# Patient Record
Sex: FEMALE | Race: WHITE | NOT HISPANIC OR LATINO | Employment: OTHER | URBAN - METROPOLITAN AREA
[De-identification: names, ages, dates, MRNs, and addresses within clinical notes are randomized per-mention and may not be internally consistent; named-entity substitution may affect disease eponyms.]

---

## 2017-01-17 ENCOUNTER — TELEPHONE (OUTPATIENT)
Dept: PEDIATRICS | Facility: CLINIC | Age: 67
End: 2017-01-17

## 2017-01-17 ENCOUNTER — OFFICE VISIT (OUTPATIENT)
Dept: PEDIATRICS | Facility: CLINIC | Age: 67
End: 2017-01-17
Payer: COMMERCIAL

## 2017-01-17 VITALS
DIASTOLIC BLOOD PRESSURE: 63 MMHG | HEART RATE: 78 BPM | WEIGHT: 119.4 LBS | OXYGEN SATURATION: 100 % | TEMPERATURE: 99.2 F | HEIGHT: 63 IN | BODY MASS INDEX: 21.16 KG/M2 | SYSTOLIC BLOOD PRESSURE: 101 MMHG

## 2017-01-17 DIAGNOSIS — Z23 NEED FOR PROPHYLACTIC VACCINATION AGAINST STREPTOCOCCUS PNEUMONIAE (PNEUMOCOCCUS): ICD-10-CM

## 2017-01-17 DIAGNOSIS — Z12.31 ENCOUNTER FOR SCREENING MAMMOGRAM FOR BREAST CANCER: ICD-10-CM

## 2017-01-17 DIAGNOSIS — F43.21 SITUATIONAL DEPRESSION: ICD-10-CM

## 2017-01-17 DIAGNOSIS — Z13.6 CARDIOVASCULAR SCREENING; LDL GOAL LESS THAN 160: ICD-10-CM

## 2017-01-17 DIAGNOSIS — Z13.1 SCREENING FOR DIABETES MELLITUS: ICD-10-CM

## 2017-01-17 DIAGNOSIS — I83.93 VARICOSE VEINS OF BOTH LOWER EXTREMITIES: ICD-10-CM

## 2017-01-17 DIAGNOSIS — F43.21 SITUATIONAL DEPRESSION: Primary | ICD-10-CM

## 2017-01-17 DIAGNOSIS — Z00.00 ENCOUNTER FOR ROUTINE ADULT HEALTH EXAMINATION WITHOUT ABNORMAL FINDINGS: Primary | ICD-10-CM

## 2017-01-17 PROCEDURE — G0439 PPPS, SUBSEQ VISIT: HCPCS | Performed by: NURSE PRACTITIONER

## 2017-01-17 PROCEDURE — 90670 PCV13 VACCINE IM: CPT | Performed by: NURSE PRACTITIONER

## 2017-01-17 PROCEDURE — G0009 ADMIN PNEUMOCOCCAL VACCINE: HCPCS | Performed by: NURSE PRACTITIONER

## 2017-01-17 RX ORDER — CITALOPRAM HYDROBROMIDE 10 MG/1
10 TABLET ORAL DAILY
Qty: 90 TABLET | Refills: 1 | Status: SHIPPED | OUTPATIENT
Start: 2017-01-17 | End: 2017-08-23

## 2017-01-17 RX ORDER — CITALOPRAM HYDROBROMIDE 10 MG/1
10 TABLET ORAL DAILY
Qty: 90 TABLET | Refills: 1 | Status: SHIPPED | OUTPATIENT
Start: 2017-01-17 | End: 2017-01-17

## 2017-01-17 NOTE — PROGRESS NOTES
SUBJECTIVE:                                                            Aleksandra Michel is a 66 year old female who presents for Preventive Visit    Are you in the first 12 months of your Medicare Part B coverage?  No    Healthy Habits:    Do you get at least three servings of calcium containing foods daily (dairy, green leafy vegetables, etc.)? yes    Amount of exercise or daily activities, outside of work: 3-4 day(s) per week    Problems taking medications regularly No    Medication side effects: No    Have you had an eye exam in the past two years? yes    Do you see a dentist twice per year? yes    Do you have sleep apnea, excessive snoring or daytime drowsiness?no    COGNITIVE SCREEN  1) Repeat 3 items (Banana, Sunrise, Chair)    2) Clock draw: NORMAL  3) 3 item recall: Recalls 3 objects  Results: 3 items recalled: COGNITIVE IMPAIRMENT LESS LIKELY    Mini-CogTM Copyright S Malcolm. Licensed by the author for use in [facility] BO.LT; reprinted with permission (sarahi@[facility]). All rights reserved.          All Histories reviewed and updated in EPIC as appropriate.  Social History   Substance Use Topics     Smoking status: Former Smoker -- 4 years     Types: Cigarettes     Smokeless tobacco: Never Used     Alcohol Use: Yes      Comment: occasional glass of wine     Today's PHQ-2 Score:   PHQ-2 ( 1999 Pf[employer]) 12/30/2016 6/21/2012   Q1: Little interest or pleasure in doing things 1 0   Q2: Feeling down, depressed or hopeless 1 0   PHQ-2 Score 2 0       Do you feel safe in your environment - Yes    Do you have a Health Care Directive?: Yes: Advance Directive has been received and scanned.    Current providers sharing in care for this patient include:   Patient Care Team:  Stephany Hannon APRN CNP as PCP - General (Family Practice)      Hearing impairment: No    Ability to successfully perform activities of daily living: Yes, no assistance needed     Fall risk:       Home safety:  none identified      The  following health maintenance items are reviewed in Epic and correct as of today:  Health Maintenance   Topic Date Due     HEPATITIS C SCREENING  03/28/1966     FALL RISK ASSESSMENT  03/28/2013     PNEUMOCOCCAL (1 of 2 - PCV13) 03/28/2013     MAMMO SCREEN Q2 YR (SYSTEM ASSIGNED)  05/15/2015     COLONOSCOPY Q3 YR INBASKET MESSAGE  07/18/2016     INFLUENZA VACCINE (SYSTEM ASSIGNED)  09/01/2016     LIPID SCREEN Q5 YR FEMALE (SYSTEM ASSIGNED)  03/27/2018     ADVANCE DIRECTIVE PLANNING Q5 YRS (NO INBASKET)  02/18/2019     TETANUS IMMUNIZATION (SYSTEM ASSIGNED)  03/27/2023     DEXA SCAN SCREENING (SYSTEM ASSIGNED)  Completed         Pneumonia Vaccine:Adults age 65+ who have not received previous Pneumovax (PPSV23) or PCV13 as an adult: Should first be given PCV13 AND then should be given PPSV23 6-12 months after PCV13  Mammogram Screening: Patient over age 50, mutual decision to screen reflected in health maintenance.     ROS:  CONSTITUTIONAL:NEGATIVE for fever, chills, change in weight  INTEGUMENTARY/SKIN: NEGATIVE for worrisome rashes, moles or lesions  EYES: NEGATIVE for vision changes or irritation  ENT: NEGATIVE for ear, mouth and throat problems  RESP:NEGATIVE for significant cough or SOB  BREAST: NEGATIVE for masses, tenderness or discharge  CV: NEGATIVE for chest pain, palpitations or peripheral edema  GI: NEGATIVE for nausea, abdominal pain, heartburn, or change in bowel habits   menopausal female: amenorrhea, no unusual urinary symptoms and no unusual vaginal symptoms  Had Roxanne Kae procedure and has no incontinence now   MUSCULOSKELETAL:NEGATIVE for significant arthralgias or myalgia  NEURO: NEGATIVE for weakness, dizziness or paresthesias  ENDOCRINE: NEGATIVE for temperature intolerance, skin/hair changes  HEME/ALLERGY/IMMUNE: NEGATIVE for bleeding problems  PSYCHIATRIC: NEGATIVE for changes in mood or affect and POSITIVE for situational depression        BP Readings from Last 3 Encounters:   01/17/17 101/63    12/30/16 107/73   03/27/13 98/62    Wt Readings from Last 3 Encounters:   01/17/17 119 lb 6.4 oz (54.159 kg)   12/30/16 122 lb 6.4 oz (55.52 kg)   03/27/13 139 lb (63.05 kg)                  Patient Active Problem List   Diagnosis     Colon polyp     Seasonal allergic rhinitis     Osteoporosis     CARDIOVASCULAR SCREENING; LDL GOAL LESS THAN 160     Advanced directives, counseling/discussion     Past Surgical History   Procedure Laterality Date     Hysterectomy, vaginal  1985     age 36, prolonged menses. ovaries remain       Social History   Substance Use Topics     Smoking status: Former Smoker -- 4 years     Types: Cigarettes     Smokeless tobacco: Never Used     Alcohol Use: Yes      Comment: occasional glass of wine     Family History   Problem Relation Age of Onset     Arthritis Brother      Eye Disorder Mother      macular degeneration         Current Outpatient Prescriptions   Medication Sig Dispense Refill     Multiple Vitamins-Minerals (MULTI COMPLETE PO)        UNABLE TO FIND daily MEDICATION NAME: Osteo supplement for bones. Takes liquid once daily       cetirizine (ZYRTEC) 10 MG tablet Take 10 mg by mouth daily as needed for allergies       vitamin B complex with vitamin C (VITAMIN  B COMPLEX) TABS tablet Take 1 tablet by mouth daily       UNKNOWN TO PATIENT Hormone cream from Gyno-uses twice per week       citalopram (CELEXA) 20 MG tablet Take 1/2 tablet (10 mg) for 1-2 weeks, then increase to 1 tablet orally daily 30 tablet 0     Calcium Carbonate-Vitamin D (CALCIUM + D PO) Take 1 tablet by mouth daily. Takes 3 tablets in morning and 2 tablets in evening       Multiple Vitamin (DAILY MULTIVITAMIN PO) Take 1 tablet by mouth daily.       Multiple Vitamins-Minerals (PRESERVISION/LUTEIN PO) Take 1 tablet by mouth daily.       Allergies   Allergen Reactions     Dust Mites      Mold      Seasonal Allergies      OBJECTIVE:                                                            /63 mmHg  Pulse 78  " Temp(Src) 99.2  F (37.3  C) (Temporal)  Ht 5' 3\" (1.6 m)  Wt 119 lb 6.4 oz (54.159 kg)  BMI 21.16 kg/m2  SpO2 100% Estimated body mass index is 21.16 kg/(m^2) as calculated from the following:    Height as of this encounter: 5' 3\" (1.6 m).    Weight as of this encounter: 119 lb 6.4 oz (54.159 kg).  EXAM:   GENERAL APPEARANCE: healthy, alert and no distress  EYES: Eyes grossly normal to inspection, PERRL and conjunctivae and sclerae normal  HENT: ear canals and TM's normal, nose and mouth without ulcers or lesions, oropharynx clear and oral mucous membranes moist  NECK: no adenopathy, no asymmetry, masses, or scars and thyroid normal to palpation  RESP: lungs clear to auscultation - no rales, rhonchi or wheezes  BREAST: normal without masses, tenderness or nipple discharge and no palpable axillary masses or adenopathy  CV: regular rate and rhythm, normal S1 S2, no S3 or S4, no murmur, click or rub, no peripheral edema and peripheral pulses strong  ABDOMEN: soft, nontender, no hepatosplenomegaly, no masses and bowel sounds normal   (female): normal female external genitalia, normal urethral meatus, vaginal mucosal atrophy noted, normal cervix, adnexae, and uterus without masses or abnormal discharge  MS: no musculoskeletal defects are noted and gait is age appropriate without ataxia  EXTREMITIES: Good pulses. Good range of motion.  No edema. Normal reflexes. Diffuse, blanching varicosities bilateraly.  SKIN: no suspicious lesions or rashes  NEURO: Normal strength and tone, sensory exam grossly normal, mentation intact and speech normal  PSYCH: mentation appears normal and affect normal/bright    ASSESSMENT / PLAN:                                                            Aleksandra was seen today for physical.    Diagnoses and all orders for this visit:    Encounter for routine adult health examination without abnormal findings  -     Lipid panel reflex to direct LDL; Future  -     Basic metabolic panel; " "Future    Situational depression  -     Discontinue: citalopram (CELEXA) 10 MG tablet; Take 1 tablet (10 mg) by mouth daily Take 1/2 tablet (10 mg) for 1-2 weeks, then increase to 1 tablet orally daily    Need for prophylactic vaccination against Streptococcus pneumoniae (pneumococcus)  -     PNEUMOCOCCAL CONJ VACCINE 13 VALENT IM (PREVNAR 13)  -     ADMIN 1st VACCINE    Encounter for screening mammogram for breast cancer  -     MA Screening Digital Bilateral; Future    Varicose veins of both lower extremities  -     VASCULAR SURGERY REFERRAL    CARDIOVASCULAR SCREENING; LDL GOAL LESS THAN 160  -     Lipid panel reflex to direct LDL; Future    Screening for diabetes mellitus  -     Basic metabolic panel; Future    PLAN:    Patient needs to follow up in if no improvement,or sooner if worsening of symptoms or other symptoms develop.  CONSULTATION/REFERRAL to Vascular surgeon   FUTURE LABS:       - Schedule a fasting blood draw   Follow up in 6 months.  Follow up office visit in one year for annual health maintenance exam, sooner PRN.      End of Life Planning:  Patient currently has an advanced directive: Yes.  Practitioner is supportive of decision.    COUNSELING:  Reviewed preventive health counseling, as reflected in patient instructions       Regular exercise       Healthy diet/nutrition       Vaccinated for: Pneumococcal       Osteoporosis Prevention/Bone Health       Colon cancer screening       The ASCVD Risk score ([place] HOLLIE Jr., et al., 2013) failed to calculate for the following reasons:    Cannot find a previous HDL lab    Cannot find a previous total cholesterol lab        Estimated body mass index is 21.16 kg/(m^2) as calculated from the following:    Height as of this encounter: 5' 3\" (1.6 m).    Weight as of this encounter: 119 lb 6.4 oz (54.159 kg).     reports that she has quit smoking. Her smoking use included Cigarettes. She quit after 4 years of use. She has never used smokeless " tobacco.      Appropriate preventive services were discussed with this patient, including applicable screening as appropriate for cardiovascular disease, diabetes, osteopenia/osteoporosis, and glaucoma.  As appropriate for age/gender, discussed screening for colorectal cancer, prostate cancer, breast cancer, and cervical cancer. Checklist reviewing preventive services available has been given to the patient.    Reviewed patients plan of care and provided an AVS. The Intermediate Care Plan ( asthma action plan, low back pain action plan, and migraine action plan) for Aleksandra meets the Care Plan requirement. This Care Plan has been established and reviewed with the Patient.    Counseling Resources:  ATP IV Guidelines  Pooled Cohorts Equation Calculator  Breast Cancer Risk Calculator  FRAX Risk Assessment  ICSI Preventive Guidelines  Dietary Guidelines for Americans, 2010  regrob.com's MyPlate  ASA Prophylaxis  Lung CA Screening    VAR[facility]        Screening Questionnaire for Adult Immunization    Are you sick today?   No   Do you have allergies to medications, food, a vaccine component or latex?   No   Have you ever had a serious reaction after receiving a vaccination?   No   Do you have a long-term health problem with heart disease, lung disease, asthma, kidney disease, metabolic disease (e.g. diabetes), anemia, or other blood disorder?   No   Do you have cancer, leukemia, HIV/AIDS, or any other immune system problem?   No   In the past 3 months, have you taken medications that affect  your immune system, such as prednisone, other steroids, or anticancer drugs; drugs for the treatment of rheumatoid arthritis, Crohn s disease, or psoriasis; or have you had radiation treatments?   No   Have you had a seizure, or a brain or other nervous system problem?   No   During the past year, have you received a transfusion of blood or blood     products, or been given immune (gamma)  globulin or antiviral drug?   No   For women: Are you pregnant or is there a chance you could become        pregnant during the next month?   No   Have you received any vaccinations in the past 4 weeks?   No     Immunization questionnaire answers were all negative.      MNVFC doesn't apply on this patient    Per orders of Dr. Stephany Hannon, injection of PCV 13 given by Maritza Tse. Patient instructed to remain in clinic for 20 minutes afterwards, and to report any adverse reaction to me immediately.       Screening performed by Maritza Tse on 1/17/2017 at 2:30 PM.

## 2017-01-17 NOTE — NURSING NOTE
"Chief Complaint   Patient presents with     Physical       Initial /63 mmHg  Pulse 78  Temp(Src) 99.2  F (37.3  C) (Temporal)  Ht 5' 3\" (1.6 m)  Wt 119 lb 6.4 oz (54.159 kg)  BMI 21.16 kg/m2  SpO2 100% Estimated body mass index is 21.16 kg/(m^2) as calculated from the following:    Height as of this encounter: 5' 3\" (1.6 m).    Weight as of this encounter: 119 lb 6.4 oz (54.159 kg).  BP completed using cuff size: osmany Ochoa CMA      "

## 2017-01-17 NOTE — TELEPHONE ENCOUNTER
[facility] pharmacy calling for clarification on Celexa dosing ordered today 1/17/17.    citalopram (CELEXA) 10 MG tablet 90 tablet 1 1/17/2017  --      Sig: Take 1 tablet (10 mg) by mouth daily Take 1/2 tablet (10 mg) for 1-2 weeks, then increase to 1        Will route to provider to clarify dose.  Pharmacy asking if provider wants 20 mg tablets ordered?      Provider, please review and advise.  Thank you!

## 2017-01-17 NOTE — MR AVS SNAPSHOT
After Visit Summary   1/17/2017    Aleksandra Michel    MRN: [id number]           Patient Information     Date Of Birth          1950        Visit Information        Provider Department      1/17/2017 1:40 PM Stephany Hannon APRN CNP [facility]        Today's Diagnoses     Encounter for routine adult health examination without abnormal findings    -  1     Situational depression         Need for prophylactic vaccination against Streptococcus pneumoniae (pneumococcus)         Encounter for screening mammogram for breast cancer         CARDIOVASCULAR SCREENING; LDL GOAL LESS THAN 160         Screening for diabetes mellitus         Varicose veins of both lower extremities           Care Instructions    It was a pleasure seeing you today at the [facility]. Thank you for allowing us to care for you today. We truly hope we provided you with the excellent service you deserve. Please let us know if there is anything else we can do for you so we can be sure you are leaving completley satisfied with your care experience.       General information about your clinic   Clinic Hours Lab Hours (Appointments are required)   Mon-Thurs: 7:30 AM - 7 PM Mon-Thurs: 7:30 AM - 7 PM   Fri: 7:30 AM - 5 PM Fri: 7:30 AM - 5 PM        After Hours Nurse Advise & Appts:  [facility]visors: [phone]  [place] On Call: to make appointments anytime: [phone] On Call Physician: call [phone] and answering service will page the on call physician.        For urgent appointments, please call [phone] and ask for the triage nurse or your care team clinic nurse.  How to contact my care team:  MyChart: [email or web address]   Phone: [phone]   Fax: [phone]       [facility]:   Phone: [phone]  Hours: 8:00 AM - 6:00 PM  Medication Refills:  Call your pharmacy and they will forward the refill to us. Please allow 3 business days for your  refills to be completed.       Normal or non-critical lab and imaging results will be communicated to you by MyChart, letter or phone within 7 days.  If you do not hear from us within 10 days, please call the clinic. If you have a critical or abnormal lab result, we will notify you by phone as soon as possible.       We now have PWIC (Pediatric Walk in Care)  Monday-Friday from 7:30-4. Simply walk in and be seen for your urgent needs like cough, fever, rash, diarrhea or vomiting, pink eye, UTI. No appointments needed. Ask one of the team for more information      -Your Care Team:    Dr. Ron Butterfield - Internal Medicine/Pediatrics   Dr. Casimiro Flood - Family Medicine  Dr. Barbra Pennington - Pediatrics  Stephany Hannon CNP - Family Practice Nurse Practitioner         PLAN:   1.   Symptomatic therapy suggested: Continue current medications.  2.  Orders Placed This Encounter   Medications     Multiple Vitamins-Minerals (MULTI COMPLETE PO)     Sig:      citalopram (CELEXA) 10 MG tablet     Sig: Take 1 tablet (10 mg) by mouth daily Take 1/2 tablet (10 mg) for 1-2 weeks, then increase to 1 tablet orally daily     Dispense:  90 tablet     Refill:  1     Orders Placed This Encounter   Procedures     MA Screening Digital Bilateral     PNEUMOCOCCAL CONJ VACCINE 13 VALENT IM (PREVNAR 13)     Lipid panel reflex to direct LDL     Basic metabolic panel     VASCULAR SURGERY REFERRAL       3. Patient needs to follow up in if no improvement,or sooner if worsening of symptoms or other symptoms develop.  CONSULTATION/REFERRAL to Vascular surgeon   FUTURE LABS:       - Schedule a fasting blood draw   Follow up in 6 months.  Follow up office visit in one year for annual health maintenance exam, sooner PRN.      Preventive Health Recommendations    Female Ages 65 +    Yearly exam:     See your health care provider every year in order to  o Review health changes.   o Discuss preventive care.    o Review your medicines if your doctor has  prescribed any.      You no longer need a yearly Pap test unless you've had an abnormal Pap test in the past 10 years. If you have vaginal symptoms, such as bleeding or discharge, be sure to talk with your provider about a Pap test.      Every 1 to 2 years, have a mammogram.  If you are over 69, talk with your health care provider about whether or not you want to continue having screening mammograms.      Every 10 years, have a colonoscopy. Or, have a yearly FIT test (stool test). These exams will check for colon cancer.       Have a cholesterol test every 5 years, or more often if your doctor advises it.       Have a diabetes test (fasting glucose) every three years. If you are at risk for diabetes, you should have this test more often.       At age 65, have a bone density scan (DEXA) to check for osteoporosis (brittle bone disease).    Shots:    Get a flu shot each year.    Get a tetanus shot every 10 years.    Talk to your doctor about your pneumonia vaccines. There are now two you should receive - Pneumovax (PPSV 23) and Prevnar (PCV 13).    Talk to your doctor about the shingles vaccine.    Talk to your doctor about the hepatitis B vaccine.    Nutrition:     Eat at least 5 servings of fruits and vegetables each day.      Eat whole-grain bread, whole-wheat pasta and brown rice instead of white grains and rice.      Talk to your provider about Calcium and Vitamin D.     Lifestyle    Exercise at least 150 minutes a week (30 minutes a day, 5 days a week). This will help you control your weight and prevent disease.      Limit alcohol to one drink per day.      No smoking.       Wear sunscreen to prevent skin cancer.       See your dentist twice a year for an exam and cleaning.      See your eye doctor every 1 to 2 years to screen for conditions such as glaucoma, macular degeneration and cataracts.        Follow-ups after your visit        Additional Services     VASCULAR SURGERY REFERRAL       Your provider has  referred you to: [facility] [place]: [facility] [place] - [phone] or [facility] Services  - Varicose Veins & None - Please Order Appropriate Testing   [email or web address]    Please be aware that coverage of these services is subject to the terms and limitations of your health insurance plan.  Call member services at your health plan with any benefit or coverage questions.      Please bring the following with you to your appointment:    (1) Any X-Rays, CTs or MRIs which have been performed.  Contact the facility where they were done to arrange for  prior to your scheduled appointment.    (2) List of current medications   (3) This referral request   (4) Any documents/labs given to you for this referral                  Future tests that were ordered for you today     Open Future Orders        Priority Expected Expires Ordered    Lipid panel reflex to direct LDL Routine  12/17/2017 1/17/2017    Basic metabolic panel Routine  12/17/2017 1/17/2017    MA Screening Digital Bilateral Routine  1/17/2018 1/17/2017            Who to contact     If you have questions or need follow up information about today's clinic visit or your schedule please contact [facility] directly at [phone].  Normal or non-critical lab and imaging results will be communicated to you by MyChart, letter or phone within 4 business days after the clinic has received the results. If you do not hear from us within 7 days, please contact the clinic through [employer] or phone. If you have a critical or abnormal lab result, we will notify you by phone as soon as possible.  Submit refill requests through [employer] or call your pharmacy and they will forward the refill request to us. Please allow 3 business days for your refill to be completed.          Additional Information About Your Visit        [employer] Information     [employer] is an electronic gateway that provides easy, online access to your  "medical records. With [employer], you can request a clinic appointment, read your test results, renew a prescription or communicate with your care team.     To sign up for [employer] visit the website at [email or web address]   You will be asked to enter the access code listed below, as well as some personal information. Please follow the directions to create your username and password.     Your access code is: [email or web address]  Expires: 3/30/2017 11:46 AM     Your access code will  in 90 days. If you need help or a new code, please contact your [facility] or call [phone] for assistance.        Care EveryWhere ID     This is your Care EveryWhere ID. This could be used by other organizations to access your [place] medical records  [id number]        Your Vitals Were     Pulse Temperature Height BMI (Body Mass Index) Pulse Oximetry       78 99.2  F (37.3  C) (Temporal) 5' 3\" (1.6 m) 21.16 kg/m2 100%        Blood Pressure from Last 3 Encounters:   17 101/63   16 107/73   13 98/62    Weight from Last 3 Encounters:   17 119 lb 6.4 oz (54.159 kg)   16 122 lb 6.4 oz (55.52 kg)   13 139 lb (63.05 kg)              We Performed the Following     PNEUMOCOCCAL CONJ VACCINE 13 VALENT IM (PREVNAR 13)     VASCULAR SURGERY REFERRAL          Today's Medication Changes          These changes are accurate as of: 17  2:27 PM.  If you have any questions, ask your nurse or doctor.               These medicines have changed or have updated prescriptions.        Dose/Directions    * citalopram 20 MG tablet   Commonly known as:  celeXA   This may have changed:  Another medication with the same name was added. Make sure you understand how and when to take each.   Used for:  Situational depression   Changed by:  Stephany Hannon APRN CNP        Take 1/2 tablet (10 mg) for 1-2 weeks, then increase to 1 tablet orally daily   Quantity:  30 tablet   Refills:  0 "       * citalopram 10 MG tablet   Commonly known as:  celeXA   This may have changed:  You were already taking a medication with the same name, and this prescription was added. Make sure you understand how and when to take each.   Used for:  Situational depression   Changed by:  Stephany Hannon APRN CNP        Dose:  10 mg   Take 1 tablet (10 mg) by mouth daily Take 1/2 tablet (10 mg) for 1-2 weeks, then increase to 1 tablet orally daily   Quantity:  90 tablet   Refills:  1       * Notice:  This list has 2 medication(s) that are the same as other medications prescribed for you. Read the directions carefully, and ask your doctor or other care provider to review them with you.         Where to get your medicines      These medications were sent to [facility]/pharmacy #[id number] - [facility] [address] [place] [address]  [address] [place] [address]     Phone:  [phone]    - citalopram 10 MG tablet             Primary Care Provider Office Phone # Fax #    VARSHA Perkins [phone]       [facility] [address]  [place] [address]        Thank you!     Thank you for choosing [facility]  for your care. Our goal is always to provide you with excellent care. Hearing back from our patients is one way we can continue to improve our services. Please take a few minutes to complete the written survey that you may receive in the mail after your visit with us. Thank you!             Your Updated Medication List - Protect others around you: Learn how to safely use, store and throw away your medicines at www.disposemymeds.org.          This list is accurate as of: 1/17/17  2:27 PM.  Always use your most recent med list.                   Brand Name Dispense Instructions for use    CALCIUM + D PO      Take 1 tablet by mouth daily. Takes 3 tablets in morning and 2 tablets in evening       cetirizine 10 MG tablet    zyrTEC     Take 10 mg by mouth daily  as needed for allergies       * citalopram 20 MG tablet    celeXA    30 tablet    Take 1/2 tablet (10 mg) for 1-2 weeks, then increase to 1 tablet orally daily       * citalopram 10 MG tablet    celeXA    90 tablet    Take 1 tablet (10 mg) by mouth daily Take 1/2 tablet (10 mg) for 1-2 weeks, then increase to 1 tablet orally daily       * MULTI COMPLETE PO          DAILY MULTIVITAMIN PO      Take 1 tablet by mouth daily.       * PRESERVISION/LUTEIN PO      Take 1 tablet by mouth daily.       UNABLE TO FIND      daily MEDICATION NAME: Osteo supplement for bones. Takes liquid once daily       UNKNOWN TO PATIENT      Hormone cream from Gyno-uses twice per week       vitamin B complex with vitamin C Tabs tablet      Take 1 tablet by mouth daily       * Notice:  This list has 4 medication(s) that are the same as other medications prescribed for you. Read the directions carefully, and ask your doctor or other care provider to review them with you.

## 2017-01-17 NOTE — PATIENT INSTRUCTIONS
It was a pleasure seeing you today at the [facility]. Thank you for allowing us to care for you today. We truly hope we provided you with the excellent service you deserve. Please let us know if there is anything else we can do for you so we can be sure you are leaving completley satisfied with your care experience.       General information about your clinic   Clinic Hours Lab Hours (Appointments are required)   Mon-Thurs: 7:30 AM - 7 PM Mon-Thurs: 7:30 AM - 7 PM   Fri: 7:30 AM - 5 PM Fri: 7:30 AM - 5 PM        After Hours Nurse Advise & Appts:  [facility] Advisors: [phone]  [place] On Call: to make appointments anytime: [phone] On Call Physician: call [phone] and answering service will page the on call physician.        For urgent appointments, please call [phone] and ask for the triage nurse or your care team clinic nurse.  How to contact my care team:  [facility]: [email or web address]   Phone: [phone]   Fax: [phone]       [facility]:   Phone: [phone]  Hours: 8:00 AM - 6:00 PM  Medication Refills:  Call your pharmacy and they will forward the refill to us. Please allow 3 business days for your refills to be completed.       Normal or non-critical lab and imaging results will be communicated to you by MyChart, letter or phone within 7 days.  If you do not hear from us within 10 days, please call the clinic. If you have a critical or abnormal lab result, we will notify you by phone as soon as possible.       We now have PWIC (Pediatric Walk in Care)  Monday-Friday from 7:30-4. Simply walk in and be seen for your urgent needs like cough, fever, rash, diarrhea or vomiting, pink eye, UTI. No appointments needed. Ask one of the team for more information      -Your Care Team:    Dr. Ron Butterfield - Internal Medicine/Pediatrics   Dr. Casimiro Flood - Family Medicine  Dr. Barbra Pennington - Pediatrics  Stephany Hannon CNP - Family Practice Nurse  Practitioner         PLAN:   1.   Symptomatic therapy suggested: Continue current medications.  2.  Orders Placed This Encounter   Medications     Multiple Vitamins-Minerals (MULTI COMPLETE PO)     Sig:      citalopram (CELEXA) 10 MG tablet     Sig: Take 1 tablet (10 mg) by mouth daily Take 1/2 tablet (10 mg) for 1-2 weeks, then increase to 1 tablet orally daily     Dispense:  90 tablet     Refill:  1     Orders Placed This Encounter   Procedures     MA Screening Digital Bilateral     PNEUMOCOCCAL CONJ VACCINE 13 VALENT IM (PREVNAR 13)     Lipid panel reflex to direct LDL     Basic metabolic panel     VASCULAR SURGERY REFERRAL       3. Patient needs to follow up in if no improvement,or sooner if worsening of symptoms or other symptoms develop.  CONSULTATION/REFERRAL to Vascular surgeon   FUTURE LABS:       - Schedule a fasting blood draw   Follow up in 6 months.  Follow up office visit in one year for annual health maintenance exam, sooner PRN.      Preventive Health Recommendations    Female Ages 65 +    Yearly exam:     See your health care provider every year in order to  o Review health changes.   o Discuss preventive care.    o Review your medicines if your doctor has prescribed any.      You no longer need a yearly Pap test unless you've had an abnormal Pap test in the past 10 years. If you have vaginal symptoms, such as bleeding or discharge, be sure to talk with your provider about a Pap test.      Every 1 to 2 years, have a mammogram.  If you are over 69, talk with your health care provider about whether or not you want to continue having screening mammograms.      Every 10 years, have a colonoscopy. Or, have a yearly FIT test (stool test). These exams will check for colon cancer.       Have a cholesterol test every 5 years, or more often if your doctor advises it.       Have a diabetes test (fasting glucose) every three years. If you are at risk for diabetes, you should have this test more often.       At  age 65, have a bone density scan (DEXA) to check for osteoporosis (brittle bone disease).    Shots:    Get a flu shot each year.    Get a tetanus shot every 10 years.    Talk to your doctor about your pneumonia vaccines. There are now two you should receive - Pneumovax (PPSV 23) and Prevnar (PCV 13).    Talk to your doctor about the shingles vaccine.    Talk to your doctor about the hepatitis B vaccine.    Nutrition:     Eat at least 5 servings of fruits and vegetables each day.      Eat whole-grain bread, whole-wheat pasta and brown rice instead of white grains and rice.      Talk to your provider about Calcium and Vitamin D.     Lifestyle    Exercise at least 150 minutes a week (30 minutes a day, 5 days a week). This will help you control your weight and prevent disease.      Limit alcohol to one drink per day.      No smoking.       Wear sunscreen to prevent skin cancer.       See your dentist twice a year for an exam and cleaning.      See your eye doctor every 1 to 2 years to screen for conditions such as glaucoma, macular degeneration and cataracts.

## 2017-02-17 ENCOUNTER — RADIANT APPOINTMENT (OUTPATIENT)
Dept: MAMMOGRAPHY | Facility: CLINIC | Age: 67
End: 2017-02-17
Attending: NURSE PRACTITIONER
Payer: COMMERCIAL

## 2017-02-17 DIAGNOSIS — Z12.31 ENCOUNTER FOR SCREENING MAMMOGRAM FOR BREAST CANCER: ICD-10-CM

## 2017-02-17 PROCEDURE — G0202 SCR MAMMO BI INCL CAD: HCPCS | Performed by: RADIOLOGY

## 2017-05-02 ENCOUNTER — OFFICE VISIT (OUTPATIENT)
Dept: OPHTHALMOLOGY | Facility: CLINIC | Age: 67
End: 2017-05-02
Payer: COMMERCIAL

## 2017-05-02 DIAGNOSIS — H25.13 SENILE NUCLEAR SCLEROSIS, BILATERAL: Primary | ICD-10-CM

## 2017-05-02 PROCEDURE — 92004 COMPRE OPH EXAM NEW PT 1/>: CPT | Performed by: OPHTHALMOLOGY

## 2017-05-02 ASSESSMENT — VISUAL ACUITY
OD_CC: 20/20
OD_SC: 20/20
OD_CC: J3
OS_SC: 20/40
OS_SC: J2
OS_CC: 20/25
METHOD: SNELLEN - LINEAR
OS_SC+: -1
OS_CC: J2
OD_SC: J16

## 2017-05-02 ASSESSMENT — SLIT LAMP EXAM - LIDS
COMMENTS: NORMAL
COMMENTS: NORMAL

## 2017-05-02 ASSESSMENT — REFRACTION_WEARINGRX
SPECS_TYPE: SVL DISTANCE
OD_SPHERE: -0.25
SPECS_TYPE: OTC READERS
OD_CYLINDER: +1.00
OS_CYLINDER: +1.00
OS_SPHERE: -2.00
OS_SPHERE: +1.25
OD_AXIS: 180
OD_CYLINDER: SPHERE
OS_CYLINDER: SPHERE
OS_AXIS: 179
OD_SPHERE: +1.25

## 2017-05-02 ASSESSMENT — CONF VISUAL FIELD
OD_NORMAL: 1
OS_NORMAL: 1

## 2017-05-02 ASSESSMENT — REFRACTION_MANIFEST
OS_ADD: +2.75
OS_SPHERE: -2.00
OD_ADD: +2.75
OD_AXIS: 175
OD_CYLINDER: +1.00
OD_SPHERE: PLANO
OS_CYLINDER: +0.75
OS_AXIS: 010

## 2017-05-02 ASSESSMENT — TONOMETRY
OS_IOP_MMHG: 13
IOP_METHOD: TONOPEN
OD_IOP_MMHG: 11

## 2017-05-02 ASSESSMENT — EXTERNAL EXAM - LEFT EYE: OS_EXAM: NORMAL

## 2017-05-02 ASSESSMENT — EXTERNAL EXAM - RIGHT EYE: OD_EXAM: NORMAL

## 2017-05-02 ASSESSMENT — CUP TO DISC RATIO
OD_RATIO: 0.3
OS_RATIO: 0.3

## 2017-05-02 NOTE — PROGRESS NOTES
Assessment & Plan   Aleksandra Michel is a 67 year old female who presents with:   Review of systems for the eyes was negative other than the pertinent positives and negatives noted in the HPI.    Senile nuclear sclerosis, bilateral  - Good vision OU. Observe for now. Refraction matches current glasses for night driving, etc.  - h/o refractive surgery right eye for distance.  - Natural MV OS.      Return in 1 year for annual exam.    Documentation for today's encounter was performed by Gracy ARMSTRONG.  Acting as a scribe in my presence. I have reviewed and verified that it is an accurate recording of today's encounter.    Attending Physician Attestation:  Complete documentation of historical and exam elements from today's encounter can be found in the full encounter summary report (not reduplicated in this progress note).  I personally obtained the chief complaint(s) and history of present illness.  I confirmed and edited as necessary the review of systems, past medical/surgical history, family history, social history, and examination findings as documented by others; and I examined the patient myself.  I personally reviewed the relevant tests, images, and reports as documented above.  I formulated and edited as necessary the assessment and plan and discussed the findings and management plan with the patient and family. - Trevor Mena MD

## 2017-05-02 NOTE — NURSING NOTE
Chief Complaint   Patient presents with     Cataract Evaluation     Last couple years has been told she has the start of cataracts in both eyes by an OD she sees. Has been noticing more cloudiness in OD>OS when reading, patient is frustrated because she used to be able to read without readers and now has to use them more. Had Lasik in OD about 8 years ago to correct distance only.

## 2017-05-02 NOTE — MR AVS SNAPSHOT
After Visit Summary   2017    Aleksandra Michel    MRN: [id number]           Patient Information     Date Of Birth          1950        Visit Information        Provider Department      2017 9:00 AM Trevor Mena MD; [facility] NURSE ONLY [facility]        Today's Diagnoses     Senile nuclear sclerosis, bilateral    -  1       Follow-ups after your visit        Who to contact     If you have questions or need follow up information about today's clinic visit or your schedule please contact [facility] directly at [phone].  Normal or non-critical lab and imaging results will be communicated to you by MyChart, letter or phone within 4 business days after the clinic has received the results. If you do not hear from us within 7 days, please contact the clinic through [employer] or phone. If you have a critical or abnormal lab result, we will notify you by phone as soon as possible.  Submit refill requests through [email or web address] or call your pharmacy and they will forward the refill request to us. Please allow 3 business days for your refill to be completed.          Additional Information About Your Visit        MyChart Information     [email or web address] is an electronic gateway that provides easy, online access to your medical records. With [email or web address], you can request a clinic appointment, read your test results, renew a prescription or communicate with your care team.     To sign up for [email or web address] visit the website at [email or web address]   You will be asked to enter the access code listed below, as well as some personal information. Please follow the directions to create your username and password.     Your access code is: [email or web address]  Expires: 2017  9:51 AM     Your access code will  in 90 days. If you need help or a new code, please contact your [facility] or call [phone] for assistance.        Care EveryWhere ID      This is your Care EveryWhere ID. This could be used by other organizations to access your [place] medical records  [id number]         Blood Pressure from Last 3 Encounters:   01/17/17 101/63   12/30/16 107/73   03/27/13 98/62    Weight from Last 3 Encounters:   01/17/17 54.2 kg (119 lb 6.4 oz)   12/30/16 55.5 kg (122 lb 6.4 oz)   03/27/13 63 kg (139 lb)              We Performed the Following     EYE EXAM, NEW PATIENT,COMPREHESV        Primary Care Provider Office Phone # Fax #    Stephany Hannon, VARSHA [phone]       [facility] [address]  [place] [address]        Thank you!     Thank you for choosing [facility]  for your care. Our goal is always to provide you with excellent care. Hearing back from our patients is one way we can continue to improve our services. Please take a few minutes to complete the written survey that you may receive in the mail after your visit with us. Thank you!             Your Updated Medication List - Protect others around you: Learn how to safely use, store and throw away your medicines at www.disposemymeds.org.          This list is accurate as of: 5/2/17  9:51 AM.  Always use your most recent med list.                   Brand Name Dispense Instructions for use    CALCIUM + D PO      Take 1 tablet by mouth daily. Takes 3 tablets in morning and 2 tablets in evening       cetirizine 10 MG tablet    zyrTEC     Take 10 mg by mouth daily as needed for allergies       * citalopram 20 MG tablet    celeXA    30 tablet    Take 1/2 tablet (10 mg) for 1-2 weeks, then increase to 1 tablet orally daily       * citalopram 10 MG tablet    celeXA    90 tablet    Take 1 tablet (10 mg) by mouth daily       * MULTI COMPLETE PO          DAILY MULTIVITAMIN PO      Take 1 tablet by mouth daily.       * PRESERVISION/LUTEIN PO      Take 1 tablet by mouth daily.       UNABLE TO FIND      daily MEDICATION NAME: Osteo supplement for  bones. Takes liquid once daily       UNKNOWN TO PATIENT      Hormone cream from Gyno-uses twice per week       vitamin B complex with vitamin C Tabs tablet      Take 1 tablet by mouth daily       * Notice:  This list has 4 medication(s) that are the same as other medications prescribed for you. Read the directions carefully, and ask your doctor or other care provider to review them with you.

## 2017-08-23 DIAGNOSIS — F43.21 SITUATIONAL DEPRESSION: ICD-10-CM

## 2017-08-23 NOTE — LETTER
August 24, 2017      Aleksandra Michel  [address] [facility] [address] [facility] [address]          Dear Aleksandra,    Thank you for your refill request. We recently received a call from your pharmacy requesting a refill of your medication. We have provided a 30 day refill of your medication, citalopram (CELEXA) 10 MG tablet, as requested.      However, our records show it is time for an follow up with Stephany Hannon CNP to ensure your treatment is safe and effective for you.      Regular follow up, including visits with your health care provider and laboratory monitoring are necessary to make sure your medication is working properly.    You can reach us at [phone] or schedule online via [employer].    Thank you for taking an active role in your healthcare.    Sincerely,  Poornima Rob RN,   [facility]  VARSHA Perkins CNP

## 2017-08-24 RX ORDER — CITALOPRAM HYDROBROMIDE 10 MG/1
TABLET ORAL
Qty: 30 TABLET | Refills: 0 | Status: SHIPPED | OUTPATIENT
Start: 2017-08-24 | End: 2017-09-18

## 2017-08-24 NOTE — TELEPHONE ENCOUNTER
citalopram (CELEXA) 10 MG tablet  Last Written Prescription Date: 1/17/2017  Last Fill Quantity: 90, # refills: 1  Last Office Visit with [facility] primary care provider:  1/17/2017        Last PHQ-9 score on record= Situational depression [F43.21]  - Primary   PHQ-9 SCORE 12/30/2016   Total Score 6       30 day aishwarya refill and letter sent to patient. Poornima Rob RN

## 2017-09-18 ENCOUNTER — OFFICE VISIT (OUTPATIENT)
Dept: PEDIATRICS | Facility: CLINIC | Age: 67
End: 2017-09-18
Payer: COMMERCIAL

## 2017-09-18 VITALS
TEMPERATURE: 97.7 F | HEART RATE: 78 BPM | BODY MASS INDEX: 22.83 KG/M2 | DIASTOLIC BLOOD PRESSURE: 60 MMHG | OXYGEN SATURATION: 96 % | SYSTOLIC BLOOD PRESSURE: 100 MMHG | WEIGHT: 128.9 LBS

## 2017-09-18 DIAGNOSIS — Z13.6 CARDIOVASCULAR SCREENING; LDL GOAL LESS THAN 160: ICD-10-CM

## 2017-09-18 DIAGNOSIS — Z23 NEED FOR PROPHYLACTIC VACCINATION AND INOCULATION AGAINST INFLUENZA: ICD-10-CM

## 2017-09-18 DIAGNOSIS — N81.10 CYSTOCELE, UNSPECIFIED: ICD-10-CM

## 2017-09-18 DIAGNOSIS — Z00.00 ENCOUNTER FOR ROUTINE ADULT HEALTH EXAMINATION WITHOUT ABNORMAL FINDINGS: ICD-10-CM

## 2017-09-18 DIAGNOSIS — N89.8 VAGINAL DISCHARGE: Primary | ICD-10-CM

## 2017-09-18 DIAGNOSIS — N76.0 BACTERIAL VAGINOSIS: ICD-10-CM

## 2017-09-18 DIAGNOSIS — B96.89 BACTERIAL VAGINOSIS: ICD-10-CM

## 2017-09-18 DIAGNOSIS — F43.21 SITUATIONAL DEPRESSION: ICD-10-CM

## 2017-09-18 DIAGNOSIS — Z13.1 SCREENING FOR DIABETES MELLITUS: ICD-10-CM

## 2017-09-18 DIAGNOSIS — H61.21 IMPACTED CERUMEN OF RIGHT EAR: ICD-10-CM

## 2017-09-18 LAB
ALBUMIN UR-MCNC: 10 MG/DL
ANION GAP SERPL CALCULATED.3IONS-SCNC: 6 MMOL/L (ref 3–14)
APPEARANCE UR: CLEAR
BACTERIA #/AREA URNS HPF: ABNORMAL /HPF
BILIRUB UR QL STRIP: NEGATIVE
BUN SERPL-MCNC: 15 MG/DL (ref 7–30)
CALCIUM SERPL-MCNC: 8.6 MG/DL (ref 8.5–10.1)
CHLORIDE SERPL-SCNC: 109 MMOL/L (ref 94–109)
CHOLEST SERPL-MCNC: 217 MG/DL
CO2 SERPL-SCNC: 25 MMOL/L (ref 20–32)
COLOR UR AUTO: YELLOW
CREAT SERPL-MCNC: 0.72 MG/DL (ref 0.52–1.04)
GFR SERPL CREATININE-BSD FRML MDRD: 81 ML/MIN/1.7M2
GLUCOSE SERPL-MCNC: 85 MG/DL (ref 70–99)
GLUCOSE UR STRIP-MCNC: NEGATIVE MG/DL
HDLC SERPL-MCNC: 101 MG/DL
HGB UR QL STRIP: NEGATIVE
KETONES UR STRIP-MCNC: NEGATIVE MG/DL
LDLC SERPL CALC-MCNC: 101 MG/DL
LEUKOCYTE ESTERASE UR QL STRIP: ABNORMAL
MUCOUS THREADS #/AREA URNS LPF: PRESENT /LPF
NITRATE UR QL: NEGATIVE
NON-SQ EPI CELLS #/AREA URNS LPF: ABNORMAL /LPF
NONHDLC SERPL-MCNC: 116 MG/DL
PH UR STRIP: 5 PH (ref 5–7)
POTASSIUM SERPL-SCNC: 4 MMOL/L (ref 3.4–5.3)
RBC #/AREA URNS AUTO: ABNORMAL /HPF
SODIUM SERPL-SCNC: 140 MMOL/L (ref 133–144)
SOURCE: ABNORMAL
SP GR UR STRIP: 1.02 (ref 1–1.03)
SPECIMEN SOURCE: ABNORMAL
TRIGL SERPL-MCNC: 77 MG/DL
UROBILINOGEN UR STRIP-MCNC: NORMAL MG/DL (ref 0–2)
WBC #/AREA URNS AUTO: ABNORMAL /HPF
WET PREP SPEC: ABNORMAL

## 2017-09-18 PROCEDURE — G0008 ADMIN INFLUENZA VIRUS VAC: HCPCS | Performed by: NURSE PRACTITIONER

## 2017-09-18 PROCEDURE — 36415 COLL VENOUS BLD VENIPUNCTURE: CPT | Performed by: NURSE PRACTITIONER

## 2017-09-18 PROCEDURE — 87210 SMEAR WET MOUNT SALINE/INK: CPT | Performed by: NURSE PRACTITIONER

## 2017-09-18 PROCEDURE — 87491 CHLMYD TRACH DNA AMP PROBE: CPT | Performed by: NURSE PRACTITIONER

## 2017-09-18 PROCEDURE — 87591 N.GONORRHOEAE DNA AMP PROB: CPT | Performed by: NURSE PRACTITIONER

## 2017-09-18 PROCEDURE — 90662 IIV NO PRSV INCREASED AG IM: CPT | Performed by: NURSE PRACTITIONER

## 2017-09-18 PROCEDURE — 80061 LIPID PANEL: CPT | Performed by: NURSE PRACTITIONER

## 2017-09-18 PROCEDURE — 99214 OFFICE O/P EST MOD 30 MIN: CPT | Mod: 25 | Performed by: NURSE PRACTITIONER

## 2017-09-18 PROCEDURE — 80048 BASIC METABOLIC PNL TOTAL CA: CPT | Performed by: NURSE PRACTITIONER

## 2017-09-18 PROCEDURE — 69210 REMOVE IMPACTED EAR WAX UNI: CPT | Performed by: NURSE PRACTITIONER

## 2017-09-18 PROCEDURE — 81001 URINALYSIS AUTO W/SCOPE: CPT | Performed by: NURSE PRACTITIONER

## 2017-09-18 RX ORDER — METRONIDAZOLE 500 MG/1
500 TABLET ORAL 2 TIMES DAILY
Qty: 14 TABLET | Refills: 0 | Status: SHIPPED | OUTPATIENT
Start: 2017-09-18 | End: 2017-09-25

## 2017-09-18 RX ORDER — CITALOPRAM HYDROBROMIDE 10 MG/1
TABLET ORAL
Qty: 90 TABLET | Refills: 3 | Status: SHIPPED | OUTPATIENT
Start: 2017-09-18 | End: 2018-11-09

## 2017-09-18 NOTE — NURSING NOTE
"Chief Complaint   Patient presents with     Depression     refill on medication     Ear Problem     right ear pain with headaches x 3-4 weeks     Vaginal Problem     vaginal discharge x 2 months, had monlisa done at OB       Initial /60 (BP Location: Right arm, Patient Position: Sitting, Cuff Size: Adult Regular)  Pulse 78  Temp 97.7  F (36.5  C) (Temporal)  Wt 128 lb 14.4 oz (58.5 kg)  SpO2 96%  Breastfeeding? No  BMI 22.83 kg/m2 Estimated body mass index is 22.83 kg/(m^2) as calculated from the following:    Height as of 1/17/17: 5' 3\" (1.6 m).    Weight as of this encounter: 128 lb 14.4 oz (58.5 kg).  Medication Reconciliation: complete      MEENU Jordan      "

## 2017-09-18 NOTE — PROGRESS NOTES
SUBJECTIVE:   Aleksandra Michel is a 67 year old female who presents to clinic today for the following health issues:    1. right ear pain with headaches x 3-4 weeks  On and off will come and go     Depression and Anxiety Follow-Up    Status since last visit: No change    Other associated symptoms:None    Complicating factors:     Significant life event: Yes-  Patient notes a lot of problems going on at home     Current substance abuse: None    PHQ-9 SCORE 12/30/2016   Total Score 6     TAYLOR-7 SCORE 12/30/2016   Total Score 6   had been on and off the  8/24  Had a leak in the Condo and had big construction   Was going to a therapist in the past     PHQ-9  English  PHQ-9   Any Language  GAD7      Amount of exercise or physical activity: walking everyday at work.    Problems taking medications regularly: No    Medication side effects: none  Diet: regular (no restrictions)    Vaginal Symptoms  Onset: 2 months    Description:  Vaginal Discharge: clear   Itching (Pruritis): no   Burning sensation:  no   Odor: YES    Accompanying Signs & Symptoms:  Pain with Urination: no   Abdominal Pain: no   Fever: no     History:   Sexually active: YES  New Partner: no   Possibility of Pregnancy:  No    Precipitating factors:   Recent Antibiotic Use: no     Alleviating factors:  none    Therapies Tried and outcome: none  Smells foul. Since her spouse came back   Does not have burning with urination.   Feels like things have dropped     Problem list and histories reviewed & adjusted, as indicated.  Additional history: as documented    Patient Active Problem List   Diagnosis     Colon polyp     Seasonal allergic rhinitis     Osteoporosis     CARDIOVASCULAR SCREENING; LDL GOAL LESS THAN 160     Advanced directives, counseling/discussion     Varicose veins of both lower extremities     Past Surgical History:   Procedure Laterality Date     ENHANCE LASER REFRACTIVE BILATERAL EXISTING PT IN PARAMETERS Right ~2008    Distance eye      HYSTERECTOMY, VAGINAL  1985    age 36, prolonged menses. ovaries remain       Social History   Substance Use Topics     Smoking status: Former Smoker     Years: 4.00     Types: Cigarettes     Smokeless tobacco: Never Used     Alcohol use Yes      Comment: occasional glass of wine     Family History   Problem Relation Age of Onset     Eye Disorder Mother      macular degeneration     Macular Degeneration Mother      Arthritis Brother          Current Outpatient Prescriptions   Medication Sig Dispense Refill     citalopram (CELEXA) 10 MG tablet TAKE 1 TABLET (10 MG) BY MOUTH DAILY 30 tablet 0     UNABLE TO FIND daily MEDICATION NAME: Osteo supplement for bones. Takes liquid once daily       cetirizine (ZYRTEC) 10 MG tablet Take 10 mg by mouth daily as needed for allergies       vitamin B complex with vitamin C (VITAMIN  B COMPLEX) TABS tablet Take 1 tablet by mouth daily       UNKNOWN TO PATIENT Hormone cream from Gyno-uses twice per week       Calcium Carbonate-Vitamin D (CALCIUM + D PO) Take 1 tablet by mouth daily. Takes 3 tablets in morning and 2 tablets in evening       Multiple Vitamin (DAILY MULTIVITAMIN PO) Take 1 tablet by mouth daily.       Multiple Vitamins-Minerals (PRESERVISION/LUTEIN PO) Take 1 tablet by mouth daily.       Multiple Vitamins-Minerals (MULTI COMPLETE PO)        [DISCONTINUED] citalopram (CELEXA) 20 MG tablet Take 1/2 tablet (10 mg) for 1-2 weeks, then increase to 1 tablet orally daily 30 tablet 0     Allergies   Allergen Reactions     Dust Mites      Mold      Seasonal Allergies      Labs reviewed in EPIC      Reviewed and updated as needed this visit by clinical staffTobacco  Allergies  Meds  Med Hx  Surg Hx  Fam Hx  Soc Hx      Reviewed and updated as needed this visit by Provider         ROS:  CONSTITUTIONAL:NEGATIVE for fever, chills, change in weight  INTEGUMENTARY/SKIN: NEGATIVE for rash   ENT/MOUTH: NEGATIVE for ear, mouth and throat problems  RESP:NEGATIVE for significant  cough or SOB  CV: NEGATIVE for chest pain/chest pressure  : vaginal discharge and  may have been with someone else   MUSCULOSKELETAL: NEGATIVE for joint pain , joint swelling  and joint warmth   PSYCHIATRIC: POSITIVE forHx anxiety, Hx depression and stress and NEGATIVE forthoughts of hurting someone else and thoughts of self harm    OBJECTIVE:     /60 (BP Location: Right arm, Patient Position: Sitting, Cuff Size: Adult Regular)  Pulse 78  Temp 97.7  F (36.5  C) (Temporal)  Wt 128 lb 14.4 oz (58.5 kg)  SpO2 96%  Breastfeeding? No  BMI 22.83 kg/m2  Body mass index is 22.83 kg/(m^2).  GENERAL APPEARANCE: alert, active and no distress  right ear(s) are occluded with cerumen.  The canal is  irrigated with good results, and thereafter are clear, with the exception of slight erythema and edema.  The tympanic membrane(s) is  benign in appeareance.    Procedure: The ears were cleaned using an otoscope, curette and irrigation to clear the cerumen.  The patient  was instructed to follow up as needed or sooner  if symptoms worsen or fail to improve as anticipated.    Instructions for home care to prevent wax buildup are given.      RESP: lungs clear to auscultation - no rales, rhonchi or wheezes  CV: regular rates and rhythm and no murmur, click or rub  ABDOMEN: soft, non-tender   (female): negative findings:  external genitalia normal, adnexae- no masses, non-tender, Bartholin's glands, urethra, Aspinwall's glands negative, positive findings:  vaginal mucosa atrophy  post hysterectomy status, vaginal cuff well healed  MS: extremities normal- no gross deformities noted  NEURO: Normal strength and tone, mentation intact and speech normal  PSYCH: mentation appears normal and affect normal/bright  MENTAL STATUS EXAM:  Appearance/Behavior: No apparent distress, Casually groomed and Dressed appropriately for weather  Speech: Normal  Mood/Affect: normal affect  Insight: Adequate      Diagnostic Test  Results:  Results for orders placed or performed in visit on 09/18/17   UA with Microscopic reflex to Culture   Result Value Ref Range    Color Urine Yellow     Appearance Urine Clear     Glucose Urine Negative NEG^Negative mg/dL    Bilirubin Urine Negative NEG^Negative    Ketones Urine Negative NEG^Negative mg/dL    Specific Gravity Urine 1.020 1.003 - 1.035    Blood Urine Negative NEG^Negative    pH Urine 5.0 5.0 - 7.0 pH    Protein Albumin Urine 10 (A) NEG^Negative mg/dL    Urobilinogen mg/dL Normal 0.0 - 2.0 mg/dL    Nitrite Urine Negative NEG^Negative    Leukocyte Esterase Urine Trace (A) NEG^Negative    Source Midstream Urine     WBC Urine O - 2 OTO2^O - 2 /HPF    RBC Urine O - 2 OTO2^O - 2 /HPF    Bacteria Urine Few (A) NEG^Negative /HPF    Squamous Epithelial /LPF Urine Few FEW^Few /LPF    Mucous Urine Present (A) NEG^Negative /LPF   Wet prep   Result Value Ref Range    Specimen Description Vagina     Wet Prep No Trichomonas seen     Wet Prep Clue cells seen (A)     Wet Prep No yeast seen    Chlamydia trachomatis PCR   Result Value Ref Range    Specimen Description Cervix     Chlamydia Trachomatis PCR Negative NEG^Negative   Neisseria gonorrhoeae PCR   Result Value Ref Range    Specimen Descrip Cervix     N Gonorrhea PCR Negative NEG^Negative       ASSESSMENT/PLAN:       Aleksandra was seen today for depression, ear problem and vaginal problem.    Diagnoses and all orders for this visit:    Vaginal discharge  -     Wet prep  -     Chlamydia trachomatis PCR  -     Neisseria gonorrhoeae PCR    Bacterial vaginosis  -     metroNIDAZOLE (FLAGYL) 500 MG tablet; Take 1 tablet (500 mg) by mouth 2 times daily for 7 days    Situational depression  -     citalopram (CELEXA) 10 MG tablet; TAKE 1 TABLET (10 MG) BY MOUTH DAILY  Long discussion regarding the nature of depression including the internal vs external triggers.  Discussed risks, benefits, side effects, and alternatives of therapy. Would continue the present course  of therapy.  Discussed the idea of a trial off meds at some point, usually in the spring for most chance of success.  Pt would like to continue on meds for now.  Initiate medication with Celexa 10 mg q day  Reviewed concept of depression as function of biochemical imbalance of neurotransmitters/rationale for treatment.  Risks and benefits of medication(s) reviewed with patient.  Questions answered.  Counseling advised  Patient instructed to call for significant side effects medications or problems  Patient advised immediate presentation to hospital for suicidal thought, etc.      Cystocele, unspecified  -     UA with Microscopic reflex to Culture  -     UROLOGY ADULT REFERRAL    Impacted cerumen of right ear  -     REMOVE IMPACTED CERUMEN    Need for prophylactic vaccination and inoculation against influenza  -     FLU VACCINE, INCREASED ANTIGEN, PRESV FREE, AGE 65+ [64293]  -     Vaccine Administration, Initial [51885]    PLAN:   Patient needs to follow up in if no improvement,or sooner if worsening of symptoms or other symptoms develop.  CONSULTATION/REFERRAL to urology   Will follow up and/or notify patient on results via phone or mail to determine further need for followup  Schedule physical exam in January after the 17 th       See Patient Instructions    VARSHA Perkins CNP  [facility]

## 2017-09-18 NOTE — PATIENT INSTRUCTIONS
PLAN:   1.   Symptomatic therapy suggested: increase fluids and call prn if symptoms persist or worsen.    2.  Orders Placed This Encounter   Medications     citalopram (CELEXA) 10 MG tablet     Sig: TAKE 1 TABLET (10 MG) BY MOUTH DAILY     Dispense:  90 tablet     Refill:  3     metroNIDAZOLE (FLAGYL) 500 MG tablet     Sig: Take 1 tablet (500 mg) by mouth 2 times daily for 7 days     Dispense:  14 tablet     Refill:  0     Orders Placed This Encounter   Procedures     REMOVE IMPACTED CERUMEN     FLU VACCINE, INCREASED ANTIGEN, PRESV FREE, AGE 65+ [14793]     Vaccine Administration, Initial [50007]     UA with Microscopic reflex to Culture     UROLOGY ADULT REFERRAL       3. Patient needs to follow up in if no improvement,or sooner if worsening of symptoms or other symptoms develop.  CONSULTATION/REFERRAL to urology   Will follow up and/or notify patient on results via phone or mail to determine further need for followup  Schedule physical exam in January after the 17 th     It was a pleasure seeing you today at the [facility]. Thank you for allowing us to care for you today. We truly hope we provided you with the excellent service you deserve. Please let us know if there is anything else we can do for you so we can be sure you are leaving completley satisfied with your care experience.       General information about your clinic   Clinic Hours Lab Hours (Appointments are required)   Mon-Thurs: 7:30 AM - 7 PM Mon-Thurs: 7:30 AM - 7 PM   Fri: 7:30 AM - 5 PM Fri: 7:30 AM - 5 PM        After Hours Nurse Advise & Appts:  [facility] Nurse Advisors: [phone]  [facility] On Call: to make appointments anytime: [phone] On Call Physician: call [phone] and answering service will page the on call physician.        For urgent appointments, please call [phone] and ask for the triage nurse or your care team clinic nurse.  How to contact my care team:  MyChart: [email or web address]    Phone: [phone]   Fax: [phone]       [facility]:   Phone: [phone]  Hours: 8:00 AM - 6:00 PM  Medication Refills:  Call your pharmacy and they will forward the refill to us. Please allow 3 business days for your refills to be completed.       Normal or non-critical lab and imaging results will be communicated to you by MyChart, letter or phone within 7 days.  If you do not hear from us within 10 days, please call the clinic. If you have a critical or abnormal lab result, we will notify you by phone as soon as possible.       We now have PWIC (Pediatric Walk in Care)  Monday-Friday from 7:30-4. Simply walk in and be seen for your urgent needs like cough, fever, rash, diarrhea or vomiting, pink eye, UTI. No appointments needed. Ask one of the team for more information      -Your Care Team:    Dr. Ron Butterfield - Internal Medicine/Pediatrics   Dr. Casimiro Flood - Family Medicine  Dr. Barbra Pennington - Pediatrics  Dr. Eva Thomas - Pediatrics  Stephany Hannon CNP - Family Practice Nurse Practitioner                 Bacterial Vaginosis    You have a vaginal infection called bacterial vaginosis (BV). Both good and bad bacteria are present in a healthy vagina. BV occurs when these bacteria get out of balance. The number of bad bacteria increase. And the number of good bacteria decrease.  BV may or may not cause symptoms. If symptoms do occur, they can include:    Thin, gray, milky-white, or sometimes green discharge    Unpleasant odor or  fishy  smell    Itching, burning, or pain in or around the vagina  It is not known what causes BV, but certain factors can make the problem more likely. This can include:    Douching    Having sex with a new partner    Having sex with more than one partner  BV will sometimes go away on its own. But treatment is usually recommended. This is because untreated BV can increase the risk of more serious health problems such as:    Pelvic inflammatory disease (PID)      delivery (giving birth to a baby early if you re pregnant)    HIV and certain other sexually transmitted diseases (STDs)    Infection after surgery on the reproductive organs  Home care  General care    BV is most often treated with medicines called antibiotics. These may be given as pills or as a vaginal cream. If antibiotics are prescribed, be sure to use them exactly as directed. Also, be sure to complete all of the medicine, even if your symptoms go away.    Avoid douching or having sex during treatment.    If you have sex with a female partner, ask your healthcare provider if she should also be treated.  Prevention    Limit or avoid douching.    Avoid having sex. If you do have sex, then take steps to lower your risk:    Use condoms when having sex.    Limit the number of partners you have sex with.  Follow-up care  Follow up with your healthcare provider, or as advised.  When to seek medical advice  Call your healthcare provider right away if:    You have a fever of 100.4 F (38 C) or higher, or as directed by your provider.    Your symptoms worsen, or they don t go away within a few days of starting treatment.    You have new pain in the lower belly or pelvic region.    You have side effects that bother you or a reaction to the pills or cream you re prescribed.    You or any partners you have sex with have new symptoms, such as a rash, joint pain, or sores.  Date Last Reviewed: 7/30/2015 2000-2017 The "[employer]. [address] [place] [address]. All rights reserved. This information is not intended as a substitute for professional medical care. Always follow your healthcare professional's instructions.

## 2017-09-18 NOTE — PROGRESS NOTES
Injectable Influenza Immunization Documentation    1.  Is the person to be vaccinated sick today? NO    2. Does the person to be vaccinated have an allergy to a component   of the vaccine? NO    3. Has the person to be vaccinated ever had a serious reaction   to influenza vaccine in the past? NO    4. Has the person to be vaccinated ever had Guillain-Barré syndrome? NO    Form completed by MEENU Jordan

## 2017-09-18 NOTE — MR AVS SNAPSHOT
After Visit Summary   9/18/2017    Aleksandra Michel    MRN: [id number]           Patient Information     Date Of Birth          1950        Visit Information        Provider Department      9/18/2017 11:10 AM Stephany Hannon APRN CNP [facility]        Today's Diagnoses     Vaginal discharge    -  1    Situational depression        Cystocele, unspecified        Impacted cerumen of right ear        Need for prophylactic vaccination and inoculation against influenza        Bacterial vaginosis          Care Instructions    PLAN:   1.   Symptomatic therapy suggested: increase fluids and call prn if symptoms persist or worsen.    2.  Orders Placed This Encounter   Medications     citalopram (CELEXA) 10 MG tablet     Sig: TAKE 1 TABLET (10 MG) BY MOUTH DAILY     Dispense:  90 tablet     Refill:  3     metroNIDAZOLE (FLAGYL) 500 MG tablet     Sig: Take 1 tablet (500 mg) by mouth 2 times daily for 7 days     Dispense:  14 tablet     Refill:  0     Orders Placed This Encounter   Procedures     REMOVE IMPACTED CERUMEN     FLU VACCINE, INCREASED ANTIGEN, PRESV FREE, AGE 65+ [49791]     Vaccine Administration, Initial [86757]     UA with Microscopic reflex to Culture     UROLOGY ADULT REFERRAL       3. Patient needs to follow up in if no improvement,or sooner if worsening of symptoms or other symptoms develop.  CONSULTATION/REFERRAL to urology   Will follow up and/or notify patient on results via phone or mail to determine further need for followup  Schedule physical exam in January after the 17 th     It was a pleasure seeing you today at the [facility]. Thank you for allowing us to care for you today. We truly hope we provided you with the excellent service you deserve. Please let us know if there is anything else we can do for you so we can be sure you are leaving completley satisfied with your care experience.       General information about your  clinic   Clinic Hours Lab Hours (Appointments are required)   Mon-Thurs: 7:30 AM - 7 PM Mon-Thurs: 7:30 AM - 7 PM   Fri: 7:30 AM - 5 PM Fri: 7:30 AM - 5 PM        After Hours Nurse Advise & Appts:  [facility]: [phone]  [facility] On Call: to make appointments anytime: [phone] On Call Physician: call [phone] and answering service will page the on call physician.        For urgent appointments, please call [phone] and ask for the triage nurse or your care team clinic nurse.  How to contact my care team:  MyChart: [email or web address]   Phone: [phone]   Fax: [phone]       [facility]:   Phone: [phone]  Hours: 8:00 AM - 6:00 PM  Medication Refills:  Call your pharmacy and they will forward the refill to us. Please allow 3 business days for your refills to be completed.       Normal or non-critical lab and imaging results will be communicated to you by MyChart, letter or phone within 7 days.  If you do not hear from us within 10 days, please call the clinic. If you have a critical or abnormal lab result, we will notify you by phone as soon as possible.       We now have PWIC (Pediatric Walk in Care)  Monday-Friday from 7:30-4. Simply walk in and be seen for your urgent needs like cough, fever, rash, diarrhea or vomiting, pink eye, UTI. No appointments needed. Ask one of the team for more information      -Your Care Team:    Dr. Ron Butterfield - Internal Medicine/Pediatrics   Dr. Casimiro Flood - Family Medicine  Dr. Barbra Pennington - Pediatrics  Dr. Eva Thomas - Pediatrics  Stephany Hannon CNP - Family Practice Nurse Practitioner                 Bacterial Vaginosis    You have a vaginal infection called bacterial vaginosis (BV). Both good and bad bacteria are present in a healthy vagina. BV occurs when these bacteria get out of balance. The number of bad bacteria increase. And the number of good bacteria decrease.  BV may or may not cause symptoms. If symptoms do occur,  they can include:    Thin, gray, milky-white, or sometimes green discharge    Unpleasant odor or  fishy  smell    Itching, burning, or pain in or around the vagina  It is not known what causes BV, but certain factors can make the problem more likely. This can include:    Douching    Having sex with a new partner    Having sex with more than one partner  BV will sometimes go away on its own. But treatment is usually recommended. This is because untreated BV can increase the risk of more serious health problems such as:    Pelvic inflammatory disease (PID)     delivery (giving birth to a baby early if you re pregnant)    HIV and certain other sexually transmitted diseases (STDs)    Infection after surgery on the reproductive organs  Home care  General care    BV is most often treated with medicines called antibiotics. These may be given as pills or as a vaginal cream. If antibiotics are prescribed, be sure to use them exactly as directed. Also, be sure to complete all of the medicine, even if your symptoms go away.    Avoid douching or having sex during treatment.    If you have sex with a female partner, ask your healthcare provider if she should also be treated.  Prevention    Limit or avoid douching.    Avoid having sex. If you do have sex, then take steps to lower your risk:    Use condoms when having sex.    Limit the number of partners you have sex with.  Follow-up care  Follow up with your healthcare provider, or as advised.  When to seek medical advice  Call your healthcare provider right away if:    You have a fever of 100.4 F (38 C) or higher, or as directed by your provider.    Your symptoms worsen, or they don t go away within a few days of starting treatment.    You have new pain in the lower belly or pelvic region.    You have side effects that bother you or a reaction to the pills or cream you re prescribed.    You or any partners you have sex with have new symptoms, such as a rash, joint pain,  or sores.  Date Last Reviewed: 7/30/2015 2000-2017 [employer]. [address] [place] [address], [place] [address]. All rights reserved. This information is not intended as a substitute for professional medical care. Always follow your healthcare professional's instructions.                Follow-ups after your visit        Additional Services     UROLOGY ADULT REFERRAL       Your provider has referred you to: FM: [facility] [phone]   [email or web address]    Please be aware that coverage of these services is subject to the terms and limitations of your health insurance plan.  Call member services at your health plan with any benefit or coverage questions.      Please bring the following with you to your appointment:    (1) Any X-Rays, CTs or MRIs which have been performed.  Contact the facility where they were done to arrange for  prior to your scheduled appointment.    (2) List of current medications  (3) This referral request   (4) Any documents/labs given to you for this referral                  Who to contact     If you have questions or need follow up information about today's clinic visit or your schedule please contact [facility] directly at [phone].  Normal or non-critical lab and imaging results will be communicated to you by [employer], letter or phone within 4 business days after the clinic has received the results. If you do not hear from us within 7 days, please contact the clinic through [employer] or phone. If you have a critical or abnormal lab result, we will notify you by phone as soon as possible.  Submit refill requests through [employer] or call your pharmacy and they will forward the refill request to us. Please allow 3 business days for your refill to be completed.          Additional Information About Your Visit        [employer] Information     [employer] is an electronic gateway  that provides easy, online access to your medical records. With [employer], you can request a clinic appointment, read your test results, renew a prescription or communicate with your care team.     To sign up for [employer] visit the website at [email or web address]   You will be asked to enter the access code listed below, as well as some personal information. Please follow the directions to create your username and password.     Your access code is: [email or web address]  Expires: 2017 12:30 PM     Your access code will  in 90 days. If you need help or a new code, please contact your [facility] or call [phone] for assistance.        Care EveryWhere ID     This is your Care EveryWhere ID. This could be used by other organizations to access your [facility] medical records  [id number]        Your Vitals Were     Pulse Temperature Pulse Oximetry Breastfeeding? BMI (Body Mass Index)       78 97.7  F (36.5  C) (Temporal) 96% No 22.83 kg/m2        Blood Pressure from Last 3 Encounters:   17 100/60   17 101/63   16 107/73    Weight from Last 3 Encounters:   17 128 lb 14.4 oz (58.5 kg)   17 119 lb 6.4 oz (54.2 kg)   16 122 lb 6.4 oz (55.5 kg)              We Performed the Following     Chlamydia trachomatis PCR     FLU VACCINE, INCREASED ANTIGEN, PRESV FREE, AGE 65+ [86696]     Neisseria gonorrhoeae PCR     REMOVE IMPACTED CERUMEN     UA with Microscopic reflex to Culture     UROLOGY ADULT REFERRAL     Vaccine Administration, Initial [28284]     Wet prep          Today's Medication Changes          These changes are accurate as of: 17 12:30 PM.  If you have any questions, ask your nurse or doctor.               Start taking these medicines.        Dose/Directions    metroNIDAZOLE 500 MG tablet   Commonly known as:  FLAGYL   Used for:  Bacterial vaginosis   Started by:  Stephany Hannon APRN CNP        Dose:  500 mg   Take 1 tablet (500  mg) by mouth 2 times daily for 7 days   Quantity:  14 tablet   Refills:  0         These medicines have changed or have updated prescriptions.        Dose/Directions    citalopram 10 MG tablet   Commonly known as:  celeXA   This may have changed:  See the new instructions.   Used for:  Situational depression   Changed by:  Stephany Hannon APRN CNP        TAKE 1 TABLET (10 MG) BY MOUTH DAILY   Quantity:  90 tablet   Refills:  3            Where to get your medicines      These medications were sent to [facility]/pharmacy #[id number] - [facility] [address] [place] [address]  [address] [facility] [address]     Phone:  [phone]     citalopram 10 MG tablet    metroNIDAZOLE 500 MG tablet                Primary Care Provider Office Phone # Fax #    VARSHA Perkins [phone] [address]  [place] [address]        Equal Access to Services     [facility]: Hadii joey ku hadasho Soomaali, waaxda luqadaha, qaybta kaalmada adeegyada, waxay kevin hayaaotto velez . So [facility] [phone].    ATENCIÓN: Si habla español, tiene a awad disposición servicios gratuitos de asistencia lingüística. Yoon al [phone].    We comply with applicable federal civil rights laws and [place] laws. We do not discriminate on the basis of race, color, national origin, age, disability sex, sexual orientation or gender identity.            Thank you!     Thank you for choosing [facility]  for your care. Our goal is always to provide you with excellent care. Hearing back from our patients is one way we can continue to improve our services. Please take a few minutes to complete the written survey that you may receive in the mail after your visit with us. Thank you!             Your Updated Medication List - Protect others around you: Learn how to safely use, store and throw away your medicines at www.disposemymeds.org.          This list is accurate as of:  9/18/17 12:30 PM.  Always use your most recent med list.                   Brand Name Dispense Instructions for use Diagnosis    CALCIUM + D PO      Take 1 tablet by mouth daily. Takes 3 tablets in morning and 2 tablets in evening        cetirizine 10 MG tablet    zyrTEC     Take 10 mg by mouth daily as needed for allergies        citalopram 10 MG tablet    celeXA    90 tablet    TAKE 1 TABLET (10 MG) BY MOUTH DAILY    Situational depression       * MULTI COMPLETE PO           DAILY MULTIVITAMIN PO      Take 1 tablet by mouth daily.        * PRESERVISION/LUTEIN PO      Take 1 tablet by mouth daily.        metroNIDAZOLE 500 MG tablet    FLAGYL    14 tablet    Take 1 tablet (500 mg) by mouth 2 times daily for 7 days    Bacterial vaginosis       UNABLE TO FIND      daily MEDICATION NAME: Osteo supplement for bones. Takes liquid once daily        UNKNOWN TO PATIENT      Hormone cream from Gyno-uses twice per week        vitamin B complex with vitamin C Tabs tablet      Take 1 tablet by mouth daily        * Notice:  This list has 2 medication(s) that are the same as other medications prescribed for you. Read the directions carefully, and ask your doctor or other care provider to review them with you.

## 2017-09-19 LAB
C TRACH DNA SPEC QL NAA+PROBE: NEGATIVE
N GONORRHOEA DNA SPEC QL NAA+PROBE: NEGATIVE
SPECIMEN SOURCE: NORMAL
SPECIMEN SOURCE: NORMAL

## 2017-09-22 ENCOUNTER — TELEPHONE (OUTPATIENT)
Dept: PEDIATRICS | Facility: CLINIC | Age: 67
End: 2017-09-22

## 2017-09-22 NOTE — TELEPHONE ENCOUNTER
UA with Microscopic reflex to Culture   Status:  Final result   Visible to patient:  No (Inaccessible in MyChart) Dx:  Cystocele, unspecified Order: [id number]       Notes Recorded by Stephany Hannon APRN CNP on 9/21/2017 at 10:36 PM  Please let patient know  -Yeast vaginal infection test is normal - no signs of a yeast infection.  -Bacterial vaginal infection test is POSITIVE    ADVISE treatment with metronidazole 500mg twice daily orally for 7 days and a prescription has been sent to your pharmacy  -Trichomonas vaginal infection test is normal - no signs of a trichomonas infection.  -Chlamydia and gonnohrea tests are normal.]  ------    Notes Recorded by Stephany Hannon APRN CNP on 9/18/2017 at 11:17 PM  Discussed results at time of visit.          Ref Range & Units 4d ago       Color Urine  Yellow     Appearance Urine  Clear     Glucose Urine NEG^Negative mg/dL Negative     Bilirubin Urine NEG^Negative Negative     Ketones Urine NEG^Negative mg/dL Negative     Specific Gravity Urine 1.003 - 1.035 1.020     Blood Urine NEG^Negative Negative     pH Urine 5.0 - 7.0 pH 5.0     Protein Albumin Urine NEG^Negative mg/dL 10 (A)     Urobilinogen mg/dL 0.0 - 2.0 mg/dL Normal     Nitrite Urine NEG^Negative Negative     Leukocyte Esterase Urine NEG^Negative Trace (A)     Source  Midstream Urine     WBC Urine OTO2^O - 2 /HPF O - 2     RBC Urine OTO2^O - 2 /HPF O - 2     Bacteria Urine NEG^Negative /HPF Few (A)     Squamous Epithelial /LPF Urine FEW^Few /LPF Few     Mucous Urine NEG^Negative /LPF Present (A)     Resulting Agency  [facility]          Specimen Collected: 09/18/17 12:10 PM Last Resulted: 09/18/17 12:44 PM                                 Chlamydia trachomatis PCR   Status:  Final result   Visible to patient:  No (Inaccessible in MyChart) Dx:  Vaginal discharge Order: [id number]       Notes Recorded by Stephany Hannon APRN CNP on 9/21/2017 at 10:36 PM  Please let patient know  -Yeast vaginal infection  test is normal - no signs of a yeast infection.  -Bacterial vaginal infection test is POSITIVE    ADVISE treatment with metronidazole 500mg twice daily orally for 7 days and a prescription has been sent to your pharmacy  -Trichomonas vaginal infection test is normal - no signs of a trichomonas infection.  -Chlamydia and gonnohrea tests are normal.]           Ref Range & Units 4d ago  (9/18/17) 4d ago  (9/18/17)      Specimen Description  Cervix Vagina      Chlamydia Trachomatis PCR NEG^Negative Negative      Comments: Negative for C. trachomatis rRNA by transcription mediated amplification.   A negative result by transcription mediated amplification does not preclude   the presence of C. trachomatis infection because results are dependent on   proper and adequate collection, absence of inhibitors, and sufficient rRNA to   be detected.        Resulting Agency  [facility]  [facility]          Specimen Collected: 09/18/17 11:50 AM Last Resulted: 09/19/17  1:23 PM                                 Neisseria gonorrhoeae PCR   Status:  Final result   Visible to patient:  No (Inaccessible in MyChart) Dx:  Vaginal discharge Order: [id number]       Notes Recorded by Stephany Hannon APRN CNP on 9/21/2017 at 10:36 PM  Please let patient know  -Yeast vaginal infection test is normal - no signs of a yeast infection.  -Bacterial vaginal infection test is POSITIVE    ADVISE treatment with metronidazole 500mg twice daily orally for 7 days and a prescription has been sent to your pharmacy  -Trichomonas vaginal infection test is normal - no signs of a trichomonas infection.  -Chlamydia and gonnohrea tests are normal.]        Ref Range & Units 4d ago   Resulting Agency     Specimen Descrip  Cervix [facility]     N Gonorrhea PCR NEG^Negative Negative [facility]     Comments: Negative for N. gonorrhoeae rRNA by transcription mediated amplification.   A negative result by transcription  mediated amplification does not preclude   the presence of N. gonorrhoeae infection because results are dependent on   proper and adequate collection, absence of inhibitors, and sufficient rRNA to   be detected.             Specimen Collected: 09/18/17 11:50 AM Last Resulted: 09/19/17  1:23 PM                                Wet prep   Status:  Final result   Visible to patient:  No (Inaccessible in MyChart) Dx:  Vaginal discharge Order: [id number]       Notes Recorded by Stephany Hannon APRN CNP on 9/21/2017 at 10:36 PM  Please let patient know  -Yeast vaginal infection test is normal - no signs of a yeast infection.  -Bacterial vaginal infection test is POSITIVE    ADVISE treatment with metronidazole 500mg twice daily orally for 7 days and a prescription has been sent to your pharmacy  -Trichomonas vaginal infection test is normal - no signs of a trichomonas infection.  -Chlamydia and gonnohrea tests are normal.]  ------    Notes Recorded by Stephany Hannon APRN CNP on 9/18/2017 at 11:17 PM  Discussed results at time of visit.          Newer results are available. Click to view them now.                4d ago       Specimen Description Vagina     Wet Prep No Trichomonas seen     Wet Prep Clue cells seen (A)     Wet Prep No yeast seen     Resulting Agency [facility]          Specimen Collected: 09/18/17 11:50 AM Last Resulted: 09/18/17 12:16 PM                      Poornima Rob RN

## 2017-09-22 NOTE — TELEPHONE ENCOUNTER
Called patient and gave message per Shelbi Hannon NP.  Patient verbalized understanding and agreement to plan. Hiral Mercedes RN, [facility]

## 2017-09-28 ENCOUNTER — PRE VISIT (OUTPATIENT)
Dept: UROLOGY | Facility: CLINIC | Age: 67
End: 2017-09-28

## 2017-09-28 NOTE — TELEPHONE ENCOUNTER
PREVISIT INFORMATION                                                    Aleksandra Michel scheduled for future visit at [facility] specialty clinics.    Patient is scheduled to see Lai  on 09/29  Reason for visit: Cystocele  Referring provider: Stephany Hannon APRN CNP  Has patient seen previous specialist?   Medical Records:  Left message to return call to clinic     REVIEW                                                      New patient packet mailed to patient: yes   Medication reconciliation complete: No      PLAN/FOLLOW-UP NEEDED                                                      Patient Reminders Given:    Informed patient to bring an updated list of allergies, medications, pharmacy details and insurance information. Directed patient to come to the 2nd floor, check-in D for their appointment. Informed patient to call back if appointment needs to be cancelled or rescheduled at [phone].    Reminded patient to bring any outside records regarding this appointment or have them faxed to clinic at [phone].    Reminded patient to complete and bring in urology questionnaire. Also for patient to please come with a full bladder and to ask , if early to get staff member for sample.

## 2017-09-29 ENCOUNTER — OFFICE VISIT (OUTPATIENT)
Dept: UROLOGY | Facility: CLINIC | Age: 67
End: 2017-09-29
Attending: NURSE PRACTITIONER
Payer: COMMERCIAL

## 2017-09-29 VITALS
SYSTOLIC BLOOD PRESSURE: 104 MMHG | TEMPERATURE: 98.2 F | DIASTOLIC BLOOD PRESSURE: 65 MMHG | HEART RATE: 83 BPM | OXYGEN SATURATION: 95 %

## 2017-09-29 DIAGNOSIS — N81.11 CYSTOCELE, MIDLINE: ICD-10-CM

## 2017-09-29 DIAGNOSIS — M62.89 PELVIC FLOOR DYSFUNCTION: Primary | ICD-10-CM

## 2017-09-29 PROCEDURE — 51798 US URINE CAPACITY MEASURE: CPT | Performed by: PHYSICIAN ASSISTANT

## 2017-09-29 PROCEDURE — 99203 OFFICE O/P NEW LOW 30 MIN: CPT | Mod: 25 | Performed by: PHYSICIAN ASSISTANT

## 2017-09-29 ASSESSMENT — PAIN SCALES - GENERAL: PAINLEVEL: NO PAIN (0)

## 2017-09-29 NOTE — PROGRESS NOTES
"CC: vaginal bulge    HPI: It is a pleasure to see Aleksandra Michel, a very pleasant 67 year old female asked to be seen in consultation by VARSHA Nieto, CNP for evaluation of the above. Patient reports she has noticed a slight vaginal bulge or sensation of \"something dropping\" for the past year. She thinks this occurred after having the [place] vaginal rejuvenation treatment 18 months ago. The bulge is not painful and does not extend beyond the vaginal introitus. Her  is able to feel \"something\" during intercourse which is a change from prior. She denies pelvic pain or dyspareunia. Denies difficulty urinating. No frequency/urgency. Voids q4 hours during the day, nocturia x 1. Occasionally feels to not empty all the way, but only when she's in a hurry. She also thinks she is leaking slightly. This does not occur specifically with coughing, sneezing, or bending. Also does not occur with strong urges. It happens randomly but is very small volume (drops). Goes through 1-2 thin panty liners per day but she states these smell like urine when she changes them.    Has had one UTI in her lifetime (in ). Denies dysuria, hematuria, vaginal discharge. No issues with constipation.     Hysterectomy with \"bladder lift\" at age 35. The patient is .  Babies were delivered vaginally. She is on vaginal HRT with testosterone/estradiol combination qod.      Past Medical History:   Diagnosis Date     Allergic rhinitis, cause unspecified     allergy shots in the past.      Major depressive disorder, single episode     noted per past records with [facility] physicians     Nosebleed     er visit for tamponade while on nasonex.      Past Surgical History:   Procedure Laterality Date     ENHANCE LASER REFRACTIVE BILATERAL EXISTING PT IN PARAMETERS Right ~    Distance eye     HYSTERECTOMY, VAGINAL  1985    age 36, prolonged menses. ovaries remain     Current Outpatient Prescriptions   Medication Sig Dispense Refill     " citalopram (CELEXA) 10 MG tablet TAKE 1 TABLET (10 MG) BY MOUTH DAILY 90 tablet 3     Multiple Vitamins-Minerals (MULTI COMPLETE PO)        UNABLE TO FIND daily MEDICATION NAME: Osteo supplement for bones. Takes liquid once daily       cetirizine (ZYRTEC) 10 MG tablet Take 10 mg by mouth daily as needed for allergies       vitamin B complex with vitamin C (VITAMIN  B COMPLEX) TABS tablet Take 1 tablet by mouth daily       UNKNOWN TO PATIENT Hormone cream from Gyno-uses twice per week       Calcium Carbonate-Vitamin D (CALCIUM + D PO) Take 1 tablet by mouth daily. Takes 3 tablets in morning and 2 tablets in evening       Multiple Vitamin (DAILY MULTIVITAMIN PO) Take 1 tablet by mouth daily.       Multiple Vitamins-Minerals (PRESERVISION/LUTEIN PO) Take 1 tablet by mouth daily.       Allergies   Allergen Reactions     Dust Mites      Mold      Seasonal Allergies      Family History: There is no h/o  malignancy.  There is no h/o urolithiasis.    Social History: The patient does not smoke cigarettes, minimal EtOH and no illicit drug use.    ROS: A comprehensive 14 point ROS was obtained and was positive for osteoporosis, varicose veins and otherwise negative except for that outlined above in the HPI.    PHYSICAL EXAM:   Vitals:    09/29/17 1449   BP: 104/65   BP Location: Left arm   Patient Position: Chair   Cuff Size: Adult Regular   Pulse: 83   Temp: 98.2  F (36.8  C)   TempSrc: Oral   SpO2: 95%     GENERAL: Well groomed, well developed, well nourished female in NAD.  HEENT: EOMI, AT, NC.  SKIN: Warm to touch, dry.  No visible rashes or lesions on examined areas.  RESP: No increased respiratory effort.   LYMPH: No LE edema.  ABD: Soft, NT, ND.  No CVAT bilaterally.  MS: Full ROM in extremities.  PELVIC:       Examined in dorsal lithotomy position with and without speculum.      Normal external genitalia, small vaginal introitus, minimal atrophic changes.      Urethra patent. Minimal urethral hypermobility with  Valsalva.      Stress incontinence was not demonstrated with coughing/Valsalva.      Mild degree of anterior wall prolapse. Some apical descent as well.       Moderate tenderness to palpation of pelvic floor muscles with increased tone (obturators > levators). Kegels 3/5.      Perineum wnl, no palpable masses.   NEURO: Alert and oriented x 3.  PSYCH: Normal mood and affect, pleasant and agreeable during interview and exam.    PVR: Postvoid residual urine volume as measured by ultrasound was 0 mL.    LABORATORY:   Recent UA from 9/18/17 unremarkable.   Wet prep from 9/18/17 positive for BV, o/w negative.    ASSESSMENT/PLAN:  Ms. Aleksandra Michel is a 67 year old female with complaints of a vaginal bulge with mild degree of anterior wall prolapse and some apical descent noted on exam. She also has moderate myofascial tenderness of the pelvic floor (obturators > levators).     We discussed how her pelvic floor symptoms may be related to her myofascial pelvic floor dysfunction as noted on physical exam today.  We discussed how the recommended treatment is dedicated pelvic floor therapy.  We discussed how the therapy works. Offered to place referral but patient declines at this time.   -She verbalizes desire to meet with one of our urologists to further discuss surgical approaches  -Will schedule consultation visit with Dr. Liang next available. She understands that surgery may not be recommended until she completes a course of pelvic floor PT, but she still wishes to meet with urologist first. Would be willing to do PT if recommended to her again.    I have enjoyed participating in the medical care of this very pleasant patient.  Please don't hesitate to contact me with any questions or concerns.     Erika Osei PA-C  Department of Urology

## 2017-09-29 NOTE — PATIENT INSTRUCTIONS
UROLOGY CLINIC VISIT PATIENT INSTRUCTIONS    1) Please follow up with Dr. Pricila Liang to further discuss treatment options.     If you have any issues, questions or concerns in the meantime, do not hesitate to contact us at [phone] or via [employer].     It was a pleasure meeting with you today.  Thank you for allowing me and my team the privilege of caring for you today.  YOU are the reason we are here, and I truly hope we provided you with the excellent service you deserve.  Please let us know if there is anything else we can do for you so that we can be sure you are leaving completely satisfied with your care experience.

## 2017-09-29 NOTE — MR AVS SNAPSHOT
After Visit Summary   9/29/2017    Aleksandra Michel    MRN: [id number]           Patient Information     Date Of Birth          1950        Visit Information        Provider Department      9/29/2017 3:00 PM Erika Osei PA-C [facility]        Today's Diagnoses     Cystocele, midline    -  1      Care Instructions    UROLOGY CLINIC VISIT PATIENT INSTRUCTIONS    1) Please follow up with Dr. Pricila Liang to further discuss treatment options.     If you have any issues, questions or concerns in the meantime, do not hesitate to contact us at [phone] or via [employer].     It was a pleasure meeting with you today.  Thank you for allowing me and my team the privilege of caring for you today.  YOU are the reason we are here, and I truly hope we provided you with the excellent service you deserve.  Please let us know if there is anything else we can do for you so that we can be sure you are leaving completely satisfied with your care experience.                Follow-ups after your visit        Your next 10 appointments already scheduled     Oct 24, 2017  3:00 PM CDT   New Visit with Pricila Liang MD   [facility] ([facility])    [address] [place] [id number] [phone]              Who to contact     If you have questions or need follow up information about today's clinic visit or your schedule please contact [facility] directly at [phone].  Normal or non-critical lab and imaging results will be communicated to you by MyChart, letter or phone within 4 business days after the clinic has received the results. If you do not hear from us within 7 days, please contact the clinic through [employer] or phone. If you have a critical or abnormal lab result, we will notify you by phone as soon as possible.  Submit refill requests through [employer] or call your pharmacy and they will forward the refill  request to us. Please allow 3 business days for your refill to be completed.          Additional Information About Your Visit        [employer] Information     [employer] is an electronic gateway that provides easy, online access to your medical records. With [employer], you can request a clinic appointment, read your test results, renew a prescription or communicate with your care team.     To sign up for [employer] visit the website at [email or web address]   You will be asked to enter the access code listed below, as well as some personal information. Please follow the directions to create your username and password.     Your access code is: [email or web address]  Expires: 2017 12:30 PM     Your access code will  in 90 days. If you need help or a new code, please contact your [facility] or call [phone] for assistance.        Care EveryWhere ID     This is your Care EveryWhere ID. This could be used by other organizations to access your [place] medical records  [id number]        Your Vitals Were     Pulse Temperature Pulse Oximetry             83 98.2  F (36.8  C) (Oral) 95%          Blood Pressure from Last 3 Encounters:   17 104/65   17 100/60   17 101/63    Weight from Last 3 Encounters:   17 58.5 kg (128 lb 14.4 oz)   17 54.2 kg (119 lb 6.4 oz)   16 55.5 kg (122 lb 6.4 oz)              We Performed the Following     MEASURE POST-VOID RESIDUAL URINE/BLADDER CAPACITY, US NON-IMAGING        Primary Care Provider Office Phone # Fax #    Stephany Elizabeth Hannon, APRN [facility] [phone]       [address]  [place] [address]        Equal Access to Services     DONALD DOMINGUEZ : Hadii aad katiuska adriano Somaximinoali, waaxda luqadaha, qaybta kaalmada adeegyada, lisa feng. So [facility] [phone].    ATENCIÓN: Si habla español, tiene a awad disposición servicios gratuitos de asistencia lingüística. Llame al  [phone].    We comply with applicable federal civil rights laws and [place] laws. We do not discriminate on the basis of race, color, national origin, age, disability, sex, sexual orientation, or gender identity.            Thank you!     Thank you for choosing [facility]  for your care. Our goal is always to provide you with excellent care. Hearing back from our patients is one way we can continue to improve our services. Please take a few minutes to complete the written survey that you may receive in the mail after your visit with us. Thank you!             Your Updated Medication List - Protect others around you: Learn how to safely use, store and throw away your medicines at www.disposemymeds.org.          This list is accurate as of: 9/29/17  3:54 PM.  Always use your most recent med list.                   Brand Name Dispense Instructions for use Diagnosis    cetirizine 10 MG tablet    zyrTEC     Take 10 mg by mouth daily as needed for allergies        citalopram 10 MG tablet    celeXA    90 tablet    TAKE 1 TABLET (10 MG) BY MOUTH DAILY    Situational depression       * MULTI COMPLETE PO           DAILY MULTIVITAMIN PO      Take 1 tablet by mouth daily.        * PRESERVISION/LUTEIN PO      Take 1 tablet by mouth daily.        * UNABLE TO FIND      MEDICATION NAME: [employer] Gevity  Liquid calcium        * UNABLE TO FIND      daily MEDICATION NAME: Osteo supplement for bones. Takes liquid once daily        UNKNOWN TO PATIENT      Hormone cream from Gyno-uses twice per week        vitamin B complex with vitamin C Tabs tablet      Take 1 tablet by mouth daily        * Notice:  This list has 4 medication(s) that are the same as other medications prescribed for you. Read the directions carefully, and ask your doctor or other care provider to review them with you.

## 2017-09-29 NOTE — NURSING NOTE
"Aleksandra Michel's goals for this visit include:   Chief Complaint   Patient presents with     Consult     cysfermin      She requests these members of her care team be copied on today's visit information: pcp    Referring Provider:  Stephany Hannon, APRN CNP  [address]  [place] [address]    Initial /65 (BP Location: Left arm, Patient Position: Chair, Cuff Size: Adult Regular)  Pulse 83  Temp 98.2  F (36.8  C) (Oral)  SpO2 95% Estimated body mass index is 22.83 kg/(m^2) as calculated from the following:    Height as of 1/17/17: 1.6 m (5' 3\").    Weight as of 9/18/17: 58.5 kg (128 lb 14.4 oz).  BP completed using cuff size: regular      "

## 2017-10-06 ENCOUNTER — OFFICE VISIT (OUTPATIENT)
Dept: UROLOGY | Facility: CLINIC | Age: 67
End: 2017-10-06
Payer: COMMERCIAL

## 2017-10-06 VITALS — SYSTOLIC BLOOD PRESSURE: 107 MMHG | HEART RATE: 64 BPM | DIASTOLIC BLOOD PRESSURE: 61 MMHG

## 2017-10-06 DIAGNOSIS — N99.3 VAGINAL VAULT PROLAPSE AFTER HYSTERECTOMY: ICD-10-CM

## 2017-10-06 DIAGNOSIS — M79.18 MYALGIA OF PELVIC FLOOR: Primary | ICD-10-CM

## 2017-10-06 DIAGNOSIS — M62.89 PELVIC FLOOR DYSFUNCTION: ICD-10-CM

## 2017-10-06 DIAGNOSIS — N39.42 URINARY INCONTINENCE WITHOUT SENSORY AWARENESS: ICD-10-CM

## 2017-10-06 LAB
ALBUMIN UR-MCNC: NEGATIVE MG/DL
APPEARANCE UR: CLEAR
BILIRUB UR QL STRIP: NEGATIVE
COLOR UR AUTO: NORMAL
GLUCOSE UR STRIP-MCNC: NEGATIVE MG/DL
HGB UR QL STRIP: NEGATIVE
KETONES UR STRIP-MCNC: NEGATIVE MG/DL
LEUKOCYTE ESTERASE UR QL STRIP: NEGATIVE
NITRATE UR QL: NEGATIVE
PH UR STRIP: 7 PH (ref 5–7)
SOURCE: NORMAL
SP GR UR STRIP: 1 (ref 1–1.03)
UROBILINOGEN UR STRIP-MCNC: NORMAL MG/DL (ref 0–2)

## 2017-10-06 PROCEDURE — 99213 OFFICE O/P EST LOW 20 MIN: CPT | Performed by: UROLOGY

## 2017-10-06 PROCEDURE — 81003 URINALYSIS AUTO W/O SCOPE: CPT | Performed by: UROLOGY

## 2017-10-06 ASSESSMENT — PAIN SCALES - GENERAL: PAINLEVEL: NO PAIN (0)

## 2017-10-06 NOTE — PATIENT INSTRUCTIONS
Websites with free information:    [employer] patient website: www.voicesforpfd.org    Total Control Program: www.totalcontrolprogram.com    Please see one of the dedicated pelvic floor physical therapist ([facility] [phone])    Please return to see me in 4 months, sooner if needed    It was a pleasure meeting with you today.  Thank you for allowing me and my team the privilege of caring for you today.  YOU are the reason we are here, and I truly hope we provided you with the excellent service you deserve.  Please let us know if there is anything else we can do for you so that we can be sure you are leaving completely satisfied with your care experience.      Physical Therapy Referral    Please call [phone] to make an appointment     [facility] - [email or web address]  [facility] - [email or web address]    Locations:    [facility] [place]   [facility]   [facility] [place]
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
## 2017-10-06 NOTE — MR AVS SNAPSHOT
After Visit Summary   10/6/2017    Aleksandra Michel    MRN: [id number]           Patient Information     Date Of Birth          1950        Visit Information        Provider Department      10/6/2017 8:30 AM Pricila Liang MD [facility]        Today's Diagnoses     Myalgia of pelvic floor    -  1    Pelvic floor dysfunction        Urinary incontinence without sensory awareness        Vaginal vault prolapse after hysterectomy          Care Instructions    Websites with free information:    American Urogynec[employer] Society patient website: www.[email or web address]    Total Control Program: www.totalcontrolprogram.com    Please see one of the dedicated pelvic floor physical therapist ([facility] [phone])    Please return to see me in 4 months, sooner if needed    It was a pleasure meeting with you today.  Thank you for allowing me and my team the privilege of caring for you today.  YOU are the reason we are here, and I truly hope we provided you with the excellent service you deserve.  Please let us know if there is anything else we can do for you so that we can be sure you are leaving completely satisfied with your care experience.      Physical Therapy Referral    Please call [phone] to make an appointment     [facility] - [email or web address]  [facility] - [email or web address]    Locations:    [facility] - [facility]-Ortho PT [place]   [place] [facility] - [facility] - [facility]   [facility]   [facility]  [facility]                     Follow-ups after your visit        Additional Services     [facility] Physical Therapy Referral       **This order will print in the [facility] Scheduling Office**    Physical Therapy, Hand Therapy and Chiropractic Care are available through:    *[facility]  *[facility]  *[facility]    Call one number to schedule at any of the above locations: [phone].    Your provider has referred you to: Physical Therapy at [facility] or [facility]    Indication/Reason for Referral: Women's Health (Please Complete Special Programs SmartList)  Onset of Illness: months  Therapy Orders: Evaluate and Treat  Special Programs: None and Women's Health: Pelvic Dysfunction: myofascial tenderness of the pelvic floor  Pelvic Floor Weakness: incontinence of unclear etiology, vaginal vault prolapse and  Biofeedback, E-Stim/TENS/TENS Rental if deemed appropriate by therapist, Exercise/HEP and Myofascial Release/Massage (internal)  Special Request: Exercise: Home Exercise Program  Manual Therapy: Myofascial Release/Massage (internal)  Modalities: As Indicated E-Stim/TENS    Daniel Sanchez      Additional Comments for the Therapist or Chiropractor: Yamini emery please.  Core strengthening    Please be aware that coverage of these services is subject to the terms and limitations of your health insurance plan.  Call member services at your health plan with any benefit or coverage questions.      Please bring the following to your appointment:    *Your personal calendar for scheduling future appointments  *Comfortable clothing                  Your next 10 appointments already scheduled     Feb 13, 2018  3:00 PM CST   Return Visit with Pricila Liang MD   [facility] ([facility]    [address] [place] [id number] [phone]              Who to contact     If you have questions or need follow up  information about today's clinic visit or your schedule please contact [facility] directly at [phone].  Normal or non-critical lab and imaging results will be communicated to you by [employer], letter or phone within 4 business days after the clinic has received the results. If you do not hear from us within 7 days, please contact the clinic through [employer] or phone. If you have a critical or abnormal lab result, we will notify you by phone as soon as possible.  Submit refill requests through ironSource or call your pharmacy and they will forward the refill request to us. Please allow 3 business days for your refill to be completed.          Additional Information About Your Visit        [employer] Information     [employer]Source is an electronic gateway that provides easy, online access to your medical records. With ironSource, you can request a clinic appointment, read your test results, renew a prescription or communicate with your care team.     To sign up for [employer] visit the website at [email or web address]   You will be asked to enter the access code listed below, as well as some personal information. Please follow the directions to create your username and password.     Your access code is: [email or web address]  Expires: 2017 12:30 PM     Your access code will  in 90 days. If you need help or a new code, please contact your [facility] or call [phone] for assistance.        Care EveryWhere ID     This is your Care EveryWhere ID. This could be used by other organizations to access your [facility] medical records  [id number]        Your Vitals Were     Pulse                   64            Blood Pressure from Last 3 Encounters:   10/06/17 107/61   17 104/65   17 100/60    Weight from Last 3 Encounters:   17 58.5 kg (128 lb 14.4 oz)   17 54.2 kg (119 lb 6.4 oz)   16 55.5 kg (122 lb 6.4 oz)              We Performed the Following      [facility] Physical Therapy Referral        Primary Care Provider Office Phone # Fax #    Stephany Hannon, APRN [facility] [phone] [address]  [place] [address]        Equal Access to Services     MIRELLA DOMINGUEZ : Hadii joey ku hadlópezo Soomaali, waaxda luqadaha, qaybta kaalmada adeegyada, lisa shirleyn adewalter logan agustin feng. So [facility] [phone].    ATENCIÓN: Si habla español, tiene a awad disposición servicios gratuitos de asistencia lingüística. Llame al [phone].    We comply with applicable federal civil rights laws and [place] laws. We do not discriminate on the basis of race, color, national origin, age, disability, sex, sexual orientation, or gender identity.            Thank you!     Thank you for choosing [facility]  for your care. Our goal is always to provide you with excellent care. Hearing back from our patients is one way we can continue to improve our services. Please take a few minutes to complete the written survey that you may receive in the mail after your visit with us. Thank you!             Your Updated Medication List - Protect others around you: Learn how to safely use, store and throw away your medicines at www.disposemymeds.org.          This list is accurate as of: 10/6/17  9:36 AM.  Always use your most recent med list.                   Brand Name Dispense Instructions for use Diagnosis    cetirizine 10 MG tablet    zyrTEC     Take 10 mg by mouth daily as needed for allergies        citalopram 10 MG tablet    celeXA    90 tablet    TAKE 1 TABLET (10 MG) BY MOUTH DAILY    Situational depression       * MULTI COMPLETE PO           DAILY MULTIVITAMIN PO      Take 1 tablet by mouth daily.        * PRESERVISION/LUTEIN PO      Take 1 tablet by mouth daily.        * UNABLE TO FIND      MEDICATION NAME: [facility]  Liquid calcium        * UNABLE TO FIND      daily MEDICATION NAME: Osteo supplement for bones. Takes liquid  once daily        UNKNOWN TO PATIENT      Hormone cream from Gyno-uses twice per week        vitamin B complex with vitamin C Tabs tablet      Take 1 tablet by mouth daily        * Notice:  This list has 4 medication(s) that are the same as other medications prescribed for you. Read the directions carefully, and ask your doctor or other care provider to review them with you.

## 2017-10-06 NOTE — NURSING NOTE
"Aleksandra Michel's goals for this visit include:   Chief Complaint   Patient presents with     Consult     uterine prolapse     She requests these members of her care team be copied on today's visit information: YES - Erika Farnaz    Initial /61 (BP Location: Left arm, Patient Position: Chair, Cuff Size: Adult Regular)  Pulse 64 Estimated body mass index is 22.83 kg/(m^2) as calculated from the following:    Height as of 1/17/17: 1.6 m (5' 3\").    Weight as of 9/18/17: 58.5 kg (128 lb 14.4 oz).  BP completed using cuff size: regular        "

## 2017-10-16 ENCOUNTER — THERAPY VISIT (OUTPATIENT)
Dept: PHYSICAL THERAPY | Facility: CLINIC | Age: 67
End: 2017-10-16
Payer: MEDICARE

## 2017-10-16 DIAGNOSIS — M99.05 SOMATIC DYSFUNCTION OF PELVIC REGION: Primary | ICD-10-CM

## 2017-10-16 PROCEDURE — 90901 BIOFEEDBACK TRAIN ANY METH: CPT | Mod: GP | Performed by: PHYSICAL THERAPIST

## 2017-10-16 PROCEDURE — 97112 NEUROMUSCULAR REEDUCATION: CPT | Mod: GP | Performed by: PHYSICAL THERAPIST

## 2017-10-16 PROCEDURE — 97161 PT EVAL LOW COMPLEX 20 MIN: CPT | Mod: GP | Performed by: PHYSICAL THERAPIST

## 2017-10-16 PROCEDURE — 97110 THERAPEUTIC EXERCISES: CPT | Mod: GP | Performed by: PHYSICAL THERAPIST

## 2017-10-16 PROCEDURE — G8988 SELF CARE GOAL STATUS: HCPCS | Mod: GP | Performed by: PHYSICAL THERAPIST

## 2017-10-16 PROCEDURE — G8987 SELF CARE CURRENT STATUS: HCPCS | Mod: GP | Performed by: PHYSICAL THERAPIST

## 2017-10-16 NOTE — PROGRESS NOTES
Subjective:    Patient is a 67 year old female presenting with rehab pelvic hpi. The history is provided by the patient. No  was used.   Aleksandra Michel is a 67 year old female with a pelvic dysfunction condition.  Condition occurred with:  After surgery.  Condition occurred: for unknown reasons.  This is a chronic condition  Followed up with MD 10/6/17 due to ongoing issues. Had a Shirley vaginal procedure completed 12/16.  Has noticed more of a vaginal bulge since the procedure.  Also has had less sensation since the procedure.  Is currently using a vaginal estrogen cream and is using this every other night, does notice more discharge with this then before.  Also does notice more urine leaking after/during intercourse as in the past she has not had this issue in the past.  Does use lubrication for intercourse.  She does not notice any pain with intercourse but can have pain with initial penetration and with deeper penetration.  With have small droplets with leaking when she does leak.  Does wear a thin pantyliner daily.  On average with void every 1-3 hrs throughout the day..    Patient reports pain:  Vaginal.  Radiates to:  Other.  Pain is described as burning and is intermittent and reported as 5/10.   Pain is the same all the time.  Symptoms are exacerbated by intercourse and relieved by nothing.  Since onset symptoms are unchanged.        General health as reported by patient is good.  Pertinent medical history includes:  Osteoporosis and menopausal.  Medical allergies: yes (seassonal).  Other surgeries include:  None.  Current medications:  Other (hormone cream).  Current occupation is Housewife, .        Barriers include:  None as reported by the patient.    Red flags:  None as reported by the patient.                        Objective:    System                                 Pelvic Dysfunction Evaluation:    Bladder/Pelvic Problems:    Storage Problem:  Urgency    Dyspareunia:   "Grade 1      Flexibility:    Tightness present at:Adductors; Piriformis and Gluteals      Pelvic Clock Exam:    Ischiocavernosis pain:  -  Bulbocavernosis pain:  -  Transverse Perineal:  -  Levator ANI:  +  Perineal Body:  -      External Assessment:    Skin Condition:  Atrophic      Tissue Symmetry:  Normal    Muscle Contraction/Perineal Mobility:  Slight lift, no urogential triangle descent  Internal Assessment:  Internal assessment pelvic: vaginal prolapse present post-hysterectomy.  Sensory Exam:  Normal  Contraction/Grade:  Fair squeeze, definite lift (3)    Accessory Muscle use-Gluteals:  X  Accessory Muscle use-Adductors:  X    SEMG Biofeedback:    Equipment:  MR-25      Baseline EMG PM:  3.38uV    Peak pelvic muscle contraction:  2\" hold average 14.6uV/+8.27W-R    10\" hold average 9.03uV/+7.33W-R    EMG interpretation to fatigue:  3-5 seconds  Additional History:  Delivery History:  Vaginal delivery  Number of Pregnancies: 2  Number of Live Births: 2                       General     ROS    Assessment/Plan:      Patient is a 67 year old female with pelvic complaints.    Patient has the following significant findings with corresponding treatment plan.                Diagnosis 1:  Pelvic dysfunction  Pain -  manual therapy, self management, education and directional preference exercise  Decreased strength - therapeutic exercise and therapeutic activities  Impaired muscle performance - biofeedback and neuro re-education  Decreased function - therapeutic activities    Therapy Evaluation Codes:   1) History comprised of:   Personal factors that impact the plan of care:      None.    Comorbidity factors that impact the plan of care are:      None.     Medications impacting care: None.  2) Examination of Body Systems comprised of:   Body structures and functions that impact the plan of care:      Pelvis.   Activity limitations that impact the plan of care are:      [place] and Stress incontinence.  3) Clinical " presentation characteristics are:   Stable/Uncomplicated.  4) Decision-Making    Low complexity using standardized patient assessment instrument and/or measureable assessment of functional outcome.  Cumulative Therapy Evaluation is: Low complexity.    Previous and current functional limitations:  (See Goal Flow Sheet for this information)    Short term and Long term goals: (See Goal Flow Sheet for this information)     Communication ability:  Patient appears to be able to clearly communicate and understand verbal and written communication and follow directions correctly.  Treatment Explanation - The following has been discussed with the patient:   RX ordered/plan of care  Anticipated outcomes  Possible risks and side effects  This patient would benefit from PT intervention to resume normal activities.   Rehab potential is good.    Frequency:  1 X week, once daily  Duration:  for 12 weeks  Discharge Plan:  Achieve all LTG.  Independent in home treatment program.  Reach maximal therapeutic benefit.    Please refer to the daily flowsheet for treatment today, total treatment time and time spent performing 1:1 timed codes.

## 2017-10-16 NOTE — LETTER
DEPARTMENT OF HEALTH AND HUMAN SERVICES  CENTERS FOR MEDICARE & MEDICAID SERVICES    PLAN/UPDATED PLAN OF PROGRESS FOR OUTPATIENT REHABILITATION  PATIENTS NAME:  Aleksandra Michel   : 1950  PROVIDER NUMBER:    [id number]  [id number]  PROVIDER NAME: [facility] PHYSICAL THERAPY  MEDICAL RECORD NUMBER: [id number]   START OF CARE DATE:  SOC Date: 10/16/17   TYPE:  PT  PRIMARY/TREATMENT DIAGNOSIS: (Pertinent Medical Diagnosis)  Somatic dysfunction of pelvic region  VISITS FROM START OF CARE:  Rxs Used: 1     Subjective:  Patient is a 67 year old female presenting with rehab pelvic hpi. The history is provided by the patient. No  was used.   Aleksandra Michel is a 67 year old female with a pelvic dysfunction condition.  Condition occurred with:  After surgery.  Condition occurred: for unknown reasons.  This is a chronic condition  Followed up with MD 10/6/17 due to ongoing issues. Had a Shirley vaginal procedure completed .  Has noticed more of a vaginal bulge since the procedure.  Also has had less sensation since the procedure.  Is currently using a vaginal estrogen cream and is using this every other night, does notice more discharge with this then before.  Also does notice more urine leaking after/during intercourse as in the past she has not had this issue in the past.  Does use lubrication for intercourse.  She does not notice any pain with intercourse but can have pain with initial penetration and with deeper penetration.  With have small droplets with leaking when she does leak.  Does wear a thin pantyliner daily.  On average with void every 1-3 hrs throughout the day..    Patient reports pain:  Vaginal.  Radiates to:  Other.  Pain is described as burning and is intermittent and reported as 5/10.   Pain is the same all the time.  Symptoms are exacerbated by intercourse and relieved by nothing.  Since onset symptoms are unchanged.  General health as reported by  "patient is good.  Pertinent medical history includes:  Osteoporosis and menopausal.  Medical allergies: yes (seassonal).  Other surgeries include:  None.  Current medications:  Other (hormone cream).  Current occupation is Housewife, .        Barriers include:  None as reported by the patient.    Red flags:  None as reported by the patient.      Pelvic Dysfunction Evaluation:    Bladder/Pelvic Problems:    Storage Problem:  Urgency  Dyspareunia:  Grade 1    Flexibility:    Tightness present at:Adductors; Piriformis and Gluteals  Pelvic Clock Exam:    Ischiocavernosis pain:  -  Bulbocavernosis pain:  -  Transverse Perineal:  -  Levator ANI:  +  Perineal Body:  -  External Assessment:    Skin Condition:  Atrophic  Tissue Symmetry:  Normal  Muscle Contraction/Perineal Mobility:  Slight lift, no urogential triangle descent  Internal Assessment:  Internal assessment pelvic: vaginal prolapse present post-hysterectomy.  Sensory Exam:  Normal  Contraction/Grade:  Fair squeeze, definite lift (3)  Accessory Muscle use-Gluteals:  X  Accessory Muscle use-Adductors:  X  SEMG Biofeedback:    Equipment:  [employer]  Baseline EMG PM:  3.38uV      PATIENTS NAME:  Aleksandra Michel   : 1950  Peak pelvic muscle contraction:  2\" hold average 14.6uV/+8.27W-R    10\" hold average 9.03uV/+7.33W-R  EMG interpretation to fatigue:  3-5 seconds  Additional History:  Delivery History:  Vaginal delivery  Number of Pregnancies: 2  Number of Live Births: 2    Assessment/Plan:    Patient is a 67 year old female with pelvic complaints.    Patient has the following significant findings with corresponding treatment plan.                Diagnosis 1:  Pelvic dysfunction  Pain -  manual therapy, self management, education and directional preference exercise  Decreased strength - therapeutic exercise and therapeutic activities  Impaired muscle performance - biofeedback and neuro re-education  Decreased function - therapeutic activities    Therapy " Evaluation Codes:   1) History comprised of:   Personal factors that impact the plan of care:      None.    Comorbidity factors that impact the plan of care are:      None.     Medications impacting care: None.  2) Examination of Body Systems comprised of:   Body structures and functions that impact the plan of care:      Pelvis.   Activity limitations that impact the plan of care are:      [place] and Stress incontinence.  3) Clinical presentation characteristics are:   Stable/Uncomplicated.  4) Decision-Making    Low complexity using standardized patient assessment instrument and/or measureable assessment of functional outcome.  Cumulative Therapy Evaluation is: Low complexity.  Previous and current functional limitations:  (See Goal Flow Sheet for this information)    Short term and Long term goals: (See Goal Flow Sheet for this information)   Communication ability:  Patient appears to be able to clearly communicate and understand verbal and written communication and follow directions correctly.  Treatment Explanation - The following has been discussed with the patient:     RX ordered/plan of care  Anticipated outcomes  Possible risks and side effects  This patient would benefit from PT intervention to resume normal activities.   Rehab potential is good.    Frequency:  1 X week, once daily  Duration:  for 12 weeks  Discharge Plan:  Achieve all LTG.  Independent in home treatment program.  Reach maximal therapeutic benefit.      Caregiver Signature/Credentials _____________________________ Date ________      Treating Provider: Patricia Velazquez DPT   I have reviewed and certified the need for these services and plan of treatment while under my care.      PHYSICIAN'S SIGNATURE:   _________________________________________  Date___________                                   Pricila Liang MD  Certification period:  Beginning of Cert date period: 10/16/17 to  End of Cert period date: 01/13/18     Functional Level  "Progress Report: Please see attached \"Goal Flow sheet for Functional level.\"  ____X____ Continue Services or       ________ DC Services                Service dates: From  SOC Date: 10/16/17 date to present                         "

## 2017-10-16 NOTE — MR AVS SNAPSHOT
After Visit Summary   10/16/2017    Aleksandra Michel    MRN: [id number]           Patient Information     Date Of Birth          1950        Visit Information        Provider Department      10/16/2017 9:40 AM Berends, Patricia, PT [facility]        Today's Diagnoses     Somatic dysfunction of pelvic region    -  1       Follow-ups after your visit        Your next 10 appointments already scheduled     Oct 23, 2017 11:30 AM CDT   MICHAEL For Women Only with Patricia Bermelina, PT   [facility] ([facility]  )    [address]  [facility] [address]   [phone]            Oct 30, 2017  3:30 PM CDT   MICHAEL For Women Only with Patricia Bermelina, PT   [facility] ([facility]  )    [address]  [facility] [address]   [phone]            Nov 06, 2017 10:20 AM CST   MICHAEL For Women Only with Patricia Berends, PT   [facility] ([facility]  )    [address]  [facility] [address]   [phone]            Nov 14, 2017  9:40 AM CST   MICHAEL For Women Only with Patricia Berends, PT   [facility]    [address]  [facility] [address]   [phone]            Nov 20, 2017 10:20 AM CST   MICHAEL For Women Only with Patricia Berends, PT   [facility] ([facility]  )    [address]  [facility] [address]   [phone]            Nov 27, 2017 10:20 AM CST   MICHAEL For Women Only with Patricia Berends, PT   [facility] ([facility]  )    [address]  [facility] [address]   [phone]            Feb 13, 2018  3:00 PM CST   Return Visit with Pricila Liang MD   [facility] ([facility]    [address]  [facility] [address] [phone]  "             Who to contact     If you have questions or need follow up information about today's clinic visit or your schedule please contact [facility] PHYSICAL THERAPY directly at [phone].  Normal or non-critical lab and imaging results will be communicated to you by MyChart, letter or phone within 4 business days after the clinic has received the results. If you do not hear from us within 7 days, please contact the clinic through MyChart or phone. If you have a critical or abnormal lab result, we will notify you by phone as soon as possible.  Submit refill requests through Vanu Coverage or call your pharmacy and they will forward the refill request to us. Please allow 3 business days for your refill to be completed.          Additional Information About Your Visit        [employer] Information     Vanu Coverage lets you send messages to your doctor, view your test results, renew your prescriptions, schedule appointments and more. To sign up, go to [email or web address] . Click on \"Log in\" on the left side of the screen, which will take you to the Welcome page. Then click on \"Sign up Now\" on the right side of the page.     You will be asked to enter the access code listed below, as well as some personal information. Please follow the directions to create your username and password.     Your access code is: [email or web address]  Expires: 2017 12:30 PM     Your access code will  in 90 days. If you need help or a new code, please call your [place] clinic or [phone].        Care EveryWhere ID     This is your Care EveryWhere ID. This could be used by other organizations to access your [place] medical records  [id number]         Blood Pressure from Last 3 Encounters:   10/06/17 107/61   17 104/65   17 100/60    Weight from Last 3 Encounters:   17 58.5 kg (128 lb 14.4 oz)   17 54.2 kg (119 lb 6.4 oz)   16 55.5 kg (122 lb 6.4 oz)              We Performed the " Following     BIOFEEDBACK TRAINING     HC PT EVAL, LOW COMPLEXITY     MICHAEL CERT REPORT     MICHAEL INITIAL EVAL REPORT     NEUROMUSCULAR RE-EDUCATION     THERAPEUTIC EXERCISES        Primary Care Provider Office Phone # Fax #    Stephany Hannon, VARSHA [facility] [phone] [address]  [place] [address]        Equal Access to Services     MIRELLA DOMINGUEZ : Hadii aad ku hadasho Soomaali, waaxda luqadaha, qaybta kaalmada adeegyada, waxay idiin hayaan adeeg kharash la'aan . So [facility] [phone].    ATENCIÓN: Si habla español, tiene a awad disposición servicios gratuitos de asistencia lingüística. Llame al [phone].    We comply with applicable federal civil rights laws and [place] laws. We do not discriminate on the basis of race, color, national origin, age, disability, sex, sexual orientation, or gender identity.            Thank you!     Thank you for choosing [facility] PHYSICAL THERAPY  for your care. Our goal is always to provide you with excellent care. Hearing back from our patients is one way we can continue to improve our services. Please take a few minutes to complete the written survey that you may receive in the mail after your visit with us. Thank you!             Your Updated Medication List - Protect others around you: Learn how to safely use, store and throw away your medicines at www.disposemymeds.org.          This list is accurate as of: 10/16/17  3:29 PM.  Always use your most recent med list.                   Brand Name Dispense Instructions for use Diagnosis    cetirizine 10 MG tablet    zyrTEC     Take 10 mg by mouth daily as needed for allergies        citalopram 10 MG tablet    celeXA    90 tablet    TAKE 1 TABLET (10 MG) BY MOUTH DAILY    Situational depression       * MULTI COMPLETE PO           DAILY MULTIVITAMIN PO      Take 1 tablet by mouth daily.        * PRESERVISION/LUTEIN PO      Take 1 tablet by mouth daily.        * UNABLE TO FIND       MEDICATION NAME: Beyond Osteo Young Gevity  Liquid calcium        * UNABLE TO FIND      daily MEDICATION NAME: Osteo supplement for bones. Takes liquid once daily        UNKNOWN TO PATIENT      Hormone cream from Gyno-uses twice per week        vitamin B complex with vitamin C Tabs tablet      Take 1 tablet by mouth daily        * Notice:  This list has 4 medication(s) that are the same as other medications prescribed for you. Read the directions carefully, and ask your doctor or other care provider to review them with you.

## 2017-10-23 ENCOUNTER — THERAPY VISIT (OUTPATIENT)
Dept: PHYSICAL THERAPY | Facility: CLINIC | Age: 67
End: 2017-10-23
Payer: MEDICARE

## 2017-10-23 DIAGNOSIS — M99.05 SOMATIC DYSFUNCTION OF PELVIC REGION: ICD-10-CM

## 2017-10-23 PROCEDURE — 97530 THERAPEUTIC ACTIVITIES: CPT | Mod: GP | Performed by: PHYSICAL THERAPIST

## 2017-10-23 PROCEDURE — 97140 MANUAL THERAPY 1/> REGIONS: CPT | Mod: GP | Performed by: PHYSICAL THERAPIST

## 2017-10-23 PROCEDURE — 97110 THERAPEUTIC EXERCISES: CPT | Mod: GP | Performed by: PHYSICAL THERAPIST

## 2017-11-14 ENCOUNTER — THERAPY VISIT (OUTPATIENT)
Dept: PHYSICAL THERAPY | Facility: CLINIC | Age: 67
End: 2017-11-14
Payer: MEDICARE

## 2017-11-14 DIAGNOSIS — M99.05 SOMATIC DYSFUNCTION OF PELVIC REGION: ICD-10-CM

## 2017-11-14 PROCEDURE — 97530 THERAPEUTIC ACTIVITIES: CPT | Mod: GP | Performed by: PHYSICAL THERAPIST

## 2017-11-14 PROCEDURE — 97110 THERAPEUTIC EXERCISES: CPT | Mod: GP | Performed by: PHYSICAL THERAPIST

## 2017-11-14 PROCEDURE — 97140 MANUAL THERAPY 1/> REGIONS: CPT | Mod: GP | Performed by: PHYSICAL THERAPIST

## 2017-11-20 ENCOUNTER — THERAPY VISIT (OUTPATIENT)
Dept: PHYSICAL THERAPY | Facility: CLINIC | Age: 67
End: 2017-11-20
Payer: MEDICARE

## 2017-11-20 DIAGNOSIS — M99.05 SOMATIC DYSFUNCTION OF PELVIC REGION: ICD-10-CM

## 2017-11-20 PROCEDURE — 97140 MANUAL THERAPY 1/> REGIONS: CPT | Mod: GP | Performed by: PHYSICAL THERAPIST

## 2017-11-20 PROCEDURE — 97110 THERAPEUTIC EXERCISES: CPT | Mod: GP | Performed by: PHYSICAL THERAPIST

## 2017-11-27 ENCOUNTER — THERAPY VISIT (OUTPATIENT)
Dept: PHYSICAL THERAPY | Facility: CLINIC | Age: 67
End: 2017-11-27
Payer: MEDICARE

## 2017-11-27 DIAGNOSIS — M99.05 SOMATIC DYSFUNCTION OF PELVIC REGION: ICD-10-CM

## 2017-11-27 PROCEDURE — 97140 MANUAL THERAPY 1/> REGIONS: CPT | Mod: GP | Performed by: PHYSICAL THERAPIST

## 2017-11-27 PROCEDURE — 97110 THERAPEUTIC EXERCISES: CPT | Mod: GP | Performed by: PHYSICAL THERAPIST

## 2017-12-04 ENCOUNTER — THERAPY VISIT (OUTPATIENT)
Dept: PHYSICAL THERAPY | Facility: CLINIC | Age: 67
End: 2017-12-04
Payer: MEDICARE

## 2017-12-04 DIAGNOSIS — M99.05 SOMATIC DYSFUNCTION OF PELVIC REGION: ICD-10-CM

## 2017-12-04 PROCEDURE — 97140 MANUAL THERAPY 1/> REGIONS: CPT | Mod: GP | Performed by: PHYSICAL THERAPIST

## 2017-12-04 PROCEDURE — 97110 THERAPEUTIC EXERCISES: CPT | Mod: GP | Performed by: PHYSICAL THERAPIST

## 2017-12-11 ENCOUNTER — THERAPY VISIT (OUTPATIENT)
Dept: PHYSICAL THERAPY | Facility: CLINIC | Age: 67
End: 2017-12-11
Payer: MEDICARE

## 2017-12-11 DIAGNOSIS — M99.05 SOMATIC DYSFUNCTION OF PELVIC REGION: ICD-10-CM

## 2017-12-11 PROCEDURE — 97110 THERAPEUTIC EXERCISES: CPT | Mod: GP | Performed by: PHYSICAL THERAPIST

## 2017-12-11 PROCEDURE — 97140 MANUAL THERAPY 1/> REGIONS: CPT | Mod: GP | Performed by: PHYSICAL THERAPIST

## 2017-12-11 NOTE — MR AVS SNAPSHOT
After Visit Summary   12/11/2017    Aleksandra Michel    MRN: [id number]           Patient Information     Date Of Birth          1950        Visit Information        Provider Department      12/11/2017 11:30 AM Patricia Velazquez, PT [facility]        Today's Diagnoses     Somatic dysfunction of pelvic region           Follow-ups after your visit        Your next 10 appointments already scheduled     Dec 18, 2017 11:30 AM CST   MICHAEL For Women Only with Patricia Velazquez, PT   [facility] ([facility]  )    [address]  [facility] [address]   [phone]            Dec 26, 2017 11:30 AM CST   MICHAEL For Women Only with Patricia Velazquez, PT   [facility] ([facility]  )    [address] [facility] [address]   [phone]            Feb 13, 2018  3:00 PM CST   Return Visit with Pricila Liang MD   [facility] ([facility])    [address] [place] [address] [address] [facility] [address]   [phone]              Who to contact     If you have questions or need follow up information about today's clinic visit or your schedule please contact [facility] PH[facility]ICAL THERAPY directly at [phone].  Normal or non-critical lab and imaging results will be communicated to you by AppChinahart, letter or phone within 4 business days after the clinic has received the results. If you do not hear from us within 7 days, please contact the clinic through AppChinahart or phone. If you have a critical or abnormal lab result, we will notify you by phone as soon as possible.  Submit refill requests through [employer] or call your pharmacy and they will forward the refill request to us. Please allow 3 business days for your refill to be completed.          Additional Information About Your Visit        [employer] Information     [employer] lets you send messages to  "your doctor, view your test results, renew your prescriptions, schedule appointments and more. To sign up, go to [email or web address] . Click on \"Log in\" on the left side of the screen, which will take you to the Welcome page. Then click on \"Sign up Now\" on the right side of the page.     You will be asked to enter the access code listed below, as well as some personal information. Please follow the directions to create your username and password.     Your access code is: [email or web address]  Expires: 2017 11:30 AM     Your access code will  in 90 days. If you need help or a new code, please call your [place] clinic or [phone].        Care EveryWhere ID     This is your Care EveryWhere ID. This could be used by other organizations to access your [place] medical records  [id number]         Blood Pressure from Last 3 Encounters:   10/06/17 107/61   17 104/65   17 100/60    Weight from Last 3 Encounters:   17 58.5 kg (128 lb 14.4 oz)   17 54.2 kg (119 lb 6.4 oz)   16 55.5 kg (122 lb 6.4 oz)              Today, you had the following     No orders found for display       Primary Care Provider Office Phone # Fax #    Stephany Johnson Hannon, VARSHA [facility] [phone]       [address]  [place] [address]        Equal Access to Services     DONALD DOMINGUEZ : Hadii aad ku hadasho Soomaali, waaxda luqadaha, qaybta kaalmada adeegyada, waxay vladimir velez . So [facility] [phone].    ATENCIÓN: Si anselmola shawn, tiene a awad disposición servicios gratuitos de asistencia lingüística. Llame al [phone].    We comply with applicable federal civil rights laws and [place] laws. We do not discriminate on the basis of race, color, national origin, age, disability, sex, sexual orientation, or gender identity.            Thank you!     Thank you for choosing [facility] PHYSICAL THERAPY  for your care. Our goal is always to provide " you with excellent care. Hearing back from our patients is one way we can continue to improve our services. Please take a few minutes to complete the written survey that you may receive in the mail after your visit with us. Thank you!             Your Updated Medication List - Protect others around you: Learn how to safely use, store and throw away your medicines at www.disposemymeds.org.          This list is accurate as of: 12/11/17 12:12 PM.  Always use your most recent med list.                   Brand Name Dispense Instructions for use Diagnosis    cetirizine 10 MG tablet    zyrTEC     Take 10 mg by mouth daily as needed for allergies        citalopram 10 MG tablet    celeXA    90 tablet    TAKE 1 TABLET (10 MG) BY MOUTH DAILY    Situational depression       * MULTI COMPLETE PO           DAILY MULTIVITAMIN PO      Take 1 tablet by mouth daily.        * PRESERVISION/LUTEIN PO      Take 1 tablet by mouth daily.        * UNABLE TO FIND      MEDICATION NAME: Beyond Osteo Young Gevity  Liquid calcium        * UNABLE TO FIND      daily MEDICATION NAME: Osteo supplement for bones. Takes liquid once daily        UNKNOWN TO PATIENT      Hormone cream from Gyno-uses twice per week        vitamin B complex with vitamin C Tabs tablet      Take 1 tablet by mouth daily        * Notice:  This list has 4 medication(s) that are the same as other medications prescribed for you. Read the directions carefully, and ask your doctor or other care provider to review them with you.

## 2017-12-18 ENCOUNTER — THERAPY VISIT (OUTPATIENT)
Dept: PHYSICAL THERAPY | Facility: CLINIC | Age: 67
End: 2017-12-18
Payer: MEDICARE

## 2017-12-18 DIAGNOSIS — M99.05 SOMATIC DYSFUNCTION OF PELVIC REGION: ICD-10-CM

## 2017-12-18 PROCEDURE — 97140 MANUAL THERAPY 1/> REGIONS: CPT | Mod: GP | Performed by: PHYSICAL THERAPIST

## 2017-12-18 PROCEDURE — 97110 THERAPEUTIC EXERCISES: CPT | Mod: GP | Performed by: PHYSICAL THERAPIST

## 2018-01-04 ENCOUNTER — THERAPY VISIT (OUTPATIENT)
Dept: PHYSICAL THERAPY | Facility: CLINIC | Age: 68
End: 2018-01-04
Payer: MEDICARE

## 2018-01-04 DIAGNOSIS — M99.05 SOMATIC DYSFUNCTION OF PELVIC REGION: ICD-10-CM

## 2018-01-04 PROCEDURE — 97110 THERAPEUTIC EXERCISES: CPT | Mod: GP | Performed by: PHYSICAL THERAPIST

## 2018-01-04 PROCEDURE — 97140 MANUAL THERAPY 1/> REGIONS: CPT | Mod: GP | Performed by: PHYSICAL THERAPIST

## 2018-01-29 ENCOUNTER — THERAPY VISIT (OUTPATIENT)
Dept: PHYSICAL THERAPY | Facility: CLINIC | Age: 68
End: 2018-01-29
Payer: MEDICARE

## 2018-01-29 DIAGNOSIS — R32 URINARY INCONTINENCE: Primary | ICD-10-CM

## 2018-01-29 DIAGNOSIS — M99.05 SOMATIC DYSFUNCTION OF PELVIC REGION: ICD-10-CM

## 2018-01-29 PROCEDURE — 97140 MANUAL THERAPY 1/> REGIONS: CPT | Mod: GP | Performed by: PHYSICAL THERAPIST

## 2018-01-29 PROCEDURE — G8988 SELF CARE GOAL STATUS: HCPCS | Mod: GP | Performed by: PHYSICAL THERAPIST

## 2018-01-29 PROCEDURE — G0283 ELEC STIM OTHER THAN WOUND: HCPCS | Mod: GP | Performed by: PHYSICAL THERAPIST

## 2018-01-29 PROCEDURE — G8989 SELF CARE D/C STATUS: HCPCS | Mod: GP | Performed by: PHYSICAL THERAPIST

## 2018-01-29 PROCEDURE — 97110 THERAPEUTIC EXERCISES: CPT | Mod: GP | Performed by: PHYSICAL THERAPIST

## 2018-01-29 NOTE — LETTER
[facility] PHYSICAL THERAPY  [address]  [place] [address]  [phone]    2018    Re: Aleksandra Michel   :   1950  MRN:  [id number]   REFERRING PHYSICIAN:   Pricila Liang MD    [facility] PHYSICAL THERAPY  Date of Initial Evaluation:  10/16/17  Visits:  Rxs Used: 10  Reason for Referral:  Somatic dysfunction of pelvic region    PROGRESS  REPORT  Progress reporting period is from 10/16/18 to 18.     SUBJECTIVE  Subjective: Patient reports overall she is feeling about the same.  Has been using the M dilator.  We discussed  trialing estim today.  Patient would like to like to trial home estim unit  and would like to turn in the paperwork related to this.       Current Pain level: 0/10   Initial Pain level: 4/10   Changes in function: Yes, see goal flow sheet for change in function   Adverse reactions: None;     The subjective and objective information are from the last SOAP note on this patient.    OBJECTIVE  Objective: There was depth loss with only able to enter to MCP joint Increased tone OI/LA R>L this was less abfter complete estim       ASSESSMENT/PLAN  Updated problem list and treatment plan:     Diagnosis 1:  Pelvic dysfunction, Urinary incontinence  Pain -  electric stimulation, manual therapy, self management and education  Decreased ROM/flexibility - manual therapy and therapeutic exercise  Decreased strength - therapeutic exercise and therapeutic activities  Impaired muscle performance - biofeedback, electric stimulation and neuro re-education  Decreased function - therapeutic activities    STG/LTGs have been met or progress has been made towards goals:  Yes (See Goal flow sheet completed today.)    Assessment of Progress: The patient's condition has potential to improve.    Self Management Plans:  Patient has been instructed in a home treatment program.    Patient  has been instructed in self management of  symptoms.    I have re-evaluated this patient and find that the nature, scope, duration and intensity of the therapy is appropriate for the medical condition of the patient.    Aleksandra continues to require the following intervention to meet STG and LTG's: PT    Recommendations:  Aleksandra currently has urinary incontinence.  Aleksandra has failed and continues to have ongoing issues and has FAILED PT for over a 4 week period that was prescribed by the physician. Aleksandra is cognitively intact.  Aleksandra would benefit from an electrical stimulation unit for home use.      This patient would benefit from continued therapy.     Frequency:  2 X a month, once daily  Duration:  for 1 months    Thank you for your referral.    INQUIRIES  Therapist: Patricia Velazquez DPT, Cert. MDT  [facility] PHYSICAL THERAPY  [address]  [facility] [address]  Phone: [phone]  Fax: [phone]

## 2018-01-29 NOTE — MR AVS SNAPSHOT
"              After Visit Summary   1/29/2018    Aleksandra Michel    MRN: [id number]           Patient Information     Date Of Birth          1950        Visit Information        Provider Department      1/29/2018 11:30 AM Patricia Velazquez, PT [facility]        Today's Diagnoses     Somatic dysfunction of pelvic region           Follow-ups after your visit        Your next 10 appointments already scheduled     Feb 12, 2018 11:30 AM CST   MICHAEL For Women Only with Patricia Velazquez, PT   [facility] ([facility]  )    [address] [place], [place] [address]e [facility] [address] [phone]            Feb 13, 2018  3:00 PM CST   Return Visit with Pricila Liang MD   [facility] ([facility])    [address] [place] [address] [phone]              Who to contact     If you have questions or need follow up information about today's clinic visit or your schedule please contact [facility] PHYSICAL THERAPY directly at [phone].  Normal or non-critical lab and imaging results will be communicated to you by MyChart, letter or phone within 4 business days after the clinic has received the results. If you do not hear from us within 7 days, please contact the clinic through ShoutOmatichart or phone. If you have a critical or abnormal lab result, we will notify you by phone as soon as possible.  Submit refill requests through OneStopWeb or call your pharmacy and they will forward the refill request to us. Please allow 3 business days for your refill to be completed.          Additional Information About Your Visit        MyChart Information     OneStopWeb lets you send messages to your doctor, view your test results, renew your prescriptions, schedule appointments and more. To sign up, go to [email or web address] . Click on \"Log in\" on the left side of the screen, which will take you to the " "Welcome page. Then click on \"Sign up Now\" on the right side of the page.     You will be asked to enter the access code listed below, as well as some personal information. Please follow the directions to create your username and password.     Your access code is: [email or web address]  Expires: 3/18/2018  3:02 PM     Your access code will  in 90 days. If you need help or a new code, please call your [place] clinic or [phone].        Care EveryWhere ID     This is your Care EveryWhere ID. This could be used by other organizations to access your [place] medical records  [id number]         Blood Pressure from Last 3 Encounters:   10/06/17 107/61   17 104/65   17 100/60    Weight from Last 3 Encounters:   17 58.5 kg (128 lb 14.4 oz)   17 54.2 kg (119 lb 6.4 oz)   16 55.5 kg (122 lb 6.4 oz)              We Performed the Following     ELECTRIC STIMULATION THERAPY     MANUAL THER TECH,1+REGIONS,EA 15 MIN     THERAPEUTIC EXERCISES        Primary Care Provider Office Phone # Fax #    Stephany Elizabeth Hannon, APRN [facility] [phone]       [address]  [place] [address]        Equal Access to Services     MIRELLA DOMINGUEZ : Hadii aad ku hadasho Soomaali, waaxda luqadaha, qaybta kaalmada adeegyada, lisa velez . So [facility] [phone].    ATENCIÓN: Si habla español, tiene a awad disposición servicios gratuitos de asistencia lingüística. Llame al [phone].    We comply with applicable federal civil rights laws and [place] laws. We do not discriminate on the basis of race, color, national origin, age, disability, sex, sexual orientation, or gender identity.            Thank you!     Thank you for choosing [facility] PHYSICAL THERAPY  for your care. Our goal is always to provide you with excellent care. Hearing back from our patients is one way we can continue to improve our services. Please take a few minutes to complete the " written survey that you may receive in the mail after your visit with us. Thank you!             Your Updated Medication List - Protect others around you: Learn how to safely use, store and throw away your medicines at www.disposemymeds.org.          This list is accurate as of 1/29/18 12:31 PM.  Always use your most recent med list.                   Brand Name Dispense Instructions for use Diagnosis    cetirizine 10 MG tablet    zyrTEC     Take 10 mg by mouth daily as needed for allergies        citalopram 10 MG tablet    celeXA    90 tablet    TAKE 1 TABLET (10 MG) BY MOUTH DAILY    Situational depression       * MULTI COMPLETE PO           DAILY MULTIVITAMIN PO      Take 1 tablet by mouth daily.        * PRESERVISION/LUTEIN PO      Take 1 tablet by mouth daily.        * UNABLE TO FIND      MEDICATION NAME: Beyond Osteo Young Gevity  Liquid calcium        * UNABLE TO FIND      daily MEDICATION NAME: Osteo supplement for bones. Takes liquid once daily        UNKNOWN TO PATIENT      Hormone cream from Gyno-uses twice per week        vitamin B complex with vitamin C Tabs tablet      Take 1 tablet by mouth daily        * Notice:  This list has 4 medication(s) that are the same as other medications prescribed for you. Read the directions carefully, and ask your doctor or other care provider to review them with you.

## 2018-01-29 NOTE — LETTER
DEPARTMENT OF HEALTH AND HUMAN SERVICES  CENTERS FOR MEDICARE & MEDICAID SERVICES    PLAN/UPDATED PLAN OF PROGRESS FOR OUTPATIENT REHABILITATION    PATIENTS NAME:  Aleksandra Michel     : 1950    PROVIDER NUMBER:    [id number]    HICN:  [id number]    PROVIDER NAME: [facility]    MEDICAL RECORD NUMBER: [id number]     START OF CARE DATE:  SOC Date: 10/16/17   TYPE:  PT    PRIMARY/TREATMENT DIAGNOSIS: (Pertinent Medical Diagnosis)     Somatic dysfunction of pelvic region  Urinary incontinence    VISITS FROM START OF CARE:  Rxs Used: 10     PROGRESS  REPORT  Progress reporting period is from 10/16/18 to 18.     SUBJECTIVE  Subjective: Patient reports overall she is feeling about the same.  Has been using the M dilator.  We discussed  trialing estim today.  Patient would like to like to trial home estim unit  and would like to turn in the paperwork related to this.       Current Pain level: 0/10   Initial Pain level: 4/10   Changes in function: Yes, see goal flow sheet for change in function   Adverse reactions: None;      The subjective and objective information are from the last SOAP note on this patient.    OBJECTIVE  Objective: There was depth loss with only able to enter to MCP joint Increased tone OI/LA R>L this was less abfter complete estim       ASSESSMENT/PLAN  Updated problem list and treatment plan: Diagnosis 1:  Pelvic dysfunction, Urinary incontinence  Pain -  electric stimulation, manual therapy, self management and education  Decreased ROM/flexibility - manual therapy and therapeutic exercise  Decreased strength - therapeutic exercise and therapeutic activities    Impaired muscle performance - biofeedback, electric stimulation and neuro re-education  Decreased function - therapeutic activities    STG/LTGs have been met or progress has been made towards goals:  Yes (See Goal flow sheet completed today.)    Assessment of Progress: The patient's condition has  "potential to improve.  Self Management Plans:  Patient has been instructed in a home treatment program.    Patient  has been instructed in self management of symptoms.    I have re-evaluated this patient and find that the nature, scope, duration and intensity of the therapy is appropriate for the medical condition of the patient.    Aleksandra continues to require the following intervention to meet STG and LTG's: PT    Recommendations:  Aleksandra currently has urinary incontinence.  Aleksandra has failed and continues to have ongoing issues and has FAILED PT for over a 4 week period that was prescribed by the physician. Aleksandra is cognitively intact.  Aleksandra would benefit from an electrical stimulation unit for home use.      This patient would benefit from continued therapy.     Frequency:  2 X a month, once daily  Duration:  for 1 months    Please refer to the daily flowsheet for treatment today, total treatment time and time spent performing 1:1 timed codes.    Caregiver Signature/Credentials _____________________________ Date ________       Treating Provider: Patricia Velazquez DPT     I have reviewed and certified the need for these services and plan of treatment while under my care.      PHYSICIAN'S SIGNATURE:   _____________________________________  Date___________               Pricila See Yg Liang MD    Certification period:  Beginning of Cert date period: 01/14/18 to  End of Cert period date: 04/13/18     Functional Level Progress Report: Please see attached \"Goal Flow sheet for Functional level.\"    ____X____ Continue Services or       ________ DC Services                Service dates: From  SOC Date: 10/16/17 date to present                         "

## 2018-01-31 PROBLEM — R32 URINARY INCONTINENCE: Status: ACTIVE | Noted: 2018-01-31

## 2018-01-31 NOTE — PROGRESS NOTES
Subjective:  HPI                    Objective:  System    Physical Exam    General     ROS    Assessment/Plan:    PROGRESS  REPORT    Progress reporting period is from 10/16/18 to 1/29/18.     SUBJECTIVE  Subjective: Patient reports overall she is feeling about the same.  Has been using the M dilator.  We discussed  trialing estim today.  Patient would like to like to trial home estim unit  and would like to turn in the paperwork related to this.     Current Pain level: 0/10   Initial Pain level: 4/10   Changes in function: Yes, see goal flow sheet for change in function   Adverse reactions: None;   ,     The subjective and objective information are from the last SOAP note on this patient.    OBJECTIVE  Objective: There was depth loss with only able to enter to MCP joint Increased tone OI/LA R>L this was less abfter complete estim       ASSESSMENT/PLAN  Updated problem list and treatment plan: Diagnosis 1:  Pelvic dysfunction, Urinary incontinence  Pain -  electric stimulation, manual therapy, self management and education  Decreased ROM/flexibility - manual therapy and therapeutic exercise  Decreased strength - therapeutic exercise and therapeutic activities  Impaired muscle performance - biofeedback, electric stimulation and neuro re-education  Decreased function - therapeutic activities  STG/LTGs have been met or progress has been made towards goals:  Yes (See Goal flow sheet completed today.)  Assessment of Progress: The patient's condition has potential to improve.  Self Management Plans:  Patient has been instructed in a home treatment program.  Patient  has been instructed in self management of symptoms.  I have re-evaluated this patient and find that the nature, scope, duration and intensity of the therapy is appropriate for the medical condition of the patient.  Aleksandra continues to require the following intervention to meet STG and LTG's: PT      Recommendations:    Aleksandra currently has urinary incontinence.   Aleksandra has failed and continues to have ongoing issues and has FAILED PT for over a 4 week period that was prescribed by the physician. Aleksandra is cognitively intact.  Aleksandra would benefit from an electrical stimulation unit for home use.    This patient would benefit from continued therapy.       Frequency:  2 X a month, once daily  Duration:  for 1 months        Please refer to the daily flowsheet for treatment today, total treatment time and time spent performing 1:1 timed codes.

## 2018-02-12 ENCOUNTER — THERAPY VISIT (OUTPATIENT)
Dept: PHYSICAL THERAPY | Facility: CLINIC | Age: 68
End: 2018-02-12
Payer: MEDICARE

## 2018-02-12 DIAGNOSIS — M99.05 SOMATIC DYSFUNCTION OF PELVIC REGION: ICD-10-CM

## 2018-02-12 DIAGNOSIS — R32 URINARY INCONTINENCE: ICD-10-CM

## 2018-02-12 PROCEDURE — 90901 BIOFEEDBACK TRAIN ANY METH: CPT | Mod: GP | Performed by: PHYSICAL THERAPIST

## 2018-02-12 PROCEDURE — 97140 MANUAL THERAPY 1/> REGIONS: CPT | Mod: GP | Performed by: PHYSICAL THERAPIST

## 2018-02-12 PROCEDURE — G8988 SELF CARE GOAL STATUS: HCPCS | Mod: GP | Performed by: PHYSICAL THERAPIST

## 2018-02-12 PROCEDURE — G8989 SELF CARE D/C STATUS: HCPCS | Mod: GP | Performed by: PHYSICAL THERAPIST

## 2018-02-12 PROCEDURE — 97110 THERAPEUTIC EXERCISES: CPT | Mod: GP | Performed by: PHYSICAL THERAPIST

## 2018-02-12 NOTE — PROGRESS NOTES
"Subjective:  HPI                    Objective:  System    Physical Exam    General     ROS    Assessment/Plan:    DISCHARGE REPORT    Progress reporting period is from 1/29/18 to 2/12/18.       SUBJECTIVE  Subjective changes noted by patient:  Patient reports she felt the e-stim was helpful and would hopefully like a home unit.  We are waiting for MD notes and insurance approval.  Home internal stretching is going ok and is completing this 3x/wk.  Is able to insert the M dilator with minimal discomfort.  Does feel like the vaginal stretching has been helpful.  Does feel like you are possibly intermittently leaking.  Feels like she has more discharge with coughing/sneezing but does not actually feel the sensation of leaking.  Will discuss with MD current ongoing issues at next MD appointment.    Current pain level is 0/10.     Previous pain level was 4/10.   Changes in function:  Yes (See Goal flowsheet attached for changes in current functional level)  Adverse reaction to treatment or activity: None    OBJECTIVE  Changes noted in objective findings:  Yes,     Internal:There is a depth loss with only able to enter to the depth of the MCP joint.   There is increased tone present in OI/LA musculature R>L     Biofeedback   Baseline tone 3.93uV   2\" average hold 19.8uV with +12.7W-R   10\" average hold 15.4uV with +11.9W-R  Baseline resting tone after treatment 1.64uV    ASSESSMENT/PLAN  Updated problem list and treatment plan: Diagnosis 1:  Pelvic dysfunction  Decreased ROM/flexibility - home program  Impaired muscle performance - home program  Decreased function - home program  STG/LTGs have been met or progress has been made towards goals:  Yes (See Goal flow sheet completed today.)  Assessment of Progress: The patient's progress has plateaued.  Self Management Plans:  Patient is independent in a home treatment program.  Patient is independent in self management of symptoms.  I have re-evaluated this patient and2 find " that the nature, scope, duration and intensity of the therapy is appropriate for the medical condition of the patient.  Aleksandra continues to require the following intervention to meet STG and LTG's:  PT intervention is no longer required to meet STG/LTG.    Recommendations:  This patient would benefit from further evaluation.  Recommend a home electrical stimulation unit.    Please refer to the daily flowsheet for treatment today, total treatment time and time spent performing 1:1 timed codes.

## 2018-02-12 NOTE — MR AVS SNAPSHOT
"              After Visit Summary   2/12/2018    Aleksandra Michel    MRN: [id number]           Patient Information     Date Of Birth          1950        Visit Information        Provider Department      2/12/2018 11:30 AM Patricia Velazquez, PT [facility] Physical Therapy        Today's Diagnoses     Urinary incontinence        Somatic dysfunction of pelvic region           Follow-ups after your visit        Your next 10 appointments already scheduled     Feb 13, 2018  3:00 PM CST   Return Visit with Pricila Liang MD   Formerly named [facility])    [address] [address] [place] [id number] [phone]            Mar 01, 2018  3:10 PM CST   PHYSICAL with VARSHA Perkins CNP   Formerly named [facility])    [address] [place] [id number] [phone]              Who to contact     If you have questions or need follow up information about today's clinic visit or your schedule please contact [facility] PHYSICAL THERAPY directly at [phone].  Normal or non-critical lab and imaging results will be communicated to you by GetBulbhart, letter or phone within 4 business days after the clinic has received the results. If you do not hear from us within 7 days, please contact the clinic through GetBulbhart or phone. If you have a critical or abnormal lab result, we will notify you by phone as soon as possible.  Submit refill requests through Video Passports or call your pharmacy and they will forward the refill request to us. Please allow 3 business days for your refill to be completed.          Additional Information About Your Visit        MyChart Information     Video Passports lets you send messages to your doctor, view your test results, renew your prescriptions, schedule appointments and more. To sign up, go to [email or web address] . Click on \"Log in\" on the left side of the screen, " "which will take you to the Welcome page. Then click on \"Sign up Now\" on the right side of the page.     You will be asked to enter the access code listed below, as well as some personal information. Please follow the directions to create your username and password.     Your access code is: [email or web address]  Expires: 3/18/2018  3:02 PM     Your access code will  in 90 days. If you need help or a new code, please call your [facility] clinic or [phone].        Care EveryWhere ID     This is your Care EveryWhere ID. This could be used by other organizations to access your [facility] medical records  [id number]         Blood Pressure from Last 3 Encounters:   10/06/17 107/61   17 104/65   17 100/60    Weight from Last 3 Encounters:   17 58.5 kg (128 lb 14.4 oz)   17 54.2 kg (119 lb 6.4 oz)   16 55.5 kg (122 lb 6.4 oz)              We Performed the Following     BIOFEEDBACK TRAINING     [facility] PROGRESS NOTES REPORT     MANUAL THER TECH,1+REGIONS,EA 15 MIN     THERAPEUTIC EXERCISES        Primary Care Provider Office Phone # Fax #    Stephany Elizabeth Hannon, APRN [facility] [phone]       [address]  [place] [address]        Equal Access to Services     MIRELLA DOMINGUEZ : Hadii joey ku hadasho Soomaali, waaxda luqadaha, qaybta kaalmada shanyachaitanya, lisa velez . So [facility] [phone].    ATENCIÓN: Si habla español, tiene a awad disposición servicios gratuitos de asistencia lingüística. Yoon al [phone].    We comply with applicable federal civil rights laws and [place] laws. We do not discriminate on the basis of race, color, national origin, age, disability, sex, sexual orientation, or gender identity.            Thank you!     Thank you for choosing [facility] PHYSICAL THERAPY  for your care. Our goal is always to provide you with excellent care. Hearing back from our patients is one way we can continue to improve our " services. Please take a few minutes to complete the written survey that you may receive in the mail after your visit with us. Thank you!             Your Updated Medication List - Protect others around you: Learn how to safely use, store and throw away your medicines at www.disposemymeds.org.          This list is accurate as of 2/12/18 12:42 PM.  Always use your most recent med list.                   Brand Name Dispense Instructions for use Diagnosis    cetirizine 10 MG tablet    zyrTEC     Take 10 mg by mouth daily as needed for allergies        citalopram 10 MG tablet    celeXA    90 tablet    TAKE 1 TABLET (10 MG) BY MOUTH DAILY    Situational depression       * MULTI COMPLETE PO           DAILY MULTIVITAMIN PO      Take 1 tablet by mouth daily.        * PRESERVISION/LUTEIN PO      Take 1 tablet by mouth daily.        * UNABLE TO FIND      MEDICATION NAME: Beyond Osteo Young Gevity  Liquid calcium        * UNABLE TO FIND      daily MEDICATION NAME: Osteo supplement for bones. Takes liquid once daily        UNKNOWN TO PATIENT      Hormone cream from Gyno-uses twice per week        vitamin B complex with vitamin C Tabs tablet      Take 1 tablet by mouth daily        * Notice:  This list has 4 medication(s) that are the same as other medications prescribed for you. Read the directions carefully, and ask your doctor or other care provider to review them with you.

## 2018-02-13 ENCOUNTER — MEDICAL CORRESPONDENCE (OUTPATIENT)
Dept: HEALTH INFORMATION MANAGEMENT | Facility: CLINIC | Age: 68
End: 2018-02-13

## 2018-02-13 ENCOUNTER — OFFICE VISIT (OUTPATIENT)
Dept: UROLOGY | Facility: CLINIC | Age: 68
End: 2018-02-13
Payer: COMMERCIAL

## 2018-02-13 VITALS
WEIGHT: 131 LBS | HEIGHT: 63 IN | DIASTOLIC BLOOD PRESSURE: 74 MMHG | SYSTOLIC BLOOD PRESSURE: 104 MMHG | BODY MASS INDEX: 23.21 KG/M2 | OXYGEN SATURATION: 95 % | TEMPERATURE: 96.8 F | HEART RATE: 89 BPM

## 2018-02-13 DIAGNOSIS — M79.18 MYALGIA OF PELVIC FLOOR: ICD-10-CM

## 2018-02-13 DIAGNOSIS — M62.89 PELVIC FLOOR DYSFUNCTION: ICD-10-CM

## 2018-02-13 DIAGNOSIS — N39.41 URGE INCONTINENCE: Primary | ICD-10-CM

## 2018-02-13 PROCEDURE — 99213 OFFICE O/P EST LOW 20 MIN: CPT | Performed by: UROLOGY

## 2018-02-13 ASSESSMENT — PAIN SCALES - GENERAL: PAINLEVEL: NO PAIN (0)

## 2018-02-13 NOTE — PROGRESS NOTES
"February 13, 2018    Return visit    Patient returns today for follow up.  She is here with her .  Although the pelvic floor therapy did not help much she has found that the E stim unit really helped so she has the paperwork today for me to fill out and sign so she can obtain a home unit. She denies any changes in her health since last visit.    /74 (BP Location: Left arm, Patient Position: Sitting, Cuff Size: Adult Regular)  Pulse 89  Temp 96.8  F (36  C) (Oral)  Ht 1.594 m (5' 2.75\")  Wt 59.4 kg (131 lb)  SpO2 95%  BMI 23.39 kg/m2  She is comfortable, in no distress, non-labored breathing.      A/P: 67 year old F with stage I vaginal vault prolapse, small volume urinary incontinence without sensory awareness, myofascial tenderness of the pelvic floor, pelvic floor dysfunction    Estim information completed.  Patient provided a copy.  Will send a copy to her physical therapist and have another scanned to medical records    Will have her use the stim unit and return in 6 months to reassess, sooner if needed    15 minutes were spent with the patient today, > 50% in counseling and coordination of care    Pricila Liang MD MPH   of Urology    CC  Patient Care Team:  Stephany Hannon APRN CNP as PCP - General (Family Practice)                "

## 2018-02-13 NOTE — MR AVS SNAPSHOT
After Visit Summary   2/13/2018    Aleksandra Michel    MRN: [id number]           Patient Information     Date Of Birth          1950        Visit Information        Provider Department      2/13/2018 3:00 PM Pricila Liang MD [facility]        Today's Diagnoses     Urge incontinence    -  1    Pelvic floor dysfunction        Myalgia of pelvic floor          Care Instructions    Work with the stim unit    Return to see us in 6 months, sooner if needed          Follow-ups after your visit        Your next 10 appointments already scheduled     Mar 01, 2018  3:10 PM CST   PHYSICAL with VARSHA Perkins CNP   [facility]    [address] [place] [address] [phone]            Aug 14, 2018  3:00 PM CDT   Return Visit with Pricila Liang MD   [facility])    [address] [place] [id number] [phone]              Who to contact     If you have questions or need follow up information about today's clinic visit or your schedule please contact [facility] directly at [phone].  Normal or non-critical lab and imaging results will be communicated to you by MyChart, letter or phone within 4 business days after the clinic has received the results. If you do not hear from us within 7 days, please contact the clinic through MyChart or phone. If you have a critical or abnormal lab result, we will notify you by phone as soon as possible.  Submit refill requests through Sarata or call your pharmacy and they will forward the refill request to us. Please allow 3 business days for your refill to be completed.          Additional Information About Your Visit        [employer] Information     Sarata is an electronic gateway that provides easy, online access to your medical records. With Sarata, you can request a clinic  "appointment, read your test results, renew a prescription or communicate with your care team.     To sign up for [employer] visit the website at [email or web address]   You will be asked to enter the access code listed below, as well as some personal information. Please follow the directions to create your username and password.     Your access code is: [email or web address]  Expires: 3/18/2018  3:02 PM     Your access code will  in 90 days. If you need help or a new code, please contact your [facility] or call [phone] for assistance.        Care EveryWhere ID     This is your Care EveryWhere ID. This could be used by other organizations to access your [facility] medical records  [id number]        Your Vitals Were     Pulse Temperature Height Pulse Oximetry BMI (Body Mass Index)       89 96.8  F (36  C) (Oral) 1.594 m (5' 2.75\") 95% 23.39 kg/m2        Blood Pressure from Last 3 Encounters:   18 104/74   10/06/17 107/61   17 104/65    Weight from Last 3 Encounters:   18 59.4 kg (131 lb)   17 58.5 kg (128 lb 14.4 oz)   17 54.2 kg (119 lb 6.4 oz)              Today, you had the following     No orders found for display       Primary Care Provider Office Phone # Fax #    Stephany Elizabeth Hannon, APRN [facility] [phone]       [address]  [place] [address]        Equal Access to Services     [facility] Hospital[facility]ESSICA : Hadii joey harp hadasho Soomaali, waaxda luqadaha, qaybta kaalmada adeegyachaitanya, lisa velez . So [facility] [phone].    ATENCIÓN: Si habla español, tiene a awad disposición servicios gratuitos de asistencia lingüística. Llame al [phone].    We comply with applicable federal civil rights laws and [place] laws. We do not discriminate on the basis of race, color, national origin, age, disability, sex, sexual orientation, or gender identity.            Thank you!     Thank you for choosing [facility]  for your care. Our goal is always to provide you with excellent care. Hearing back from our patients is one way we can continue to improve our services. Please take a few minutes to complete the written survey that you may receive in the mail after your visit with us. Thank you!             Your Updated Medication List - Protect others around you: Learn how to safely use, store and throw away your medicines at www.disposemymeds.org.          This list is accurate as of 2/13/18  3:31 PM.  Always use your most recent med list.                   Brand Name Dispense Instructions for use Diagnosis    cetirizine 10 MG tablet    zyrTEC     Take 10 mg by mouth daily as needed for allergies        citalopram 10 MG tablet    celeXA    90 tablet    TAKE 1 TABLET (10 MG) BY MOUTH DAILY    Situational depression       * MULTI COMPLETE PO           DAILY MULTIVITAMIN PO      Take 1 tablet by mouth daily.        * PRESERVISION/LUTEIN PO      Take 1 tablet by mouth daily.        * UNABLE TO FIND      MEDICATION NAME: Beyond Osteo Young Gevity  Liquid calcium        * UNABLE TO FIND      daily MEDICATION NAME: Osteo supplement for bones. Takes liquid once daily        UNKNOWN TO PATIENT      Hormone cream from Gyno-uses twice per week        vitamin B complex with vitamin C Tabs tablet      Take 1 tablet by mouth daily        * Notice:  This list has 4 medication(s) that are the same as other medications prescribed for you. Read the directions carefully, and ask your doctor or other care provider to review them with you.

## 2018-02-13 NOTE — NURSING NOTE
"Aleksandra Michel's goals for this visit include: discuss options  She requests these members of her care team be copied on today's visit information:     PCP: Stephany Hannon    Referring Provider:  No referring provider defined for this encounter.    Chief Complaint   Patient presents with     RECHECK     Myalgia of pelvic floor        Initial /74 (BP Location: Left arm, Patient Position: Sitting, Cuff Size: Adult Regular)  Pulse 89  Temp 96.8  F (36  C) (Oral)  Ht 1.594 m (5' 2.75\")  Wt 59.4 kg (131 lb)  SpO2 95%  BMI 23.39 kg/m2 Estimated body mass index is 23.39 kg/(m^2) as calculated from the following:    Height as of this encounter: 1.594 m (5' 2.75\").    Weight as of this encounter: 59.4 kg (131 lb).  Medication Reconciliation: complete    Do you need any medication refills at today's visit? No    Ebony Maharaj LPN    "

## 2018-03-01 ENCOUNTER — OFFICE VISIT (OUTPATIENT)
Dept: PEDIATRICS | Facility: CLINIC | Age: 68
End: 2018-03-01
Payer: COMMERCIAL

## 2018-03-01 ENCOUNTER — TELEPHONE (OUTPATIENT)
Dept: PEDIATRICS | Facility: CLINIC | Age: 68
End: 2018-03-01

## 2018-03-01 VITALS
DIASTOLIC BLOOD PRESSURE: 50 MMHG | OXYGEN SATURATION: 95 % | TEMPERATURE: 97.3 F | WEIGHT: 130.2 LBS | BODY MASS INDEX: 23.25 KG/M2 | HEART RATE: 99 BPM | SYSTOLIC BLOOD PRESSURE: 100 MMHG

## 2018-03-01 DIAGNOSIS — Z23 PNEUMOCOCCAL VACCINATION GIVEN: ICD-10-CM

## 2018-03-01 DIAGNOSIS — Z00.00 ENCOUNTER FOR ROUTINE ADULT HEALTH EXAMINATION WITHOUT ABNORMAL FINDINGS: Primary | ICD-10-CM

## 2018-03-01 DIAGNOSIS — Z13.6 CARDIOVASCULAR SCREENING; LDL GOAL LESS THAN 160: ICD-10-CM

## 2018-03-01 DIAGNOSIS — N89.8 VAGINAL ODOR: ICD-10-CM

## 2018-03-01 DIAGNOSIS — R35.0 URINARY FREQUENCY: ICD-10-CM

## 2018-03-01 DIAGNOSIS — Z13.1 SCREENING FOR DIABETES MELLITUS: ICD-10-CM

## 2018-03-01 DIAGNOSIS — Z11.59 NEED FOR HEPATITIS C SCREENING TEST: ICD-10-CM

## 2018-03-01 DIAGNOSIS — Z13.29 SCREENING FOR THYROID DISORDER: ICD-10-CM

## 2018-03-01 DIAGNOSIS — Z12.31 ENCOUNTER FOR SCREENING MAMMOGRAM FOR BREAST CANCER: ICD-10-CM

## 2018-03-01 DIAGNOSIS — Z78.0 POSTMENOPAUSAL STATUS: ICD-10-CM

## 2018-03-01 LAB
ALBUMIN UR-MCNC: NEGATIVE MG/DL
APPEARANCE UR: CLEAR
BILIRUB UR QL STRIP: NEGATIVE
COLOR UR AUTO: ABNORMAL
GLUCOSE UR STRIP-MCNC: NEGATIVE MG/DL
HGB UR QL STRIP: ABNORMAL
KETONES UR STRIP-MCNC: NEGATIVE MG/DL
LEUKOCYTE ESTERASE UR QL STRIP: NEGATIVE
NITRATE UR QL: NEGATIVE
PH UR STRIP: 5.5 PH (ref 5–7)
RBC #/AREA URNS AUTO: ABNORMAL /HPF
SOURCE: ABNORMAL
SP GR UR STRIP: 1 (ref 1–1.03)
SPECIMEN SOURCE: NORMAL
UROBILINOGEN UR STRIP-MCNC: NORMAL MG/DL (ref 0–2)
WBC #/AREA URNS AUTO: ABNORMAL /HPF
WET PREP SPEC: NORMAL

## 2018-03-01 PROCEDURE — 90732 PPSV23 VACC 2 YRS+ SUBQ/IM: CPT | Performed by: NURSE PRACTITIONER

## 2018-03-01 PROCEDURE — 81001 URINALYSIS AUTO W/SCOPE: CPT | Performed by: NURSE PRACTITIONER

## 2018-03-01 PROCEDURE — 87086 URINE CULTURE/COLONY COUNT: CPT | Performed by: NURSE PRACTITIONER

## 2018-03-01 PROCEDURE — 87210 SMEAR WET MOUNT SALINE/INK: CPT | Performed by: NURSE PRACTITIONER

## 2018-03-01 PROCEDURE — G0009 ADMIN PNEUMOCOCCAL VACCINE: HCPCS | Performed by: NURSE PRACTITIONER

## 2018-03-01 PROCEDURE — G0439 PPPS, SUBSEQ VISIT: HCPCS | Performed by: NURSE PRACTITIONER

## 2018-03-01 NOTE — PATIENT INSTRUCTIONS
PLAN:   1.   Symptomatic therapy suggested:   Increase calcium to 1000mg and 1000 iu Vit D  2.  Orders Placed This Encounter   Procedures     MA Screening Digital Bilateral     DX Hip/Pelvis/Spine     ADMIN MEDICARE: Pneumococcal Vaccine ()     Pneumococcal vaccine 23 valent PPSV23  (Pneumovax) [84243]     Lipid panel reflex to direct LDL Fasting     Comprehensive metabolic panel     TSH with free T4 reflex     JUST IN CASE     Hepatitis C Screen Reflex to HCV RNA Quant and Genotype     UA with Microscopic reflex to Culture       3. Patient needs to follow up in if no improvement,or sooner if worsening of symptoms or other symptoms develop.  FURTHER TESTING:       - DXA (bone density) scan  FUTURE LABS:       - Schedule a fasting blood draw   Will follow up and/or notify patient on results via phone or mail to determine further need for followup  Follow up office visit in one year for annual health maintenance exam, sooner PRN.    Preventive Health Recommendations    Female Ages 65 +    Yearly exam:     See your health care provider every year in order to  o Review health changes.   o Discuss preventive care.    o Review your medicines if your doctor has prescribed any.      You no longer need a yearly Pap test unless you've had an abnormal Pap test in the past 10 years. If you have vaginal symptoms, such as bleeding or discharge, be sure to talk with your provider about a Pap test.      Every 1 to 2 years, have a mammogram.  If you are over 69, talk with your health care provider about whether or not you want to continue having screening mammograms.      Every 10 years, have a colonoscopy. Or, have a yearly FIT test (stool test). These exams will check for colon cancer.       Have a cholesterol test every 5 years, or more often if your doctor advises it.       Have a diabetes test (fasting glucose) every three years. If you are at risk for diabetes, you should have this test more often.       At age 65, have a  bone density scan (DEXA) to check for osteoporosis (brittle bone disease).    Shots:    Get a flu shot each year.    Get a tetanus shot every 10 years.    Talk to your doctor about your pneumonia vaccines. There are now two you should receive - Pneumovax (PPSV 23) and Prevnar (PCV 13).    Talk to your doctor about the shingles vaccine.    Talk to your doctor about the hepatitis B vaccine.    Nutrition:     Eat at least 5 servings of fruits and vegetables each day.      Eat whole-grain bread, whole-wheat pasta and brown rice instead of white grains and rice.      Talk to your provider about Calcium and Vitamin D.     Lifestyle    Exercise at least 150 minutes a week (30 minutes a day, 5 days a week). This will help you control your weight and prevent disease.      Limit alcohol to one drink per day.      No smoking.       Wear sunscreen to prevent skin cancer.       See your dentist twice a year for an exam and cleaning.      See your eye doctor every 1 to 2 years to screen for conditions such as glaucoma, macular degeneration and cataracts.  It was a pleasure seeing you today at the [facility]. Thank you for allowing us to care for you today. We truly hope we provided you with the excellent service you deserve. Please let us know if there is anything else we can do for you so we can be sure you are leaving completley satisfied with your care experience.       General information about your clinic   Clinic Hours Lab Hours (Appointments are required)   Mon-Thurs: 7:30 AM - 7 PM Mon-Thurs: 7:30 AM - 7 PM   Fri: 7:30 AM - 5 PM Fri: 7:30 AM - 5 PM        After Hours Nurse Advise & Appts:  [facility] Nurse [facility]visors: [phone]  [facility] On Call: to make appointments anytime: [phone] On Call Physician: call [phone] and answering service will page the on call physician.        For urgent appointments, please call [phone] and ask for the triage nurse or your care team clinic  nurse.  How to contact my care team:  [facility]: [email or web address]   Phone: [phone]   Fax: [phone]       [facility]:   Phone: [phone]  Hours: 8:00 AM - 6:00 PM  Medication Refills:  Call your pharmacy and they will forward the refill to us. Please allow 3 business days for your refills to be completed.       Normal or non-critical lab and imaging results will be communicated to you by MyChart, letter or phone within 7 days.  If you do not hear from us within 10 days, please call the clinic. If you have a critical or abnormal lab result, we will notify you by phone as soon as possible.       We now have PWIC (Pediatric Walk in Care)  Monday-Friday from 7:30-4. Simply walk in and be seen for your urgent needs like cough, fever, rash, diarrhea or vomiting, pink eye, UTI. No appointments needed. Ask one of the team for more information      -Your Care Team:    Dr. Ron Butterfield - Internal Medicine/Pediatrics   Dr. Casimiro Flood - Family Medicine  Dr. Barbra Pennington - Pediatrics  Dr. Eva Thomas - Pediatrics  Stephany Hannon CNP - Family Practice Nurse Practitioner

## 2018-03-01 NOTE — MR AVS SNAPSHOT
After Visit Summary   3/1/2018    Aleksandra Michel    MRN: [id number]           Patient Information     Date Of Birth          1950        Visit Information        Provider Department      3/1/2018 3:10 PM Stephany Hannon APRN CNP [facility]        Today's Diagnoses     Encounter for routine adult health examination without abnormal findings    -  1    CARDIOVASCULAR SCREENING; LDL GOAL LESS THAN 160        Screening for diabetes mellitus        Screening for thyroid disorder        Encounter for screening mammogram for breast cancer        Postmenopausal status        Need for hepatitis C screening test        Pneumococcal vaccination given        Vaginal odor        Urinary frequency          Care Instructions    PLAN:   1.   Symptomatic therapy suggested:   Increase calcium to 1000mg and 1000 iu Vit D  2.  Orders Placed This Encounter   Procedures     MA Screening Digital Bilateral     DX Hip/Pelvis/Spine     ADMIN MEDICARE: Pneumococcal Vaccine ()     Pneumococcal vaccine 23 valent PPSV23  (Pneumovax) [89102]     Lipid panel reflex to direct LDL Fasting     Comprehensive metabolic panel     TSH with free T4 reflex     JUST IN CASE     Hepatitis C Screen Reflex to HCV RNA Quant and Genotype     UA with Microscopic reflex to Culture       3. Patient needs to follow up in if no improvement,or sooner if worsening of symptoms or other symptoms develop.  FURTHER TESTING:       - DXA (bone density) scan  FUTURE LABS:       - Schedule a fasting blood draw   Will follow up and/or notify patient on results via phone or mail to determine further need for followup  Follow up office visit in one year for annual health maintenance exam, sooner PRN.    Preventive Health Recommendations    Female Ages 65 +    Yearly exam:     See your health care provider every year in order to  o Review health changes.   o Discuss preventive care.    o Review your medicines if your doctor has  prescribed any.      You no longer need a yearly Pap test unless you've had an abnormal Pap test in the past 10 years. If you have vaginal symptoms, such as bleeding or discharge, be sure to talk with your provider about a Pap test.      Every 1 to 2 years, have a mammogram.  If you are over 69, talk with your health care provider about whether or not you want to continue having screening mammograms.      Every 10 years, have a colonoscopy. Or, have a yearly FIT test (stool test). These exams will check for colon cancer.       Have a cholesterol test every 5 years, or more often if your doctor advises it.       Have a diabetes test (fasting glucose) every three years. If you are at risk for diabetes, you should have this test more often.       At age 65, have a bone density scan (DEXA) to check for osteoporosis (brittle bone disease).    Shots:    Get a flu shot each year.    Get a tetanus shot every 10 years.    Talk to your doctor about your pneumonia vaccines. There are now two you should receive - Pneumovax (PPSV 23) and Prevnar (PCV 13).    Talk to your doctor about the shingles vaccine.    Talk to your doctor about the hepatitis B vaccine.    Nutrition:     Eat at least 5 servings of fruits and vegetables each day.      Eat whole-grain bread, whole-wheat pasta and brown rice instead of white grains and rice.      Talk to your provider about Calcium and Vitamin D.     Lifestyle    Exercise at least 150 minutes a week (30 minutes a day, 5 days a week). This will help you control your weight and prevent disease.      Limit alcohol to one drink per day.      No smoking.       Wear sunscreen to prevent skin cancer.       See your dentist twice a year for an exam and cleaning.      See your eye doctor every 1 to 2 years to screen for conditions such as glaucoma, macular degeneration and cataracts.  It was a pleasure seeing you today at the [facility]. Thank you for allowing us to care  for you today. We truly hope we provided you with the excellent service you deserve. Please let us know if there is anything else we can do for you so we can be sure you are leaving completley satisfied with your care experience.       General information about your clinic   Clinic Hours Lab Hours (Appointments are required)   Mon-Thurs: 7:30 AM - 7 PM Mon-Thurs: 7:30 AM - 7 PM   Fri: 7:30 AM - 5 PM Fri: 7:30 AM - 5 PM        After Hours Nurse Advise & Appts:  [facility]: [phone]  [facility] On Call: to make appointments anytime: [phone] On Call Physician: call [phone] and answering service will page the on call physician.        For urgent appointments, please call [phone] and ask for the triage nurse or your care team clinic nurse.  How to contact my care team:  MyChart: [email or web address]   Phone: [phone]   Fax: [phone]       [facility]:   Phone: [phone]  Hours: 8:00 AM - 6:00 PM  Medication Refills:  Call your pharmacy and they will forward the refill to us. Please allow 3 business days for your refills to be completed.       Normal or non-critical lab and imaging results will be communicated to you by MyChart, letter or phone within 7 days.  If you do not hear from us within 10 days, please call the clinic. If you have a critical or abnormal lab result, we will notify you by phone as soon as possible.       We now have PWIC (Pediatric Walk in Care)  Monday-Friday from 7:30-4. Simply walk in and be seen for your urgent needs like cough, fever, rash, diarrhea or vomiting, pink eye, UTI. No appointments needed. Ask one of the team for more information      -Your Care Team:    Dr. Ron Butterfield - Internal Medicine/Pediatrics   Dr. Casimiro Flood - Family Medicine  Dr. Barbra Pennington - Pediatrics  Dr. Eva Thomas - Pediatrics  Stephany Hannon CNP - Family Practice Nurse Practitioner            Follow-ups after your visit        Your next 10 appointments already  scheduled     Mar 12, 2018 11:30 AM CDT   [facility] Only with Patricia Velazquez, PT   [facility] ([facility] [place]  )    [address] [facility] [address] [phone]            Aug 14, 2018  3:00 PM CDT   [facility]    [address] [facility] [address] [phone]              Future tests that were ordered for you today     Open Future Orders        Priority Expected Expires Ordered    Hepatitis C Screen Reflex to HCV RNA Quant and Genotype Routine  3/1/2019 3/1/2018    DX Hip/Pelvis/Spine Routine  3/1/2019 3/1/2018    MA Screening Digital Bilateral Routine  3/1/2019 3/1/2018    Lipid panel reflex to direct LDL Fasting Routine  12/1/2018 3/1/2018    Comprehensive metabolic panel Routine  12/1/2018 3/1/2018    TSH with free T4 reflex Routine  12/1/2018 3/1/2018    JUST IN CASE Routine  12/1/2018 3/1/2018            Who to contact     If you have questions or need follow up information about today's clinic visit or your schedule please contact [facility] directly at [phone].  Normal or non-critical lab and imaging results will be communicated to you by [facility], letter or phone within 4 business days after the clinic has received the results. If you do not hear from us within 7 days, please contact the clinic through MyChart or phone. If you have a critical or abnormal lab result, we will notify you by phone as soon as possible.  Submit refill requests through Experiment or call your pharmacy and they will forward the refill request to us. Please allow 3 business days for your refill to be completed.          Additional Information About Your Visit        Experiment Information     Experiment is an electronic gateway that provides easy, online access to your medical records. With Experiment, you can request a clinic appointment, read your test  results, renew a prescription or communicate with your care team.     To sign up for [facility] visit the website at [email or web address]   You will be asked to enter the access code listed below, as well as some personal information. Please follow the directions to create your username and password.     Your access code is: [email or web address]  Expires: 3/18/2018  3:02 PM     Your access code will  in 90 days. If you need help or a new code, please contact your [facility] or call [phone] for assistance.        Care EveryWhere ID     This is your Care EveryWhere ID. This could be used by other organizations to access your [place] medical records  [id number]        Your Vitals Were     Pulse Temperature Pulse Oximetry Breastfeeding? BMI (Body Mass Index)       99 97.3  F (36.3  C) (Temporal) 95% No 23.25 kg/m2        Blood Pressure from Last 3 Encounters:   18 100/50   18 104/74   10/06/17 107/61    Weight from Last 3 Encounters:   18 130 lb 3.2 oz (59.1 kg)   18 131 lb (59.4 kg)   17 128 lb 14.4 oz (58.5 kg)              We Performed the Following     ADMIN MEDICARE: Pneumococcal Vaccine ()     Pneumococcal vaccine 23 valent PPSV23  (Pneumovax) [72961]     UA with Microscopic reflex to Culture     Wet prep        Primary Care Provider Office Phone # Fax #    Stephany Elizabeth Hannon, APRN [facility] [phone]       [address]  [place] [address]        Equal Access to Services     MIRELLA DOMINGUEZ : Hadii joey Lizarraga, waaxda luqadaha, qaybta kaallisa kee. So [facility] [phone].    ATENCIÓN: Si habla español, tiene a awad disposición servicios gratuitos de asistencia lingüística. Llame al [phone].    We comply with applicable federal civil rights laws and [place] laws. We do not discriminate on the basis of race, color, national origin, age, disability, sex,  sexual orientation, or gender identity.            Thank you!     Thank you for choosing [facility]  for your care. Our goal is always to provide you with excellent care. Hearing back from our patients is one way we can continue to improve our services. Please take a few minutes to complete the written survey that you may receive in the mail after your visit with us. Thank you!             Your Updated Medication List - Protect others around you: Learn how to safely use, store and throw away your medicines at www.disposemymeds.org.          This list is accurate as of 3/1/18  3:58 PM.  Always use your most recent med list.                   Brand Name Dispense Instructions for use Diagnosis    citalopram 10 MG tablet    celeXA    90 tablet    TAKE 1 TABLET (10 MG) BY MOUTH DAILY    Situational depression       * MULTI COMPLETE PO           DAILY MULTIVITAMIN PO      Take 1 tablet by mouth daily.        * PRESERVISION/LUTEIN PO      Take 1 tablet by mouth daily.        * UNABLE TO FIND      MEDICATION NAME: Beyond Osteo Young Gevity  Liquid calcium        * UNABLE TO FIND      daily MEDICATION NAME: Osteo supplement for bones. Takes liquid once daily        UNKNOWN TO PATIENT      Hormone cream from Gyno-uses twice per week        vitamin B complex with vitamin C Tabs tablet      Take 1 tablet by mouth daily        * Notice:  This list has 4 medication(s) that are the same as other medications prescribed for you. Read the directions carefully, and ask your doctor or other care provider to review them with you.

## 2018-03-01 NOTE — PROGRESS NOTES
SUBJECTIVE:   Aleksandra Michel is a 67 year old female who presents for Preventive Visit.    Are you in the first 12 months of your Medicare Part B coverage?  No    Healthy Habits:    Do you get at least three servings of calcium containing foods daily (dairy, green leafy vegetables, etc.)? yes    Amount of exercise or daily activities, outside of work: active at work, standing    Problems taking medications regularly No    Medication side effects: No    Have you had an eye exam in the past two years? yes    Do you see a dentist twice per year? yes    Do you have sleep apnea, excessive snoring or daytime drowsiness?no      Ability to successfully perform activities of daily living: Yes, no assistance needed    Home safety:  none identified     Hearing impairment: No    Fall risk:  Fallen 2 or more times in the past year?: No  Any fall with injury in the past year?: No        COGNITIVE SCREEN  1) Repeat 3 items (Banana, Sunrise, Chair)    2) Clock draw: NORMAL  3) 3 item recall: Recalls 3 objects  Results: 3 items recalled: COGNITIVE IMPAIRMENT LESS LIKELY    Mini-CogTM Michael Fowler. Licensed by the author for use in [facility]; reprinted with permission ([email or web address]). All rights reserved.      Reviewed and updated as needed this visit by clinical staff  Tobacco  Allergies  Meds  Med Hx  Surg Hx  Fam Hx  Soc Hx        Reviewed and updated as needed this visit by Provider        Social History   Substance Use Topics     Smoking status: Former Smoker     Years: 4.00     Types: Cigarettes     Smokeless tobacco: Never Used     Alcohol use Yes      Comment: occasional glass of wine       If you drink alcohol do you typically have >3 drinks per day or >7 drinks per week? No                        Today's PHQ-2 Score:   PHQ-2 ( 1999 [employer]) 9/18/2017 12/30/2016   Q1: Little interest or pleasure in doing things 1 1   Q2: Feeling down, depressed or hopeless 1 1   PHQ-2 Score 2 2       Do you feel  safe in your environment - Yes    Do you have a Health Care Directive?: Yes: Advance Directive has been received and scanned.    Current providers sharing in care for this patient include:   Patient Care Team:  Stephany Hannon APRN CNP as PCP - General (Family Practice)    The following health maintenance items are reviewed in Epic and correct as of today:  Health Maintenance   Topic Date Due     HEPATITIS C SCREENING  03/28/1966     PNEUMOCOCCAL (2 of 2 - PPSV23) 01/17/2018     EYE EXAM Q1 YEAR  05/02/2018     COLONOSCOPY Q5 YR  07/18/2018     INFLUENZA VACCINE (SYSTEM ASSIGNED)  09/01/2018     FALL RISK ASSESSMENT  09/18/2018     MAMMO SCREEN Q2 YR (SYSTEM ASSIGNED)  02/17/2019     ADVANCE DIRECTIVE PLANNING Q5 YRS  02/18/2019     LIPID SCREEN Q5 YR FEMALE (SYSTEM ASSIGNED)  09/18/2022     TETANUS IMMUNIZATION (SYSTEM ASSIGNED)  03/27/2023     DEXA SCAN SCREENING (SYSTEM ASSIGNED)  Completed     Labs reviewed in EPIC  Patient Active Problem List   Diagnosis     Colon polyp     Seasonal allergic rhinitis     Osteoporosis     CARDIOVASCULAR SCREENING; LDL GOAL LESS THAN 160     Advanced directives, counseling/discussion     Varicose veins of both lower extremities     Past Surgical History:   Procedure Laterality Date     ENHANCE LASER REFRACTIVE BILATERAL EXISTING PT IN PARAMETERS Right ~2008    Distance eye     HYSTERECTOMY, VAGINAL  1985    age 36, prolonged menses. ovaries remain       Social History   Substance Use Topics     Smoking status: Former Smoker     Years: 4.00     Types: Cigarettes     Smokeless tobacco: Never Used     Alcohol use Yes      Comment: occasional glass of wine     Family History   Problem Relation Age of Onset     Eye Disorder Mother      macular degeneration     Macular Degeneration Mother      Arthritis Brother          Current Outpatient Prescriptions   Medication Sig Dispense Refill     UNABLE TO FIND MEDICATION NAME: Beyond Osteo Young Gevity   Liquid calcium       citalopram  (CELEXA) 10 MG tablet TAKE 1 TABLET (10 MG) BY MOUTH DAILY 90 tablet 3     Multiple Vitamins-Minerals (MULTI COMPLETE PO)        UNABLE TO FIND daily MEDICATION NAME: Osteo supplement for bones. Takes liquid once daily       vitamin B complex with vitamin C (VITAMIN  B COMPLEX) TABS tablet Take 1 tablet by mouth daily       UNKNOWN TO PATIENT Hormone cream from Gyno-uses twice per week        Multiple Vitamin (DAILY MULTIVITAMIN PO) Take 1 tablet by mouth daily.       Multiple Vitamins-Minerals (PRESERVISION/LUTEIN PO) Take 1 tablet by mouth daily.       Allergies   Allergen Reactions     Dust Mites      Mold      Seasonal Allergies        Pneumonia Vaccine:Adults age 65+ who received Pneumovax (PPSV23) at 65 years or older: Should be given PCV13 > 1 year after their most recent PPSV23  Mammogram Screening: Patient over age 50, mutual decision to screen reflected in health maintenance.    ROS:  CONSTITUTIONAL:NEGATIVE for fever, chills, change in weight  INTEGUMENTARY/SKIN: NEGATIVE for worrisome rashes, moles or lesions  EYES: NEGATIVE for vision changes or irritation  ENT: NEGATIVE for ear, mouth and throat problems  RESP:NEGATIVE for significant cough or SOB  BREAST: NEGATIVE for masses, tenderness or discharge  CV: NEGATIVE for chest pain, palpitations or peripheral edema  GI: NEGATIVE for nausea, abdominal pain, heartburn, or change in bowel habits   menopausal female: amenorrhea, no unusual urinary symptoms, no unusual vaginal symptoms and vaginal dryness  Has been seeing urology and help with urinary incontinence. Is having some discharge and has a smell  MUSCULOSKELETAL:NEGATIVE for significant arthralgias or myalgia  NEURO: NEGATIVE for weakness, dizziness or paresthesias  ENDOCRINE: NEGATIVE for temperature intolerance, skin/hair changes  HEME/ALLERGY/IMMUNE: POSITIVE  for allergies and NEGATIVE for anemia  PSYCHIATRIC: POSITIVE for stress as special needs daughter is going to need surgery. Her   "is back and they are working on their relationship after a year and half after being with another woman.  and NEGATIVE forthoughts of hurting someone else and thoughts of self harm     OBJECTIVE:   /50 (BP Location: Right arm, Patient Position: Sitting, Cuff Size: Adult Regular)  Pulse 99  Temp 97.3  F (36.3  C) (Temporal)  Wt 130 lb 3.2 oz (59.1 kg)  SpO2 95%  Breastfeeding? No  BMI 23.25 kg/m2 Estimated body mass index is 23.39 kg/(m^2) as calculated from the following:    Height as of 2/13/18: 5' 2.75\" (1.594 m).    Weight as of 2/13/18: 131 lb (59.4 kg).  EXAM:   GENERAL APPEARANCE: healthy, alert and no distress  EYES: Eyes grossly normal to inspection, PERRL and conjunctivae and sclerae normal  HENT: ear canals and TM's normal, nose and mouth without ulcers or lesions, oropharynx clear and oral mucous membranes moist  NECK: no adenopathy, no asymmetry, masses, or scars and thyroid normal to palpation  RESP: lungs clear to auscultation - no rales, rhonchi or wheezes  BREAST: normal without masses, tenderness or nipple discharge and no palpable axillary masses or adenopathy  CV: regular rate and rhythm, normal S1 S2, no S3 or S4, no murmur, click or rub, no peripheral edema and peripheral pulses strong  ABDOMEN: soft, nontender, no hepatosplenomegaly, no masses and bowel sounds normal   (female): normal female external genitalia, normal urethral meatus, vaginal mucosal atrophy noted, normal cervix, adnexae, and uterus without masses or abnormal discharge  MS: no musculoskeletal defects are noted and gait is age appropriate without ataxia  SKIN: no suspicious lesions or rashes  NEURO: Normal strength and tone, sensory exam grossly normal, mentation intact and speech normal  PSYCH: mentation appears normal and affect normal/bright  Results for orders placed or performed in visit on 03/01/18   UA with Microscopic reflex to Culture   Result Value Ref Range    Color Urine Light Yellow     Appearance " Urine Clear     Glucose Urine Negative NEG^Negative mg/dL    Bilirubin Urine Negative NEG^Negative    Ketones Urine Negative NEG^Negative mg/dL    Specific Gravity Urine 1.003 1.003 - 1.035    Blood Urine Trace (A) NEG^Negative    pH Urine 5.5 5.0 - 7.0 pH    Protein Albumin Urine Negative NEG^Negative mg/dL    Urobilinogen mg/dL Normal 0.0 - 2.0 mg/dL    Nitrite Urine Negative NEG^Negative    Leukocyte Esterase Urine Negative NEG^Negative    Source Midstream Urine     WBC Urine O - 2 (A) OTO5^0 - 5 /HPF    RBC Urine O - 2 OTO2^O - 2 /HPF   Wet prep   Result Value Ref Range    Specimen Description Cervix     Wet Prep No Trichomonas seen     Wet Prep No clue cells seen     Wet Prep No yeast seen    Urine Culture Aerobic Bacterial   Result Value Ref Range    Specimen Description Midstream Urine     Culture Micro No growth        ASSESSMENT / PLAN:   Aleksandra was seen today for physical and wellness visit.    Diagnoses and all orders for this visit:    Encounter for routine adult health examination without abnormal findings  -     Lipid panel reflex to direct LDL Fasting; Future  -     Comprehensive metabolic panel; Future  -     TSH with free T4 reflex; Future  -     JUST IN CASE; Future  -     Hepatitis C Screen Reflex to HCV RNA Quant and Genotype; Future    CARDIOVASCULAR SCREENING; LDL GOAL LESS THAN 160  -     Lipid panel reflex to direct LDL Fasting; Future    Screening for diabetes mellitus  -     Comprehensive metabolic panel; Future    Screening for thyroid disorder  -     TSH with free T4 reflex; Future    Encounter for screening mammogram for breast cancer  -     MA Screening Digital Bilateral; Future    Postmenopausal status  -     DX Hip/Pelvis/Spine; Future    Need for hepatitis C screening test  -     Hepatitis C Screen Reflex to HCV RNA Quant and Genotype; Future    Pneumococcal vaccination given  -     Cancel: Pneumococcal vaccine 13 valent PCV13 IM (Prevnar) [41578]  -     ADMIN MEDICARE: Pneumococcal  "Vaccine ()  -     Pneumococcal vaccine 23 valent PPSV23  (Pneumovax) [07797]    Vaginal odor  -     Wet prep    Urinary frequency  -     UA with Microscopic reflex to Culture  -     Urine Culture Aerobic Bacterial      PLAN:   . Patient needs to follow up in if no improvement,or sooner if worsening of symptoms or other symptoms develop.  FURTHER TESTING:       - DXA (bone density) scan  FUTURE LABS:       - Schedule a fasting blood draw   Will follow up and/or notify patient on results via phone or mail to determine further need for followup  Follow up office visit in one year for annual health maintenance exam, sooner PRN.      End of Life Planning:  Patient currently has an advanced directive: No.  I have verified the patient's ablity to prepare an advanced directive/make health care decisions.  Literature was provided to assist patient in preparing an advanced directive.    COUNSELING:  Reviewed preventive health counseling, as reflected in patient instructions  Special attention given to:       Regular exercise       Healthy diet/nutrition       Immunizations    Vaccinated for: Pneumococcal             Osteoporosis Prevention/Bone Health       Colon cancer screening       Hepatitis C screening       The ASCVD Risk score ([place] HOLLIE Jr, et al., 2013) failed to calculate for the following reasons:    The valid HDL cholesterol range is 20 to 100 mg/dL       Advanced Planning         Estimated body mass index is 23.39 kg/(m^2) as calculated from the following:    Height as of 2/13/18: 5' 2.75\" (1.594 m).    Weight as of 2/13/18: 131 lb (59.4 kg).       reports that she has quit smoking. Her smoking use included Cigarettes. She quit after 4.00 years of use. She has never used smokeless tobacco.      Appropriate preventive services were discussed with this patient, including applicable screening as appropriate for cardiovascular disease, diabetes, osteopenia/osteoporosis, and glaucoma.  As appropriate for " age/gender, discussed screening for colorectal cancer, prostate cancer, breast cancer, and cervical cancer. Checklist reviewing preventive services available has been given to the patient.    Reviewed patients plan of care and provided an AVS. The Basic Care Plan (routine screening as documented in Health Maintenance) for Aleksandra meets the Care Plan requirement. This Care Plan has been established and reviewed with the Patient.    Counseling Resources:  ATP IV Guidelines  Pooled Cohorts Equation Calculator  Breast Cancer Risk Calculator  FRAX Risk Assessment  ICSI Preventive Guidelines  Dietary Guidelines for Americans, 2010  USDA's MyPlate  ASA Prophylaxis  Lung CA Screening    VARSHA Perkins CNP  [facility]

## 2018-03-01 NOTE — TELEPHONE ENCOUNTER
Stephany Hannon APRN CNP  [facility]  Primary Care                   Please call   -No signs of bacteria or yeast vaginal infections.   Stephany Hannon NP, VARSHA CNP                Associated Results         Wet prep   Status:  Final result   Visible to patient:  No (Not Released) Dx:  Vaginal odor Order: [id number]       Notes Recorded by Stephany Hannon APRN CNP on 3/1/2018 at 4:22 PM  Please call   -No signs of bacteria or yeast vaginal infections.  Stephany Hannon NP, VARSHA CNP          3:45 PM       Specimen Description Cervix     Wet Prep No Trichomonas seen     Wet Prep No clue cells seen     Wet Prep No yeast seen     Resulting Agency [facility]

## 2018-03-01 NOTE — NURSING NOTE
"Chief Complaint   Patient presents with     Physical     AFE     Wellness Visit       Initial /50 (BP Location: Right arm, Patient Position: Sitting, Cuff Size: Adult Regular)  Pulse 99  Temp 97.3  F (36.3  C) (Temporal)  Wt 130 lb 3.2 oz (59.1 kg)  SpO2 95%  Breastfeeding? No  BMI 23.25 kg/m2 Estimated body mass index is 23.25 kg/(m^2) as calculated from the following:    Height as of 2/13/18: 5' 2.75\" (1.594 m).    Weight as of this encounter: 130 lb 3.2 oz (59.1 kg).  Medication Reconciliation: complete      MEENU Jordan      "

## 2018-03-02 LAB
BACTERIA SPEC CULT: NO GROWTH
SPECIMEN SOURCE: NORMAL

## 2018-03-05 ENCOUNTER — TELEPHONE (OUTPATIENT)
Dept: PEDIATRICS | Facility: CLINIC | Age: 68
End: 2018-03-05

## 2018-03-05 NOTE — TELEPHONE ENCOUNTER
Notes Recorded by Stephany Hannon, VARSHA CNP on 3/3/2018 at 10:57 PM  Please call   -Urine culture is normal.  There is no need for antibiotics at this point.  If new, worsening or persistent symptoms, then you should call or return for a recheck.  Stephany Hannon NP, APRN CNP          4d ago       Specimen Description Midstream Urine     Culture Micro No growth     Resulting Agency [facility]          Specimen Collected: 03/01/18  4:00 PM

## 2018-03-12 ENCOUNTER — RADIANT APPOINTMENT (OUTPATIENT)
Dept: BONE DENSITY | Facility: CLINIC | Age: 68
End: 2018-03-12
Attending: NURSE PRACTITIONER
Payer: COMMERCIAL

## 2018-03-12 ENCOUNTER — THERAPY VISIT (OUTPATIENT)
Dept: PHYSICAL THERAPY | Facility: CLINIC | Age: 68
End: 2018-03-12
Payer: MEDICARE

## 2018-03-12 DIAGNOSIS — N39.3 FEMALE STRESS INCONTINENCE: ICD-10-CM

## 2018-03-12 DIAGNOSIS — Z78.0 POSTMENOPAUSAL STATUS: ICD-10-CM

## 2018-03-12 DIAGNOSIS — M99.05 SOMATIC DYSFUNCTION OF PELVIC REGION: Primary | ICD-10-CM

## 2018-03-12 PROCEDURE — G8988 SELF CARE GOAL STATUS: HCPCS | Mod: GP | Performed by: PHYSICAL THERAPIST

## 2018-03-12 PROCEDURE — G8989 SELF CARE D/C STATUS: HCPCS | Mod: GP | Performed by: PHYSICAL THERAPIST

## 2018-03-12 PROCEDURE — 77080 DXA BONE DENSITY AXIAL: CPT | Performed by: RADIOLOGY

## 2018-03-12 PROCEDURE — G0283 ELEC STIM OTHER THAN WOUND: HCPCS | Mod: GP | Performed by: PHYSICAL THERAPIST

## 2018-03-12 NOTE — MR AVS SNAPSHOT
After Visit Summary   3/12/2018    Aleksandra Michel    MRN: [id number]           Patient Information     Date Of Birth          1950        Visit Information        Provider Department      3/12/2018 11:30 AM Patricia Velazquez, PT [facility] Physical Therapy        Today's Diagnoses     Somatic dysfunction of pelvic region    -  1    Female stress incontinence           Follow-ups after your visit        Your next 10 appointments already scheduled     Apr 03, 2018  9:30 AM CDT   LAB with [facility] ([facility] [place] [facility])    [address] [place] [id number] [phone]           Please do not eat 10-12 hours before your appointment if you are coming in fasting for labs on lipids, cholesterol, or glucose (sugar). This does not apply to pregnant women. Water, hot tea and black coffee (with nothing added) are okay. Do not drink other fluids, diet soda or chew gum.            Aug 14, 2018  3:00 PM CDT   Return Visit with Pricila Linag MD   [facility])    [address] [place] [address] [id number] [phone]              Who to contact     If you have questions or need follow up information about today's clinic visit or your schedule please contact [facility] PHYSICAL THERAPY directly at [phone].  Normal or non-critical lab and imaging results will be communicated to you by MyChart, letter or phone within 4 business days after the clinic has received the results. If you do not hear from us within 7 days, please contact the clinic through MyChart or phone. If you have a critical or abnormal lab result, we will notify you by phone as soon as possible.  Submit refill requests through NextPotential or call your pharmacy and they will forward the refill request to us. Please allow 3 business days for your refill to be completed.           "Additional Information About Your Visit        MyChart Information     Interactive Fate lets you send messages to your doctor, view your test results, renew your prescriptions, schedule appointments and more. To sign up, go to [email or web address] . Click on \"Log in\" on the left side of the screen, which will take you to the Welcome page. Then click on \"Sign up Now\" on the right side of the page.     You will be asked to enter the access code listed below, as well as some personal information. Please follow the directions to create your username and password.     Your access code is: [email or web address]  Expires: 3/18/2018  4:02 PM     Your access code will  in 90 days. If you need help or a new code, please call your [place] clinic or [phone].        Care EveryWhere ID     This is your Care EveryWhere ID. This could be used by other organizations to access your [place] medical records  [id number]         Blood Pressure from Last 3 Encounters:   18 100/50   18 104/74   10/06/17 107/61    Weight from Last 3 Encounters:   18 59.1 kg (130 lb 3.2 oz)   18 59.4 kg (131 lb)   17 58.5 kg (128 lb 14.4 oz)              We Performed the Following     ELECTRIC STIMULATION THERAPY     MICHAEL PROGRESS NOTES REPORT        Primary Care Provider Office Phone # Fax #    Stephany Elizabeth Hannon, APRN [facility] [phone]       [address]  [place] [address]        Equal Access to Services     [facility]: Hadii aad ku hadasho Soomaali, waaxda luqadaha, qaybta kaalmada adeegamado, lisa velez . So [facility] [phone].    ATENCIÓN: Si habla español, tiene a awad disposición servicios gratuitos de asistencia lingüística. Llame al [phone].    We comply with applicable federal civil rights laws and [place] laws. We do not discriminate on the basis of race, color, national origin, age, disability, sex, sexual orientation, or gender identity.          "   Thank you!     Thank you for choosing [facility] PHYSICAL THERAPY  for your care. Our goal is always to provide you with excellent care. Hearing back from our patients is one way we can continue to improve our services. Please take a few minutes to complete the written survey that you may receive in the mail after your visit with us. Thank you!             Your Updated Medication List - Protect others around you: Learn how to safely use, store and throw away your medicines at www.disposemymeds.org.          This list is accurate as of 3/12/18 12:09 PM.  Always use your most recent med list.                   Brand Name Dispense Instructions for use Diagnosis    citalopram 10 MG tablet    celeXA    90 tablet    TAKE 1 TABLET (10 MG) BY MOUTH DAILY    Situational depression       * MULTI COMPLETE PO           DAILY MULTIVITAMIN PO      Take 1 tablet by mouth daily.        * PRESERVISION/LUTEIN PO      Take 1 tablet by mouth daily.        * UNABLE TO FIND      MEDICATION NAME: Beyond Osteo Young Gevity  Liquid calcium        * UNABLE TO FIND      daily MEDICATION NAME: Osteo supplement for bones. Takes liquid once daily        UNKNOWN TO PATIENT      Hormone cream from Gyno-uses twice per week        vitamin B complex with vitamin C Tabs tablet      Take 1 tablet by mouth daily        * Notice:  This list has 4 medication(s) that are the same as other medications prescribed for you. Read the directions carefully, and ask your doctor or other care provider to review them with you.

## 2018-03-12 NOTE — PROGRESS NOTES
Subjective:  HPI                    Objective:  System    Physical Exam    General     ROS    Assessment/Plan:    DISCHARGE REPORT    Progress reporting period is from 2/12/18 to 3/19/18.       SUBJECTIVE  Subjective changes noted by patient:  Patient reports she followed up with MD and it was recomended to trail a home ESTIM unit.  She come today to go over home use of the unit. She feels the home exercises have been going well.      Current pain level is 0/10.     Previous pain level was 4/10.   Changes in function:  None  Adverse reaction to treatment or activity: None    OBJECTIVE  Changes noted in objective findings:      There is depth loss with only able to enter to the depth of the MCP joint.  Discussed how to use home ESTIM .     ASSESSMENT/PLAN  Updated problem list and treatment plan: Diagnosis 1:  Pelvic dysfunction    STG/LTGs have been met or progress has been made towards goals:  Yes (See Goal flow sheet completed today.)  Assessment of Progress: The patient's condition has potential to improve.  Self Management Plans:  Patient is independent in a home treatment program.  Patient is independent in self management of symptoms.  I have re-evaluated this patient and find that the nature, scope, duration and intensity of the therapy is appropriate for the medical condition of the patient.  Aleksandra continues to require the following intervention to meet STG and LTG's:  PT intervention is no longer required to meet STG/LTG.    Recommendations:  This patient is ready to be discharged from therapy and continue their home treatment program.    Please refer to the daily flowsheet for treatment today, total treatment time and time spent performing 1:1 timed codes.

## 2018-03-15 ENCOUNTER — TELEPHONE (OUTPATIENT)
Dept: PEDIATRICS | Facility: CLINIC | Age: 68
End: 2018-03-15

## 2018-03-15 DIAGNOSIS — M81.0 OSTEOPOROSIS WITHOUT CURRENT PATHOLOGICAL FRACTURE, UNSPECIFIED OSTEOPOROSIS TYPE: Primary | ICD-10-CM

## 2018-03-15 RX ORDER — RISEDRONATE SODIUM 35 MG/1
TABLET, FILM COATED ORAL
Qty: 4 TABLET | Refills: 1 | Status: SHIPPED | OUTPATIENT
Start: 2018-03-15 | End: 2018-05-22

## 2018-03-15 NOTE — TELEPHONE ENCOUNTER
Spoke with patient, relayed information from Stephany Hannon CNP. Asked for patient to let us know how the new Rx is going for her. Patient verbalized understanding. No further questions at this time. Poornima Rob RN

## 2018-03-15 NOTE — PROGRESS NOTES
Please call  Aleksandra Michel,  Your Dexa Bone Density test showed:  Osteoporosis - There appears to be significant loss of bone mineral density.       The bone density shows some  Osteoporosis.  You should be taking 0337-1223 mg of calcium with vit D.  You should be exercising regularly.  Would you consider Fosamax?   Please contact us if you have any questions.    Stephany Hannon, CNP

## 2018-03-15 NOTE — TELEPHONE ENCOUNTER
Patient given results below.  She states that she cannot take Fosamax.   She stopped taking it because she was in the ER x2 with nose bleeds.  She states the medication has a tendency to cause nose bleeds.  She thought Shelbi had mentioned there may be something else for her to take.  She then stated she would think about it.    Gwen Richmond RN,   [facility], [facility]

## 2018-03-15 NOTE — TELEPHONE ENCOUNTER
Please call  Aleksandra Michel,   Your Dexa Bone Density test showed:   Osteoporosis - There appears to be significant loss of bone mineral density.       The bone density shows some  Osteoporosis.   You should be taking 4936-9649 mg of calcium with vit D.   You should be exercising regularly.   Would you consider Fosamax?    Please contact us if you have any questions.     Stephany Hannon, CNP

## 2018-03-16 ENCOUNTER — TELEPHONE (OUTPATIENT)
Dept: PEDIATRICS | Facility: CLINIC | Age: 68
End: 2018-03-16

## 2018-03-20 NOTE — TELEPHONE ENCOUNTER
Central Prior Authorization Team   Phone: [phone]    PA Initiation    Medication: Actonel (Risedronate 35 mg)-Initiated-  Insurance Company: [employer] - Phone [phone] Fax [phone]  Pharmacy Filling the Rx: [id number] - [place], [place] - [address] [place] [address]  Filling Pharmacy Phone: [phone]  Filling Pharmacy Fax:    Start Date: 3/20/2018

## 2018-03-21 NOTE — TELEPHONE ENCOUNTER
Prior Authorization Approval    Authorization Effective Date: 3/21/2018  Authorization Expiration Date: 12/31/2018  Medication: Actonel (Risedronate 35 mg)-APPROVED  Approved Dose/Quantity:   Reference #:     Insurance Company: [employer] - Phone [phone] Fax [phone]  Expected CoPay: $36.06     CoPay Card Available:      Foundation Assistance Needed:    Which Pharmacy is filling the prescription (Not needed for infusion/clinic administered): [id number] [address] [place] [address]  Pharmacy Notified: Yes  Patient Notified: Yes

## 2018-05-14 ENCOUNTER — TELEPHONE (OUTPATIENT)
Dept: UROLOGY | Facility: CLINIC | Age: 68
End: 2018-05-14

## 2018-05-14 NOTE — TELEPHONE ENCOUNTER
"Received call from patient who reports that she has completed physical therapy and stated, \"It isn't helping much and I would like to talk about surgery.\" Patient requested to reschedule her August follow up appointment to a sooner date. Follow up appointment rescheduled for 5/22/18 at 9:30am. Patient will call with any questions in the meantime.    Patricia Singleton RN, BSN    "

## 2018-05-15 DIAGNOSIS — Z13.6 CARDIOVASCULAR SCREENING; LDL GOAL LESS THAN 160: ICD-10-CM

## 2018-05-15 DIAGNOSIS — Z13.29 SCREENING FOR THYROID DISORDER: ICD-10-CM

## 2018-05-15 DIAGNOSIS — Z00.00 ENCOUNTER FOR ROUTINE ADULT HEALTH EXAMINATION WITHOUT ABNORMAL FINDINGS: ICD-10-CM

## 2018-05-15 DIAGNOSIS — Z11.59 NEED FOR HEPATITIS C SCREENING TEST: ICD-10-CM

## 2018-05-15 DIAGNOSIS — Z13.1 SCREENING FOR DIABETES MELLITUS: ICD-10-CM

## 2018-05-15 LAB
ALBUMIN SERPL-MCNC: 3.7 G/DL (ref 3.4–5)
ALP SERPL-CCNC: 70 U/L (ref 40–150)
ALT SERPL W P-5'-P-CCNC: 20 U/L (ref 0–50)
ANION GAP SERPL CALCULATED.3IONS-SCNC: 6 MMOL/L (ref 3–14)
AST SERPL W P-5'-P-CCNC: 19 U/L (ref 0–45)
BILIRUB SERPL-MCNC: 0.5 MG/DL (ref 0.2–1.3)
BUN SERPL-MCNC: 11 MG/DL (ref 7–30)
CALCIUM SERPL-MCNC: 8.9 MG/DL (ref 8.5–10.1)
CHLORIDE SERPL-SCNC: 108 MMOL/L (ref 94–109)
CHOLEST SERPL-MCNC: 245 MG/DL
CO2 SERPL-SCNC: 28 MMOL/L (ref 20–32)
CREAT SERPL-MCNC: 0.76 MG/DL (ref 0.52–1.04)
GFR SERPL CREATININE-BSD FRML MDRD: 75 ML/MIN/1.7M2
GLUCOSE SERPL-MCNC: 87 MG/DL (ref 70–99)
HDLC SERPL-MCNC: 101 MG/DL
LDLC SERPL CALC-MCNC: 129 MG/DL
NONHDLC SERPL-MCNC: 144 MG/DL
POTASSIUM SERPL-SCNC: 4.5 MMOL/L (ref 3.4–5.3)
PROT SERPL-MCNC: 7 G/DL (ref 6.8–8.8)
SODIUM SERPL-SCNC: 142 MMOL/L (ref 133–144)
T4 FREE SERPL-MCNC: 0.96 NG/DL (ref 0.76–1.46)
TRIGL SERPL-MCNC: 75 MG/DL
TSH SERPL DL<=0.005 MIU/L-ACNC: 4.01 MU/L (ref 0.4–4)

## 2018-05-15 PROCEDURE — 36415 COLL VENOUS BLD VENIPUNCTURE: CPT | Performed by: NURSE PRACTITIONER

## 2018-05-15 PROCEDURE — 80053 COMPREHEN METABOLIC PANEL: CPT | Performed by: NURSE PRACTITIONER

## 2018-05-15 PROCEDURE — 80061 LIPID PANEL: CPT | Performed by: NURSE PRACTITIONER

## 2018-05-15 PROCEDURE — 84439 ASSAY OF FREE THYROXINE: CPT | Performed by: NURSE PRACTITIONER

## 2018-05-15 PROCEDURE — 84443 ASSAY THYROID STIM HORMONE: CPT | Performed by: NURSE PRACTITIONER

## 2018-05-15 PROCEDURE — 86803 HEPATITIS C AB TEST: CPT | Performed by: NURSE PRACTITIONER

## 2018-05-15 NOTE — LETTER
May 16, 2018      Aleksandra Michel                                                                [address] [facility] [address]          Dear Aleksandra,    The results of your recent   -Liver and gallbladder tests are normal. (ALT,AST, Alk phos, bilirubin), kidney function is normal (Cr, GFR), Sodium is normal, Potassium is normal, Calcium is normal, Glucose is normal (diabetes screening test).   -LDL(bad) cholesterol level is elevated,  A diet high in fat and simple carbohydrates, genetics and being overweight can contribute to this. ADVISE: Exercise, a low fat, low carbohydrate diet and weight control are helpful to improve this.  Rechecking your fasting cholesterol panel in 12 months is recommended (Lipid w/ LDL reflex, DX:hyperlipidemia)  -TSH (thyroid stimulating hormone) level can be a sign of hypothyroidism (an underactive thyroid).  This can cause a number of symptoms including weight gain, fatigue, high cholesterol, constipation, hair loss, cold intolerance).  ADVISE:rechecking TSH in 1 month. (TSH w/ reflex T4)  -Hepatitis C antibody screen test shows no signs of a previous hepatitis C infection.    Results for orders placed or performed in visit on 05/15/18   Lipid panel reflex to direct LDL Fasting   Result Value Ref Range    Cholesterol 245 (H) <200 mg/dL    Triglycerides 75 <150 mg/dL    HDL Cholesterol 101 >49 mg/dL    LDL Cholesterol Calculated 129 (H) <100 mg/dL    Non HDL Cholesterol 144 (H) <130 mg/dL   Comprehensive metabolic panel   Result Value Ref Range    Sodium 142 133 - 144 mmol/L    Potassium 4.5 3.4 - 5.3 mmol/L    Chloride 108 94 - 109 mmol/L    Carbon Dioxide 28 20 - 32 mmol/L    Anion Gap 6 3 - 14 mmol/L    Glucose 87 70 - 99 mg/dL    Urea Nitrogen 11 7 - 30 mg/dL    Creatinine 0.76 0.52 - 1.04 mg/dL    GFR Estimate 75 >60 mL/min/1.7m2    GFR Estimate If Black >90 >60 mL/min/1.7m2    Calcium 8.9 8.5 - 10.1 mg/dL    Bilirubin Total 0.5 0.2 - 1.3 mg/dL    Albumin 3.7 3.4 -  5.0 g/dL    Protein Total 7.0 6.8 - 8.8 g/dL    Alkaline Phosphatase 70 40 - 150 U/L    ALT 20 0 - 50 U/L    AST 19 0 - 45 U/L   TSH with free T4 reflex   Result Value Ref Range    TSH 4.01 (H) 0.40 - 4.00 mU/L   Hepatitis C Screen Reflex to HCV RNA Quant and Genotype   Result Value Ref Range    Hepatitis C Antibody Nonreactive NR^Nonreactive   T4 free   Result Value Ref Range    T4 Free 0.96 0.76 - 1.46 ng/dL       Please make a follow-up appointment if you have additional questions.    Sincerely,     Stephany Hannon, OLINDA, APRN CNP

## 2018-05-16 LAB — HCV AB SERPL QL IA: NONREACTIVE

## 2018-05-22 ENCOUNTER — OFFICE VISIT (OUTPATIENT)
Dept: UROLOGY | Facility: CLINIC | Age: 68
End: 2018-05-22
Payer: COMMERCIAL

## 2018-05-22 VITALS
RESPIRATION RATE: 18 BRPM | TEMPERATURE: 97 F | DIASTOLIC BLOOD PRESSURE: 74 MMHG | OXYGEN SATURATION: 98 % | SYSTOLIC BLOOD PRESSURE: 110 MMHG | HEART RATE: 67 BPM

## 2018-05-22 DIAGNOSIS — N95.2 ATROPHIC VAGINITIS: Primary | ICD-10-CM

## 2018-05-22 DIAGNOSIS — R32 URINARY INCONTINENCE, UNSPECIFIED TYPE: ICD-10-CM

## 2018-05-22 DIAGNOSIS — M62.89 PELVIC FLOOR DYSFUNCTION: ICD-10-CM

## 2018-05-22 PROCEDURE — 87109 MYCOPLASMA: CPT | Performed by: UROLOGY

## 2018-05-22 PROCEDURE — 99214 OFFICE O/P EST MOD 30 MIN: CPT | Performed by: UROLOGY

## 2018-05-22 ASSESSMENT — PAIN SCALES - GENERAL: PAINLEVEL: NO PAIN (0)

## 2018-05-22 NOTE — MR AVS SNAPSHOT
After Visit Summary   5/22/2018    Aleksandra Michel    MRN: [id number]           Patient Information     Date Of Birth          1950        Visit Information        Provider Department      5/22/2018 9:30 AM Pricila Liang MD [facility]        Today's Diagnoses     Atrophic vaginitis    -  1    Urinary incontinence, unspecified type        Pelvic floor dysfunction          Care Instructions    Websites with free information:    American Urogynecologic Society patient website: www.[email or web address]    Total Control Program: www.totalcontrolprogram.com    Please do the bladder testing (urodynamics testing) and the return to see me.  Please do a 3 day bladder diary prior to the urodynamics-this will be scheduled at my [place] office.  They should contact you to schedule this but if you do not hear anything by later next week please call [phone]    Please return for a cystoscopy (procedure to look in the bladder) and pelvic exam after the urodynamics.  This can be done at any of my offices    It was a pleasure meeting with you today.  Thank you for allowing me and my team the privilege of caring for you today.  YOU are the reason we are here, and I truly hope we provided you with the excellent service you deserve.  Please let us know if there is anything else we can do for you so that we can be sure you are leaving completely satisfied with your care experience.    Cystoscopy    Cystoscopy is a procedure that lets your doctor look directly inside your urethra and bladder. It can be used to:    Help diagnose a problem with your urethra, bladder, or kidneys.    Take a sample (biopsy) of bladder or urethral tissue.    Treat certain problems (such as removing kidney stones).    Place a stent to bypass an obstruction.    Take special X-rays of the kidneys.  Based on the findings, your doctor may recommend other tests or treatments.  What is a cystoscope?  A cystoscope is a  telescope-like instrument that contains lenses and fiberoptics (small glass wires that make bright light). The cystoscope may be straight and rigid, or flexible to bend around curves in the urethra. The doctor may look directly into the cystoscope, or project the image onto a monitor.  Getting ready    Ask your doctor if you should stop taking any medicines before the procedure.    Ask whether you should avoid eating or drinking anything after midnight before the procedure.    Follow any other instructions your doctor gives you.  Tell your doctor before the exam if you:    Take any medicines, such as aspirin or blood thinners    Have allergies to any medicines    Are pregnant   The procedure  Cystoscopy is done in the doctor s office, surgery center, or hospital. The doctor and a nurse are present during the procedure. It takes only a few minutes, longer if a biopsy, X-ray, or treatment needs to be done.  During the procedure:    You lie on an exam table on your back, knees bent and legs apart. You are covered with a drape.    Your urethra and the area around it are washed. Anesthetic jelly may be applied to numb the urethra. Other pain medicine is usually not needed. In some cases, you may be offered a mild sedative to help you relax. If a more extensive procedure is to be done, such as a biopsy or kidney stone removal, general anesthesia may be needed.    The cystoscope is inserted. A sterile fluid is put into the bladder to expand it. You may feel pressure from this fluid.    When the procedure is done, the cystoscope is removed.  After the procedure  If you had a sedative, general anesthesia, or spinal anesthesia, you must have someone drive you home. Once you re home:    Drink plenty of fluids.    You may have burning or light bleeding when you urinate--this is normal.    Medicines may be prescribed to ease any discomfort or prevent infection. Take these as directed.    Call your doctor if you have heavy  bleeding or blood clots, burning that lasts more than a day, a fever over 100 F  (38  C), or trouble urinating.  Date Last Reviewed: 1/1/2017 2000-2017 [employer]. [address] [place] [address], [place] [address]. All rights reserved. This information is not intended as a substitute for professional medical care. Always follow your healthcare professional's instructions.              Follow-ups after your visit        Future tests that were ordered for you today     Open Future Orders        Priority Expected Expires Ordered    Routine UA with micro reflex to culture Routine  5/22/2019 5/22/2018            Who to contact     If you have questions or need follow up information about today's clinic visit or your schedule please contact [facility] directly at [phone].  Normal or non-critical lab and imaging results will be communicated to you by MyChart, letter or phone within 4 business days after the clinic has received the results. If you do not hear from us within 7 days, please contact the clinic through MyChart or phone. If you have a critical or abnormal lab result, we will notify you by phone as soon as possible.  Submit refill requests through [employer] or call your pharmacy and they will forward the refill request to us. Please allow 3 business days for your refill to be completed.          Additional Information About Your Visit        [employer] Information     [employer] is an electronic gateway that provides easy, online access to your medical records. With [employer], you can request a clinic appointment, read your test results, renew a prescription or communicate with your care team.     To sign up for [employer] visit the website at [email or web address]   You will be asked to enter the access code listed below, as well as some personal information. Please follow the directions to create your username and password.     Your access code is: [email or web address]  Expires: 8/20/2018  10:32 AM     Your access code will  in 90 days. If you need help or a new code, please contact your [facility] or call [phone] for assistance.        Care EveryWhere ID     This is your Care EveryWhere ID. This could be used by other organizations to access your [place] medical records  [id number]        Your Vitals Were     Pulse Temperature Respirations Pulse Oximetry          67 97  F (36.1  C) (Oral) 18 98%         Blood Pressure from Last 3 Encounters:   18 110/74   18 100/50   18 104/74    Weight from Last 3 Encounters:   18 59.1 kg (130 lb 3.2 oz)   18 59.4 kg (131 lb)   17 58.5 kg (128 lb 14.4 oz)              We Performed the Following     Mycoplasma large colony culture     Ureaplasma culture          Today's Medication Changes          These changes are accurate as of 18 10:32 AM.  If you have any questions, ask your nurse or doctor.               Start taking these medicines.        Dose/Directions    COMPOUNDED NON-CONTROLLED SUBSTANCE - PHARMACY TO MIX COMPOUNDED MEDICATION   Commonly known as:  CMPD RX   Used for:  Atrophic vaginitis   Started by:  Pricila Liang MD        estrodiol 1mg /gram-use dime size amount to the vagina 2-3 x a week at night   Quantity:  50 g   Refills:  3            Where to get your medicines      These medications were sent to  [facility], [place] [address] [facility], [address]  [address] [facility], [facility] [address] [address]     Phone:  [phone]     COMPOUNDED NON-CONTROLLED SUBSTANCE - PHARMACY TO MIX COMPOUNDED MEDICATION                Primary Care Provider Office Phone # Fax #    Stephany VARSHA Russell [phone]       [address]  [place] [address]        Equal Access to Services     MIRELLA DOMINGUEZ AH: Martha adriano Sojohana, waaxda luqadaha, qaybta kaalmada adeegyada, lisa velez . So [facility]  [phone].    ATENCIÓN: Si parisa escobar, tiene a awad disposición servicios gratuitos de asistencia lingüística. Yoon riley [phone].    We comply with applicable federal civil rights laws and [place] laws. We do not discriminate on the basis of race, color, national origin, age, disability, sex, sexual orientation, or gender identity.            Thank you!     Thank you for choosing [facility]  for your care. Our goal is always to provide you with excellent care. Hearing back from our patients is one way we can continue to improve our services. Please take a few minutes to complete the written survey that you may receive in the mail after your visit with us. Thank you!             Your Updated Medication List - Protect others around you: Learn how to safely use, store and throw away your medicines at www.disposemymeds.org.          This list is accurate as of 5/22/18 10:32 AM.  Always use your most recent med list.                   Brand Name Dispense Instructions for use Diagnosis    citalopram 10 MG tablet    celeXA    90 tablet    TAKE 1 TABLET (10 MG) BY MOUTH DAILY    Situational depression       COMPOUNDED NON-CONTROLLED SUBSTANCE - PHARMACY TO MIX COMPOUNDED MEDICATION    CMPD RX    50 g    estrodiol 1mg /gram-use dime size amount to the vagina 2-3 x a week at night    Atrophic vaginitis       DAILY MULTIVITAMIN PO      Take 1 tablet by mouth daily.        PRESERVISION/LUTEIN PO      Take 1 tablet by mouth daily.        UNABLE TO FIND      MEDICATION NAME: [facility] G[facility]  Liquid calcium        UNKNOWN TO PATIENT      Hormone cream from Gyno-uses twice per week        vitamin B complex with vitamin C Tabs tablet      Take 1 tablet by mouth daily

## 2018-05-22 NOTE — PATIENT INSTRUCTIONS
Websites with free information:    American Urogynecologic Society patient website: www.[email or web address]    Total Control Program: www.totalcontrolprogram.com    Please do the bladder testing (urodynamics testing) and the return to see me.  Please do a 3 day bladder diary prior to the urodynamics-this will be scheduled at my [place] office.  They should contact you to schedule this but if you do not hear anything by later next week please call [phone]    Please return for a cystoscopy (procedure to look in the bladder) and pelvic exam after the urodynamics.  This can be done at any of my offices    It was a pleasure meeting with you today.  Thank you for allowing me and my team the privilege of caring for you today.  YOU are the reason we are here, and I truly hope we provided you with the excellent service you deserve.  Please let us know if there is anything else we can do for you so that we can be sure you are leaving completely satisfied with your care experience.    Cystoscopy    Cystoscopy is a procedure that lets your doctor look directly inside your urethra and bladder. It can be used to:    Help diagnose a problem with your urethra, bladder, or kidneys.    Take a sample (biopsy) of bladder or urethral tissue.    Treat certain problems (such as removing kidney stones).    Place a stent to bypass an obstruction.    Take special X-rays of the kidneys.  Based on the findings, your doctor may recommend other tests or treatments.  What is a cystoscope?  A cystoscope is a telescope-like instrument that contains lenses and fiberoptics (small glass wires that make bright light). The cystoscope may be straight and rigid, or flexible to bend around curves in the urethra. The doctor may look directly into the cystoscope, or project the image onto a monitor.  Getting ready    Ask your doctor if you should stop taking any medicines before the procedure.    Ask whether you should avoid eating or drinking anything after  midnight before the procedure.    Follow any other instructions your doctor gives you.  Tell your doctor before the exam if you:    Take any medicines, such as aspirin or blood thinners    Have allergies to any medicines    Are pregnant   The procedure  Cystoscopy is done in the doctor s office, surgery center, or hospital. The doctor and a nurse are present during the procedure. It takes only a few minutes, longer if a biopsy, X-ray, or treatment needs to be done.  During the procedure:    You lie on an exam table on your back, knees bent and legs apart. You are covered with a drape.    Your urethra and the area around it are washed. Anesthetic jelly may be applied to numb the urethra. Other pain medicine is usually not needed. In some cases, you may be offered a mild sedative to help you relax. If a more extensive procedure is to be done, such as a biopsy or kidney stone removal, general anesthesia may be needed.    The cystoscope is inserted. A sterile fluid is put into the bladder to expand it. You may feel pressure from this fluid.    When the procedure is done, the cystoscope is removed.  After the procedure  If you had a sedative, general anesthesia, or spinal anesthesia, you must have someone drive you home. Once you re home:    Drink plenty of fluids.    You may have burning or light bleeding when you urinate--this is normal.    Medicines may be prescribed to ease any discomfort or prevent infection. Take these as directed.    Call your doctor if you have heavy bleeding or blood clots, burning that lasts more than a day, a fever over 100 F  (38  C), or trouble urinating.  Date Last Reviewed: 1/1/2017 2000-2017 [employer]. [address] [place] [address], [place] [address]. All rights reserved. This information is not intended as a substitute for professional medical care. Always follow your healthcare professional's instructions.

## 2018-05-22 NOTE — Clinical Note
Hiral Antony Max, Melissa  Urodynamics-preferably beginning of June in [place].  Then follow up  afterwards to see me for cystoscopy (at any of my locations)  Thanks!

## 2018-05-22 NOTE — PROGRESS NOTES
May 22, 2018    Return visit    Patient returns today for follow up.  Daily leakage moderate amount that is bothersome and she wants surgery for this.  She cannot quite tell if leakage is with stress or urge.  She just states that she is sick of the leakage.  Uses estrogen/testosterone cream for atrophic vaginitis and asks for a refill.  She asks about prolapse surgery as she states that she is not able to insert her vaginal estrogen cream applicator very far in.  She denies any changes in her health since last visit.    /74 (Cuff Size: Adult Regular)  Pulse 67  Temp 97  F (36.1  C) (Oral)  Resp 18  SpO2 98%  She is comfortable, in no distress, non-labored breathing.      PVR 79 mL by bladder scan    stage I vaginal vault prolapse, small volume urinary incontinence without sensory awareness, myofascial tenderness of the pelvic floor, pelvic floor dysfunction    A/P: 68 year old F with stage I vaginal vault prolapse, urinary incontinence without sensory awareness, pelvic floor dysfunction, atrophic vaginitis    Ordered vaginal estrogen cream for her.  Discussed that her total vaginal length is shortened given her prior surgery and she does not have significant prolapse    We discussed the etiologies of stress and urge incontinence and how they differ. We discussed that the options of treating stress incontinence to include observation, weight loss, pelvic floor physical therapy, incontinence pessary, urethral bulking agents, and surgical correction most commonly with midurethral sling or autologous rectus fascial sling. We then discussed that urge incontinence treatments include observation, weight loss, medications most commonly anticholinergics, physical therapy, biofeedback, intravesical botulinum toxin, percutaneous tibial nerve stimulation and sacral neuromodulation.    At this time as we have not been able to really understand her urine leakage will plan for UDS and then follow up afterwards for  cystoscopy.  Will try to arrange for UDS in my [place] clinic    RTC for likely cystoscopy after UDS    25 minutes were spent with the patient today, > 50% in counseling and coordination of care    Pricila Liang MD MPH   of Urology    CC  Patient Care Team:  Stephany Hannon APRN CNP as PCP - General (Family Practice)

## 2018-05-22 NOTE — NURSING NOTE
Aleksandra Michel's goals for this visit include:   Chief Complaint   Patient presents with     RECHECK     prolapse, discuss surgery       She requests these members of her care team be copied on today's visit information: yes, PCP    PCP: Stephany Hannon    Referring Provider:  No referring provider defined for this encounter.    /74 (Cuff Size: Adult Regular)  Pulse 67  Temp 97  F (36.1  C) (Oral)  Resp 18  SpO2 98%    Do you need any medication refills at today's visit? Yes, estradiol/testosterone vaginal cream- please send to [facility] in [place]    Patricia Singleton RN, BSN

## 2018-05-30 LAB
BACTERIA SPEC CULT: NORMAL
BACTERIA SPEC CULT: NORMAL
Lab: NORMAL
Lab: NORMAL
SPECIMEN SOURCE: NORMAL
SPECIMEN SOURCE: NORMAL

## 2018-06-11 ENCOUNTER — OFFICE VISIT (OUTPATIENT)
Dept: UROLOGY | Facility: CLINIC | Age: 68
End: 2018-06-11
Payer: COMMERCIAL

## 2018-06-11 DIAGNOSIS — R32 URINARY INCONTINENCE, UNSPECIFIED TYPE: ICD-10-CM

## 2018-06-11 DIAGNOSIS — R32 URINARY INCONTINENCE, UNSPECIFIED TYPE: Primary | ICD-10-CM

## 2018-06-11 LAB
ALBUMIN UR-MCNC: NEGATIVE MG/DL
APPEARANCE UR: CLEAR
BILIRUB UR QL STRIP: NEGATIVE
COLOR UR AUTO: YELLOW
GLUCOSE UR STRIP-MCNC: NEGATIVE MG/DL
HGB UR QL STRIP: NEGATIVE
KETONES UR STRIP-MCNC: NEGATIVE MG/DL
LEUKOCYTE ESTERASE UR QL STRIP: NEGATIVE
NITRATE UR QL: NEGATIVE
PH UR STRIP: 7 PH (ref 5–7)
SOURCE: NORMAL
SP GR UR STRIP: 1.01 (ref 1–1.03)
UROBILINOGEN UR STRIP-ACNC: 0.2 EU/DL (ref 0.2–1)

## 2018-06-11 PROCEDURE — 51797 INTRAABDOMINAL PRESSURE TEST: CPT

## 2018-06-11 PROCEDURE — 51784 ANAL/URINARY MUSCLE STUDY: CPT

## 2018-06-11 PROCEDURE — 51741 ELECTRO-UROFLOWMETRY FIRST: CPT

## 2018-06-11 PROCEDURE — 81003 URINALYSIS AUTO W/O SCOPE: CPT | Performed by: UROLOGY

## 2018-06-11 PROCEDURE — 51728 CYSTOMETROGRAM W/VP: CPT

## 2018-06-11 RX ORDER — CIPROFLOXACIN 500 MG/1
500 TABLET, FILM COATED ORAL ONCE
Qty: 1 TABLET | Refills: 0 | Status: SHIPPED | OUTPATIENT
Start: 2018-06-11 | End: 2018-06-11

## 2018-06-11 NOTE — PROGRESS NOTES
Aleksandracan Michel comes into clinic today at the request of Dr Liang Ordering Provider for urodynamics.          This service provided today was under the supervising provider of the day Dr Russo, who was available if needed.  Here for UDS. Test performed (see separate printout)   One Cipro 500 mg RX sent to take today . Patient will see DR Liang for a cystoscopy and test results in near future.    Hiral León LPN

## 2018-06-11 NOTE — MR AVS SNAPSHOT
"              After Visit Summary   6/11/2018    Aleksandra Michel    MRN: [id number]           Patient Information     Date Of Birth          1950        Visit Information        Provider Department      6/11/2018 9:00 AM UA NURSE [facility]        Today's Diagnoses     Urinary incontinence, unspecified type           Follow-ups after your visit        Your next 10 appointments already scheduled     Jul 18, 2018 10:30 AM CDT   Cystoscopy with Pricila Liang MD, UA CYF   [facility] ([facility])    [address]  [address]  [facility] [id number] [phone]              Who to contact     If you have questions or need follow up information about today's clinic visit or your schedule please contact [facility] directly at [phone].  Normal or non-critical lab and imaging results will be communicated to you by MyChart, letter or phone within 4 business days after the clinic has received the results. If you do not hear from us within 7 days, please contact the clinic through JumpInhart or phone. If you have a critical or abnormal lab result, we will notify you by phone as soon as possible.  Submit refill requests through PictureMe Universe or call your pharmacy and they will forward the refill request to us. Please allow 3 business days for your refill to be completed.          Additional Information About Your Visit        MyChart Information     PictureMe Universe lets you send messages to your doctor, view your test results, renew your prescriptions, schedule appointments and more. To sign up, go to [email or web address] . Click on \"Log in\" on the left side of the screen, which will take you to the Welcome page. Then click on \"Sign up Now\" on the right side of the page.     You will be asked to enter the access code listed below, as well as some personal information. Please follow the " directions to create your username and password.     Your access code is: [email or web address]  Expires: 2018 10:32 AM     Your access code will  in 90 days. If you need help or a new code, please call your [facility] clinic or [phone].        Care EveryWhere ID     This is your Care EveryWhere ID. This could be used by other organizations to access your [facility] medical records  [id number]         Blood Pressure from Last 3 Encounters:   18 110/74   18 100/50   18 104/74    Weight from Last 3 Encounters:   18 59.1 kg (130 lb 3.2 oz)   18 59.4 kg (131 lb)   17 58.5 kg (128 lb 14.4 oz)              We Performed the Following     UA without Microscopic          Today's Medication Changes          These changes are accurate as of 18 10:09 AM.  If you have any questions, ask your nurse or doctor.               Start taking these medicines.        Dose/Directions    ciprofloxacin 500 MG tablet   Commonly known as:  CIPRO   Used for:  Urinary incontinence, unspecified type        Dose:  500 mg   Take 1 tablet (500 mg) by mouth once for 1 dose   Quantity:  1 tablet   Refills:  0            Where to get your medicines      These medications were sent to [facility] - [address]  [address] [facility] [address] [facility] [address], [place] [address]     Phone:  [phone]     ciprofloxacin 500 MG tablet                Primary Care Provider Office Phone # Fax #    Stephany Elizabeth Hannon, APRN [facility] [phone]       [address]  [place] [address]        Equal Access to Services     DONALD DOMINGUEZ : Hadkatharina Lizarraga, walakishada cristiana, qaybta kaallisa kee. So [facility] [phone].    ATENCIÓN: Si habla español, tiene a awad disposición servicios gratuitos de asistencia lingüística. Yoon al [phone].    We comply with applicable federal civil rights laws and [place] laws. We do not  discriminate on the basis of race, color, national origin, age, disability, sex, sexual orientation, or gender identity.            Thank you!     Thank you for choosing [facility]  for your care. Our goal is always to provide you with excellent care. Hearing back from our patients is one way we can continue to improve our services. Please take a few minutes to complete the written survey that you may receive in the mail after your visit with us. Thank you!             Your Updated Medication List - Protect others around you: Learn how to safely use, store and throw away your medicines at www.disposemymeds.org.          This list is accurate as of 6/11/18 10:09 AM.  Always use your most recent med list.                   Brand Name Dispense Instructions for use Diagnosis    ciprofloxacin 500 MG tablet    CIPRO    1 tablet    Take 1 tablet (500 mg) by mouth once for 1 dose    Urinary incontinence, unspecified type       citalopram 10 MG tablet    celeXA    90 tablet    TAKE 1 TABLET (10 MG) BY MOUTH DAILY    Situational depression       COMPOUNDED NON-CONTROLLED SUBSTANCE - PHARMACY TO MIX COMPOUNDED MEDICATION    CMPD RX    50 g    estrodiol 1mg /gram-use dime size amount to the vagina 2-3 x a week at night    Atrophic vaginitis       DAILY MULTIVITAMIN PO      Take 1 tablet by mouth daily.        PRESERVISION/LUTEIN PO      Take 1 tablet by mouth daily.        UNABLE TO FIND      MEDICATION NAME: Beyond Osteo Young Gevity  Liquid calcium        UNKNOWN TO PATIENT      Hormone cream from Gyno-uses twice per week        vitamin B complex with vitamin C Tabs tablet      Take 1 tablet by mouth daily

## 2018-07-18 ENCOUNTER — OFFICE VISIT (OUTPATIENT)
Dept: UROLOGY | Facility: CLINIC | Age: 68
End: 2018-07-18
Payer: COMMERCIAL

## 2018-07-18 VITALS
SYSTOLIC BLOOD PRESSURE: 104 MMHG | DIASTOLIC BLOOD PRESSURE: 68 MMHG | HEIGHT: 63 IN | BODY MASS INDEX: 23.04 KG/M2 | WEIGHT: 130 LBS

## 2018-07-18 DIAGNOSIS — N32.9 LESION OF BLADDER: ICD-10-CM

## 2018-07-18 DIAGNOSIS — R32 URINARY INCONTINENCE, UNSPECIFIED TYPE: Primary | ICD-10-CM

## 2018-07-18 DIAGNOSIS — Z98.890 H/O PELVIC SURGERY: ICD-10-CM

## 2018-07-18 LAB
ALBUMIN UR-MCNC: NEGATIVE MG/DL
APPEARANCE UR: CLEAR
BILIRUB UR QL STRIP: NEGATIVE
COLOR UR AUTO: YELLOW
GLUCOSE UR STRIP-MCNC: NEGATIVE MG/DL
HGB UR QL STRIP: NEGATIVE
KETONES UR STRIP-MCNC: NEGATIVE MG/DL
LEUKOCYTE ESTERASE UR QL STRIP: NEGATIVE
NITRATE UR QL: NEGATIVE
PH UR STRIP: 7 PH (ref 5–7)
SOURCE: NORMAL
SP GR UR STRIP: 1.02 (ref 1–1.03)
UROBILINOGEN UR STRIP-ACNC: 0.2 EU/DL (ref 0.2–1)

## 2018-07-18 PROCEDURE — 81003 URINALYSIS AUTO W/O SCOPE: CPT | Performed by: UROLOGY

## 2018-07-18 PROCEDURE — 88112 CYTOPATH CELL ENHANCE TECH: CPT | Performed by: UROLOGY

## 2018-07-18 PROCEDURE — 52000 CYSTOURETHROSCOPY: CPT | Performed by: UROLOGY

## 2018-07-18 RX ORDER — CEFAZOLIN SODIUM 1 G
1 VIAL (EA) INJECTION SEE ADMIN INSTRUCTIONS
Status: CANCELLED | OUTPATIENT
Start: 2018-07-18 | End: 2019-07-18

## 2018-07-18 ASSESSMENT — PAIN SCALES - GENERAL: PAINLEVEL: NO PAIN (0)

## 2018-07-18 NOTE — NURSING NOTE
Invasive Procedure Safety Checklist:    Procedure:     Action: Complete sections and checkboxes as appropriate.    Pre-procedure:  1. Patient ID Verified with 2 identifiers (Sheree and  or MRN) : YES    2. Procedure and site verified with patient/designee (when able) : YES    3. Accurate consent documentation in medical record : YES    4. H&P (or appropriate assessment) documented in medical record : YES  H&P must be up to 30 days prior to procedure an updated within 24 hours of                 Procedure as applicable.     5. Relevant diagnostic and radiology test results appropriately labeled and displayed as applicable : YES    6. Blood products, implants, devices, and/or special equipment available for the procedure as applicable : YES    7. Procedure site(s) marked with provider initials [Exclusions: N/A] : NO    8. Marking not required. Reason : Yes  Procedure does not require site marking    Time Out:     Time-Out performed immediately prior to starting procedure, including verbal and active participation of all team members addressing: YES    1. Correct patient identity.  2. Confirmed that the correct side and site are marked.  3. An accurate procedure to be done.  4. Agreement on the procedure to be done.  5. Correct patient position.  6. Relevant images and results are properly labeled and appropriately displayed.  7. The need to administer antibiotics or fluids for irrigation purposes during the procedure as applicable.  8. Safety precautions based on patient history or medication use.    During Procedure: Verification of correct person, site, and procedure occurs any time the responsibility for care of the patient is transferred to another member of the care team.    The following medication was given:     MEDICATION: Lidocaine jelly 2%  ROUTE: Topical  SITE: Urethra via meatus   DOSE: 5 mL  LOT #: [id number]  :  MediKeeper LImited  EXPIRATION DATE:    NDC#: [address]-0

## 2018-07-18 NOTE — MR AVS SNAPSHOT
"              After Visit Summary   7/18/2018    Aleksandra Michel    MRN: [id number]           Patient Information     Date Of Birth          1950        Visit Information        Provider Department      7/18/2018 10:30 AM Pricila Liang MD; UA CYF [facility] [place]        Today's Diagnoses     Urinary incontinence, unspecified type    -  1    H/O pelvic surgery        Lesion of bladder          Care Instructions    Please come to see me at the [facility] on a Thursday in August for a pessary fitting.  We will work you in but please know that you may have to wait  [facility] [address] [place] [address].  Urology clinic is 4A    We will let you know what the urine test shows    If you decide you want to proceed with a biopsy in the OR please contact my surgery schedulers Alissa or Thi [phone] or Maria Elena my care coordinator [phone]    AFTER YOUR CYSTOSCOPY        You have just completed a cystoscopy, or \"cysto\", which allowed your physician to learn more about your bladder (or to remove a stent placed after surgery). We suggest that you continue to avoid caffeine, fruit juice, and alcohol for the next 24 hours, however, you are encouraged to return to your normal activities.         A few things that are considered normal after your cystoscopy:     * Small amount of bleeding (or spotting) that clears within the next 24 hours     * Slight burning sensation with urination     * Sensation to of needing to avoid more frequently     * The feeling of \"air\" in your urine     * Mild discomfort that is relieved with Tylenol        Please contact our office promptly if you:     * Develop a fever above 101 degrees     * Are unable to urinate     * Develop bright red blood that does not stop     * Severe pain or swelling         Please contact our office with any concerns or questions @[phone]          Follow-ups after your visit        Your next " "10 appointments already scheduled     Aug 09, 2018  1:15 PM CDT   (Arrive by 1:00 PM)   Return Visit with Pricila Liang MD   [facility] ([facility])    [address] [address] [phone]              Who to contact     If you have questions or need follow up information about today's clinic visit or your schedule please contact [facility] directly at [phone].  Normal or non-critical lab and imaging results will be communicated to you by Edventureshart, letter or phone within 4 business days after the clinic has received the results. If you do not hear from us within 7 days, please contact the clinic through Edventureshart or phone. If you have a critical or abnormal lab result, we will notify you by phone as soon as possible.  Submit refill requests through SplashMaps or call your pharmacy and they will forward the refill request to us. Please allow 3 business days for your refill to be completed.          Additional Information About Your Visit        MyChart Information     SplashMaps lets you send messages to your doctor, view your test results, renew your prescriptions, schedule appointments and more. To sign up, go to [email or web address] . Click on \"Log in\" on the left side of the screen, which will take you to the Welcome page. Then click on \"Sign up Now\" on the right side of the page.     You will be asked to enter the access code listed below, as well as some personal information. Please follow the directions to create your username and password.     Your access code is: [email or web address]  Expires: 2018 10:32 AM     Your access code will  in 90 days. If you need help or a new code, please call your [facility] or [phone].        Care EveryWhere ID     This is your Care EveryWhere ID. This could be used by other organizations to access your " "[facility] medical records  [id number]        Your Vitals Were     Height BMI (Body Mass Index)                1.594 m (5' 2.75\") 23.21 kg/m2           Blood Pressure from Last 3 Encounters:   07/18/18 104/68   05/22/18 110/74   03/01/18 100/50    Weight from Last 3 Encounters:   07/18/18 59 kg (130 lb)   03/01/18 59.1 kg (130 lb 3.2 oz)   02/13/18 59.4 kg (131 lb)              We Performed the Following     CYSTOURETHROSCOPY     Cytology non gyn [[id number]]     Bertha-Operative Worksheet  (Urology General)     UA without Microscopic        Primary Care Provider Office Phone # Fax #    Stephany Hannon, APRN [phone]       [address]  [place] [address]        Equal Access to Services     MIRELLA DOMINGUEZ : Hadii aad ku hadasho Soomaali, waaxda luqadaha, qaybta kaalmada adeegyada, waxay idiin hayaan shan kharaveto velez . So [facility] [phone].    ATENCIÓN: Si habla español, tiene a awad disposición servicios gratuitos de asistencia lingüística. Shannoname al [phone].    We comply with applicable federal civil rights laws and [place] laws. We do not discriminate on the basis of race, color, national origin, age, disability, sex, sexual orientation, or gender identity.            Thank you!     Thank you for choosing [facility] [place]  for your care. Our goal is always to provide you with excellent care. Hearing back from our patients is one way we can continue to improve our services. Please take a few minutes to complete the written survey that you may receive in the mail after your visit with us. Thank you!             Your Updated Medication List - Protect others around you: Learn how to safely use, store and throw away your medicines at www.disposemymeds.org.          This list is accurate as of 7/18/18 11:58 AM.  Always use your most recent med list.                   Brand Name Dispense Instructions for use Diagnosis    citalopram 10 MG tablet "    celeXA    90 tablet    TAKE 1 TABLET (10 MG) BY MOUTH DAILY    Situational depression       COMPOUNDED NON-CONTROLLED SUBSTANCE - PHARMACY TO MIX COMPOUNDED MEDICATION    CMPD RX    50 g    estrodiol 1mg /gram-use dime size amount to the vagina 2-3 x a week at night    Atrophic vaginitis       DAILY MULTIVITAMIN PO      Take 1 tablet by mouth daily.        PRESERVISION/LUTEIN PO      Take 1 tablet by mouth daily.        UNABLE TO FIND      MEDICATION NAME: Beyond Osteo Young Gevity  Liquid calcium        UNKNOWN TO PATIENT      Hormone cream from Gyno-uses twice per week        vitamin B complex with vitamin C Tabs tablet      Take 1 tablet by mouth daily

## 2018-07-18 NOTE — PATIENT INSTRUCTIONS
"Please come to see me at the [facility] on a Thursday in August for a pessary fitting.  We will work you in but please know that you may have to wait  [facility] [address] [place] [address].  Urology clinic is 4A    We will let you know what the urine test shows    If you decide you want to proceed with a biopsy in the OR please contact my surgery schedulers Alissa or Thi [phone] or Maria Elena my care coordinator [phone]    AFTER YOUR CYSTOSCOPY        You have just completed a cystoscopy, or \"cysto\", which allowed your physician to learn more about your bladder (or to remove a stent placed after surgery). We suggest that you continue to avoid caffeine, fruit juice, and alcohol for the next 24 hours, however, you are encouraged to return to your normal activities.         A few things that are considered normal after your cystoscopy:     * Small amount of bleeding (or spotting) that clears within the next 24 hours     * Slight burning sensation with urination     * Sensation to of needing to avoid more frequently     * The feeling of \"air\" in your urine     * Mild discomfort that is relieved with Tylenol        Please contact our office promptly if you:     * Develop a fever above 101 degrees     * Are unable to urinate     * Develop bright red blood that does not stop     * Severe pain or swelling         Please contact our office with any concerns or questions @[phone]  "

## 2018-07-18 NOTE — PROGRESS NOTES
"July 18, 2018    Return visit    Patient returns today for follow up. She denies any changes in her health since last visit.    Voiding diary shows she urinates between 1 and 14 oz at a time.  Her ins and outs seem to match with about 50oz fluid intake and 48 oz output with a few drops of urine leakage noted.    Ht 1.594 m (5' 2.75\")  Wt 59 kg (130 lb)  BMI 23.21 kg/m2  She is comfortable, in no distress, non-labored breathing.  Abdomen is soft, non-tender, non-distended.  Normal external female genitalia, there is a small urethral caruncle noted.  Negative CST.  Pelvic exam is remarkable for TVL 4    Cystoscopy Note: After informed consent was obtained patient was prepped and draped in the standard fashion.  The flexible cystoscope was inserted into a normal appearing urethral meatus.  The urothelium was carefully examined and there was fine irregularity to the bladder along patient's left lateral wall but there were no obvious tumors, masses, stones, foreign bodies, or other urothelial abnormalities noted.  Bilateral ureteral orifices were noted in the normal orthotopic position and both effluxed clear urine.  The cystoscope was retroflexed and the bladder neck was unremarkable.  The urethra was carefully examined upon removing the cystoscope and was unremarkable.  Patient tolerated the procedure without complications noted.      Urodynamics reviewed:  First desire 272 mL, Strong desire 352mL, senior care 475 mL.  She has normal compliance.  There was no USI, DO/DOI.  Uroflow shows void of 548.5mL with a Qmax 23.4 a Pdet max 1, with a post test PVR of 0mL    A/P: 68 year old F with stage I vaginal vault prolapse, small volume urinary incontinence without sensory awareness, myofascial tenderness of the pelvic floor, pelvic floor dysfunction    Urine cytology today, if negative can monitor the bladder lesion seen    Discussed with patient that there is no indication for me to do any pelvic surgery at this time and " recommend that she consider a pessary to help with the urine leakage.  She is agreeable to this and will see me at the [facility] for a pessary fitting sometime next month.    They will think about whether or not they want to proceed with a bladder biopsy now or later.  She will contact the  for surgery scheduling if they decide to proceed    RTC in September    Pricila Liang MD MPH   of Urology    CC  Patient Care Team:  Stephany Hannon, VARSHA CNP as PCP - General (Family Practice)  STEPHANY HANNON

## 2018-07-20 LAB — COPATH REPORT: NORMAL

## 2018-07-23 ENCOUNTER — TELEPHONE (OUTPATIENT)
Dept: UROLOGY | Facility: CLINIC | Age: 68
End: 2018-07-23

## 2018-07-23 NOTE — TELEPHONE ENCOUNTER
Patient returned call to schedule surgery with Dr Liang on 8/6/18 @ [facility] OR.  Surgery packet sent via fed ex.

## 2018-07-23 NOTE — TELEPHONE ENCOUNTER
Called to schedule surgery for patient with Dr Liang.  Left voice mail with direct phone number [phone] for patient to call back to schedule surgery.

## 2018-07-30 DIAGNOSIS — N39.0 URINARY TRACT INFECTION: Primary | ICD-10-CM

## 2018-07-30 NOTE — PROGRESS NOTES
Discussed surgery details with the patient. She will have her pre-op and urine culture done 8/2/18 at [facility]. I will mail her soap.    Maria Elena Guzman RN   Care Coordinator Urology

## 2018-08-02 ENCOUNTER — OFFICE VISIT (OUTPATIENT)
Dept: PEDIATRICS | Facility: CLINIC | Age: 68
End: 2018-08-02
Payer: COMMERCIAL

## 2018-08-02 VITALS
OXYGEN SATURATION: 98 % | DIASTOLIC BLOOD PRESSURE: 70 MMHG | HEART RATE: 73 BPM | BODY MASS INDEX: 24.11 KG/M2 | HEIGHT: 63 IN | TEMPERATURE: 97.3 F | SYSTOLIC BLOOD PRESSURE: 100 MMHG | WEIGHT: 136.1 LBS

## 2018-08-02 DIAGNOSIS — R32 URINARY INCONTINENCE, UNSPECIFIED TYPE: ICD-10-CM

## 2018-08-02 DIAGNOSIS — M67.449 DIGITAL MUCINOUS CYST OF FINGER: ICD-10-CM

## 2018-08-02 DIAGNOSIS — Z01.818 PREOP GENERAL PHYSICAL EXAM: Primary | ICD-10-CM

## 2018-08-02 LAB
ANION GAP SERPL CALCULATED.3IONS-SCNC: 9 MMOL/L (ref 3–14)
BUN SERPL-MCNC: 16 MG/DL (ref 7–30)
CALCIUM SERPL-MCNC: 8.8 MG/DL (ref 8.5–10.1)
CHLORIDE SERPL-SCNC: 103 MMOL/L (ref 94–109)
CO2 SERPL-SCNC: 27 MMOL/L (ref 20–32)
CREAT SERPL-MCNC: 0.84 MG/DL (ref 0.52–1.04)
ERYTHROCYTE [DISTWIDTH] IN BLOOD BY AUTOMATED COUNT: 12.5 % (ref 10–15)
GFR SERPL CREATININE-BSD FRML MDRD: 67 ML/MIN/1.7M2
GLUCOSE SERPL-MCNC: 93 MG/DL (ref 70–99)
HCT VFR BLD AUTO: 42.3 % (ref 35–47)
HGB BLD-MCNC: 14 G/DL (ref 11.7–15.7)
MCH RBC QN AUTO: 32.8 PG (ref 26.5–33)
MCHC RBC AUTO-ENTMCNC: 33.1 G/DL (ref 31.5–36.5)
MCV RBC AUTO: 99 FL (ref 78–100)
PLATELET # BLD AUTO: 228 10E9/L (ref 150–450)
POTASSIUM SERPL-SCNC: 4 MMOL/L (ref 3.4–5.3)
RBC # BLD AUTO: 4.27 10E12/L (ref 3.8–5.2)
SODIUM SERPL-SCNC: 139 MMOL/L (ref 133–144)
WBC # BLD AUTO: 5.8 10E9/L (ref 4–11)

## 2018-08-02 PROCEDURE — 85027 COMPLETE CBC AUTOMATED: CPT | Performed by: NURSE PRACTITIONER

## 2018-08-02 PROCEDURE — 36415 COLL VENOUS BLD VENIPUNCTURE: CPT | Performed by: NURSE PRACTITIONER

## 2018-08-02 PROCEDURE — 93000 ELECTROCARDIOGRAM COMPLETE: CPT | Performed by: NURSE PRACTITIONER

## 2018-08-02 PROCEDURE — 80048 BASIC METABOLIC PNL TOTAL CA: CPT | Performed by: NURSE PRACTITIONER

## 2018-08-02 PROCEDURE — 99214 OFFICE O/P EST MOD 30 MIN: CPT | Performed by: NURSE PRACTITIONER

## 2018-08-02 NOTE — MR AVS SNAPSHOT
After Visit Summary   8/2/2018    Aleksandra Michel    MRN: [id number]           Patient Information     Date Of Birth          1950        Visit Information        Provider Department      8/2/2018 1:50 PM Stephany Hannon APRN CNP [facility]        Today's Diagnoses     Preop general physical exam    -  1    Digital mucinous cyst of finger left hand        Urinary incontinence, unspecified type          Care Instructions    PLAN:   1.   Orders Placed This Encounter   Procedures     Basic metabolic panel     CBC with platelets     ORTHOPEDICS ADULT REFERRAL     EKG 12-lead complete w/read - Clinics       2. Patient needs to follow up in if no improvement,or sooner if worsening of symptoms or other symptoms develop.  Will follow up and/or notify patient of  results via My Chart to determine further need for followup      Before Your Surgery      Call your surgeon if there is any change in your health. This includes signs of a cold or flu (such as a sore throat, runny nose, cough, rash or fever).    Do not smoke, drink alcohol or take over the counter medicine (unless your surgeon or primary care doctor tells you to) for the 24 hours before and after surgery.    If you take prescribed drugs: Follow your doctor s orders about which medicines to take and which to stop until after surgery.    Eating and drinking prior to surgery: follow the instructions from your surgeon    Take a shower or bath the night before surgery. Use the soap your surgeon gave you to gently clean your skin. If you do not have soap from your surgeon, use your regular soap. Do not shave or scrub the surgery site.  Wear clean pajamas and have clean sheets on your bed.   It was a pleasure seeing you today at the [facility]. Thank you for allowing us to care for you today. We truly hope we provided you with the excellent service you deserve. Please let us know if there is  anything else we can do for you so we can be sure you are leaving completley satisfied with your care experience.       General information about your clinic   Clinic Hours Lab Hours (Appointments are required)   Mon-Thurs: 7:30 AM - 7 PM Mon-Thurs: 7:30 AM - 7 PM   Fri: 7:30 AM - 5 PM Fri: 7:30 AM - 5 PM        After Hours Nurse Advise & Appts:  [facility]: [phone]  [facility] On Call: to make appointments anytime: [phone] On Call Physician: call [phone] and answering service will page the on call physician.        For urgent appointments, please call [phone] and ask for the triage nurse or your care team clinic nurse.  How to contact my care team:  MyChart: [email or web address]   Phone: [phone]   Fax: [phone]       [facility]:   Phone: [phone]  Hours: 8:00 AM - 6:00 PM  Medication Refills:  Call your pharmacy and they will forward the refill to us. Please allow 3 business days for your refills to be completed.       Normal or non-critical lab and imaging results will be communicated to you by MyChart, letter or phone within 7 days.  If you do not hear from us within 10 days, please call the clinic. If you have a critical or abnormal lab result, we will notify you by phone as soon as possible.       We now have PWIC (Pediatric Walk in Care)  Monday-Friday from 7:30-4. Simply walk in and be seen for your urgent needs like cough, fever, rash, diarrhea or vomiting, pink eye, UTI. No appointments needed. Ask one of the team for more information      -Your Care Team:    Dr. Ron Butterfield - Internal Medicine/Pediatrics   Dr. Casimiro Flood - Family Medicine  Dr. Barbra Pennington - Pediatrics  Dr. Eva Thomas - Pediatrics  Stephany Hannon CNP - Family Practice Nurse Practitioner                         Follow-ups after your visit        Additional Services     ORTHOPEDICS ADULT REFERRAL       Your provider has referred you to: [facility]: [facility] -  Martina Wan [phone]   [email or web address]    Please be aware that coverage of these services is subject to the terms and limitations of your health insurance plan.  Call member services at your health plan with any benefit or coverage questions.      Please bring the following to your appointment:    >>   Any x-rays, CTs or MRIs which have been performed.  Contact the facility where they were done to arrange for  prior to your scheduled appointment.    >>   List of current medications   >>   This referral request   >>   Any documents/labs given to you for this referral                  Your next 10 appointments already scheduled     Aug 06, 2018   Procedure with Pricila Liang MD   [facility] ([facility])    [address] [place], [place] [id number] [phone]           Located in the Clinics and Surgery Center at [address] [place] [address].   parking is very convenient and highly recommended.  is a $6 flat rate fee.  Both  and self parkers should enter the main arrival plaza from [facility]; parking attendants will direct you based on your parking preference.            Aug 09, 2018  1:15 PM CDT   (Arrive by 1:00 PM)   Return Visit with Pricila Liang MD   [email or web address] Urologic Cancers ([facility])    [address] [place] [address]  4th [facility] [address]   [phone]            Aug 23, 2018  3:00 PM CDT   (Arrive by 2:45 PM)   Post-Op with Pricila Liang MD   [email or web address] for Prostate and Urologic Cancers ([facility])    [address] [place] [address]   [phone]            Aug 27, 2018  9:30 AM CDT   Return Visit with Trevor Mena MD, [facility]  "NURSE ONLY   [facility]    [address] [place] [address] [phone]              Who to contact     If you have questions or need follow up information about today's clinic visit or your schedule please contact [facility] directly at [phone].  Normal or non-critical lab and imaging results will be communicated to you by [facility], letter or phone within 4 business days after the clinic has received the results. If you do not hear from us within 7 days, please contact the clinic through [facility] or phone. If you have a critical or abnormal lab result, we will notify you by phone as soon as possible.  Submit refill requests through i[employer] or call your pharmacy and they will forward the refill request to us. Please allow 3 business days for your refill to be completed.          Additional Information About Your Visit        iCAD Information     iCAD gives you secure access to your electronic health record. If you see a primary care provider, you can also send messages to your care team and make appointments. If you have questions, please call your primary care clinic.  If you do not have a primary care provider, please call [phone] and they will assist you.      iCAD is an electronic gateway that provides easy, online access to your medical records. With iCAD, you can request a clinic appointment, read your test results, renew a prescription or communicate with your care team.     To access your existing account, please contact your [facility] or call [phone] for assistance.        Care EveryWhere ID     This is your Care EveryWhere ID. This could be used by other organizations to access your [facility] medical records  [id number]        Your Vitals Were     Pulse Temperature Height Pulse Oximetry Breastfeeding? BMI (Body Mass Index)    73 97.3  F (36.3  C) (Temporal) 5' 2.75\" " (1.594 m) 98% No 24.3 kg/m2       Blood Pressure from Last 3 Encounters:   08/02/18 100/70   07/18/18 104/68   05/22/18 110/74    Weight from Last 3 Encounters:   08/02/18 136 lb 1.6 oz (61.7 kg)   07/18/18 130 lb (59 kg)   03/01/18 130 lb 3.2 oz (59.1 kg)              We Performed the Following     Basic metabolic panel     CBC with platelets     EKG 12-lead complete w/read - Clinics     ORTHOPEDICS ADULT REFERRAL        Primary Care Provider Office Phone # Fax #    Stephany Hannon, APRN [phone]       [address]  [place] [address]        Equal Access to Services     MIRELLA DOMINGUEZ : Hadii aad ku hadasho Soomaali, waaxda luqadaha, qaybta kaalmada adeegyada, lisa velez . So [facility] [phone].    ATENCIÓN: Si habla español, tiene a awad disposición servicios gratuitos de asistencia lingüística. Llame al [phone].    We comply with applicable federal civil rights laws and [place] laws. We do not discriminate on the basis of race, color, national origin, age, disability, sex, sexual orientation, or gender identity.            Thank you!     Thank you for choosing [facility]  for your care. Our goal is always to provide you with excellent care. Hearing back from our patients is one way we can continue to improve our services. Please take a few minutes to complete the written survey that you may receive in the mail after your visit with us. Thank you!             Your Updated Medication List - Protect others around you: Learn how to safely use, store and throw away your medicines at www.disposemymeds.org.          This list is accurate as of 8/2/18  2:36 PM.  Always use your most recent med list.                   Brand Name Dispense Instructions for use Diagnosis    citalopram 10 MG tablet    celeXA    90 tablet    TAKE 1 TABLET (10 MG) BY MOUTH DAILY    Situational depression       COMPOUNDED NON-CONTROLLED SUBSTANCE - PHARMACY  TO MIX COMPOUNDED MEDICATION    CMPD RX    50 g    estrodiol 1mg /gram-use dime size amount to the vagina 2-3 x a week at night    Atrophic vaginitis       DAILY MULTIVITAMIN PO      Take 1 tablet by mouth daily.        PRESERVISION/LUTEIN PO      Take 1 tablet by mouth daily.        UNABLE TO FIND      MEDICATION NAME: Beyond Osteo Young Gevity  Liquid calcium        UNKNOWN TO PATIENT      Hormone cream from Gyno-uses twice per week        vitamin B complex with vitamin C Tabs tablet      Take 1 tablet by mouth daily

## 2018-08-02 NOTE — PROGRESS NOTES
[address] [address]  [phone]  Dept: [phone]    PRE-OP EVALUATION:  Today's date: 2018    Aleksandra Michel (: 1950) presents for pre-operative evaluation assessment as requested by Dr. Liang.  She requires evaluation and anesthesia risk assessment prior to undergoing surgery/procedure for treatment of urinary incontinence .    Proposed Surgery/ Procedure: COMBINED CYSTOSCOPY, BIOPSY BLADDER  Date of Surgery/ Procedure: 18  Time of Surgery/ Procedure: 10:55  Hospital/Surgical Facility: [facility]  Fax number for surgical facility:   Primary Physician: Stephany Hannon  Type of Anesthesia Anticipated: to be determined    Patient has a Health Care Directive or Living Will:  YES     1. NO - Do you have a history of heart attack, stroke, stent, bypass or surgery on an artery in the head, neck, heart or legs?  2. NO - Do you ever have any pain or discomfort in your chest?  3. NO - Do you have a history of  Heart Failure?  4. NO - Are you troubled by shortness of breath when: walking on the level, up a slight hill or at night?  5. NO - Do you currently have a cold, bronchitis or other respiratory infection?  6. NO - Do you have a cough, shortness of breath or wheezing?  7. NO - Do you sometimes get pains in the calves of your legs when you walk?  8. NO - Do you or anyone in your family have previous history of blood clots?  9. NO - Do you or does anyone in your family have a serious bleeding problem such as prolonged bleeding following surgeries or cuts?  10. NO - Have you ever had problems with anemia or been told to take iron pills?  11. NO - Have you had any abnormal blood loss such as black, tarry or bloody stools, or abnormal vaginal bleeding?  12. NO - Have you ever had a blood transfusion?  13. YES - Have you or any of your relatives ever had problems with anesthesia? Trouble walking up from anesthesia after  hysterectomy 43 years ago  14. NO - Do you have sleep apnea, excessive snoring or daytime drowsiness?  15. NO - Do you have any prosthetic heart valves?  16. NO - Do you have prosthetic joints?  17. NO - Is there any chance that you may be pregnant?      HPI:     HPI related to upcoming procedure:  treatment of urinary incontinence .    Proposed Surgery/ Procedure: COMBINED CYSTOSCOPY, BIOPSY BLADDER      See problem list for active medical problems.  Problems all longstanding and stable, except as noted/documented.  See ROS for pertinent symptoms related to these conditions.                                                                                                                                                          .    MEDICAL HISTORY:     Patient Active Problem List    Diagnosis Date Noted     Varicose veins of both lower extremities 01/17/2017     Priority: Medium     Advanced directives, counseling/discussion 03/27/2013     Priority: Medium     Advance Care Planning:   ACP Review and Resources Provided:  Reviewed chart for advance care plan.  Aleksandra KARLA Michel has no plan or code status on file. Discussed available resources and provided with information. Added by Ronald Irwin on 3/27/2013             CARDIOVASCULAR SCREENING; LDL GOAL LESS THAN 160 06/22/2012     Priority: Medium     Colon polyp 06/21/2012     Priority: Medium     2010??, due Q 5 years.        Seasonal allergic rhinitis 06/21/2012     Priority: Medium     Osteoporosis without current pathological fracture 06/21/2012     Priority: Medium     On Fosomax   Problem list name updated by automated process. Provider to review        Past Medical History:   Diagnosis Date     Allergic rhinitis, cause unspecified     allergy shots in the past.      Major depressive disorder, single episode     noted per past records with [facility] physicians     Nosebleed     er visit for tamponade while on nasonex.      Past Surgical History:   Procedure  Laterality Date     ENHANCE LASER REFRACTIVE BILATERAL EXISTING PT IN PARAMETERS Right ~2008    Distance eye     HYSTERECTOMY, VAGINAL  1985    age 36, prolonged menses. ovaries remain     Current Outpatient Prescriptions   Medication Sig Dispense Refill     citalopram (CELEXA) 10 MG tablet TAKE 1 TABLET (10 MG) BY MOUTH DAILY (Patient not taking: Reported on 7/18/2018) 90 tablet 3     COMPOUNDED NON-CONTROLLED SUBSTANCE (CMPD RX) - PHARMACY TO MIX COMPOUNDED MEDICATION estrodiol 1mg /gram-use dime size amount to the vagina 2-3 x a week at night (Patient not taking: Reported on 7/18/2018) 50 g 3     Multiple Vitamin (DAILY MULTIVITAMIN PO) Take 1 tablet by mouth daily.       Multiple Vitamins-Minerals (PRESERVISION/LUTEIN PO) Take 1 tablet by mouth daily.       UNABLE TO FIND MEDICATION NAME: Beyond Osteo Young Gevity   Liquid calcium        UNKNOWN TO PATIENT Hormone cream from Gyno-uses twice per week        vitamin B complex with vitamin C (VITAMIN  B COMPLEX) TABS tablet Take 1 tablet by mouth daily       OTC products: None, except as noted above    Allergies   Allergen Reactions     Dust Mites      Mold      Seasonal Allergies       Latex Allergy: NO    Social History   Substance Use Topics     Smoking status: Former Smoker     Years: 4.00     Types: Cigarettes     Smokeless tobacco: Never Used     Alcohol use Yes      Comment: occasional glass of wine     History   Drug Use No       REVIEW OF SYSTEMS:   CONSTITUTIONAL: NEGATIVE for fever, chills, change in weight  INTEGUMENTARY/SKIN: NEGATIVE for rash   EYES: NEGATIVE for vision changes or irritation  ENT/MOUTH: NEGATIVE for ear, mouth and throat problems  RESP: NEGATIVE for significant cough or SOB  CV: NEGATIVE for chest pain, palpitations or peripheral edema  GI: NEGATIVE for nausea, abdominal pain, heartburn, or change in bowel habits   female: incontinence-urge and incontinence-stress  MUSCULOSKELETAL: NEGATIVE for significant arthralgias or  "myalgia  NEURO: NEGATIVE for weakness, dizziness or paresthesias  ENDOCRINE: NEGATIVE for temperature intolerance, skin/hair changes  HEME: NEGATIVE for bleeding problems  PSYCHIATRIC: NEGATIVE for changes in mood or affect    EXAM:   /70 (BP Location: Right arm, Patient Position: Sitting, Cuff Size: Adult Regular)  Pulse 73  Temp 97.3  F (36.3  C) (Temporal)  Ht 5' 2.75\" (1.594 m)  Wt 136 lb 1.6 oz (61.7 kg)  SpO2 98%  Breastfeeding? No  BMI 24.3 kg/m2    GENERAL APPEARANCE: healthy, alert and no distress     EYES: Eyes grossly normal to inspection and conjunctivae and sclerae normal     HENT: ear canals and TM's normal and nose and mouth without ulcers or lesions     NECK: no adenopathy     RESP: lungs clear to auscultation - no rales, rhonchi or wheezes     CV: regular rates and rhythm     ABDOMEN: soft, nontender     MS: extremities normal- no gross deformities noted, no evidence of inflammation in joints, FROM in all extremities.  Looks like a mucinous cyst at end of 5th finger       SKIN: no suspicious lesions or rashes     NEURO: mentation intact and speech normal     PSYCH: mentation appears normal. and affect normal/bright     LYMPHATICS: No cervical adenopathy    DIAGNOSTICS:   EKG: appears normal, NSR, normal axis, normal intervals, no acute ST/T changes c/w ischemia, no LVH by voltage criteria, unchanged from previous tracings  Results for orders placed or performed in visit on 08/02/18   Basic metabolic panel   Result Value Ref Range    Sodium 139 133 - 144 mmol/L    Potassium 4.0 3.4 - 5.3 mmol/L    Chloride 103 94 - 109 mmol/L    Carbon Dioxide 27 20 - 32 mmol/L    Anion Gap 9 3 - 14 mmol/L    Glucose 93 70 - 99 mg/dL    Urea Nitrogen 16 7 - 30 mg/dL    Creatinine 0.84 0.52 - 1.04 mg/dL    GFR Estimate 67 >60 mL/min/1.7m2    GFR Estimate If Black 81 >60 mL/min/1.7m2    Calcium 8.8 8.5 - 10.1 mg/dL   CBC with platelets   Result Value Ref Range    WBC 5.8 4.0 - 11.0 10e9/L    RBC Count 4.27 " 3.8 - 5.2 10e12/L    Hemoglobin 14.0 11.7 - 15.7 g/dL    Hematocrit 42.3 35.0 - 47.0 %    MCV 99 78 - 100 fl    MCH 32.8 26.5 - 33.0 pg    MCHC 33.1 31.5 - 36.5 g/dL    RDW 12.5 10.0 - 15.0 %    Platelet Count 228 150 - 450 10e9/L       IMPRESSION:   Reason for surgery/procedure:  treatment of urinary incontinence .    Proposed Surgery/ Procedure: COMBINED CYSTOSCOPY, BIOPSY BLADDER  Diagnosis/reason for consult: preop evaluation     The proposed surgical procedure is considered INTERMEDIATE risk.    REVISED CARDIAC RISK INDEX  The patient has the following serious cardiovascular risks for perioperative complications such as (MI, PE, VFib and 3  AV Block):  No serious cardiac risks  INTERPRETATION: 1 risks: Class II (low risk - 0.9% complication rate)    The patient has the following additional risks for perioperative complications:  No identified additional risks      ICD-10-CM    1. Preop general physical exam Z01.818 EKG 12-lead complete w/read - Clinics     Basic metabolic panel     CBC with platelets   2. Digital mucinous cyst of finger left hand M67.449 ORTHOPEDICS ADULT REFERRAL   3. Urinary incontinence, unspecified type R32 Basic metabolic panel     CBC with platelets       RECOMMENDATIONS:     --Patient is to take all scheduled medications on the day of surgery EXCEPT for modifications listed below.    APPROVAL GIVEN to proceed with proposed procedure, without further diagnostic evaluation       Signed Electronically by: VARSHA Perkins CNP    Copy of this evaluation report is provided to requesting physician.    [facility] Preop Guidelines    Revised Cardiac Risk Index

## 2018-08-02 NOTE — PROGRESS NOTES
Candice Michel,    Attached are your test results.  -Kidney function is normal (Cr, GFR), Sodium is normal, Potassium is normal, Calcium is normal, Glucose is normal (diabetes screening test).   -Normal red blood cell (hgb) levels, normal white blood cell count and normal platelet levels.   Please contact us if you have any questions.    Stephany Hannon, CNP

## 2018-08-02 NOTE — NURSING NOTE
"Chief Complaint   Patient presents with     Pre-Op Exam     DOS:8/6/18       Initial /70 (BP Location: Right arm, Patient Position: Sitting, Cuff Size: Adult Regular)  Pulse 73  Temp 97.3  F (36.3  C) (Temporal)  Ht 5' 2.75\" (1.594 m)  Wt 136 lb 1.6 oz (61.7 kg)  SpO2 98%  Breastfeeding? No  BMI 24.3 kg/m2 Estimated body mass index is 24.3 kg/(m^2) as calculated from the following:    Height as of this encounter: 5' 2.75\" (1.594 m).    Weight as of this encounter: 136 lb 1.6 oz (61.7 kg).  Medication Reconciliation: complete      MEENU Jordan      "

## 2018-08-02 NOTE — PATIENT INSTRUCTIONS
PLAN:   1.   Orders Placed This Encounter   Procedures     Basic metabolic panel     CBC with platelets     ORTHOPEDICS ADULT REFERRAL     EKG 12-lead complete w/read - Clinics       2. Patient needs to follow up in if no improvement,or sooner if worsening of symptoms or other symptoms develop.  Will follow up and/or notify patient of  results via My Chart to determine further need for followup      Before Your Surgery      Call your surgeon if there is any change in your health. This includes signs of a cold or flu (such as a sore throat, runny nose, cough, rash or fever).    Do not smoke, drink alcohol or take over the counter medicine (unless your surgeon or primary care doctor tells you to) for the 24 hours before and after surgery.    If you take prescribed drugs: Follow your doctor s orders about which medicines to take and which to stop until after surgery.    Eating and drinking prior to surgery: follow the instructions from your surgeon    Take a shower or bath the night before surgery. Use the soap your surgeon gave you to gently clean your skin. If you do not have soap from your surgeon, use your regular soap. Do not shave or scrub the surgery site.  Wear clean pajamas and have clean sheets on your bed.   It was a pleasure seeing you today at the [facility]. Thank you for allowing us to care for you today. We truly hope we provided you with the excellent service you deserve. Please let us know if there is anything else we can do for you so we can be sure you are leaving completley satisfied with your care experience.       General information about your clinic   Clinic Hours Lab Hours (Appointments are required)   Mon-Thurs: 7:30 AM - 7 PM Mon-Thurs: 7:30 AM - 7 PM   Fri: 7:30 AM - 5 PM Fri: 7:30 AM - 5 PM        After Hours Nurse Advise & Appts:  [facility]: [phone]  [facility] On Call: to make appointments anytime: [phone] On Call Physician: call  [phone] and answering service will page the on call physician.        For urgent appointments, please call [phone] and ask for the triage nurse or your care team clinic nurse.  How to contact my care team:  [facility]: [email or web address]   Phone: [phone]   Fax: [phone]       [facility]:   Phone: [phone]  Hours: 8:00 AM - 6:00 PM  Medication Refills:  Call your pharmacy and they will forward the refill to us. Please allow 3 business days for your refills to be completed.       Normal or non-critical lab and imaging results will be communicated to you by MyChart, letter or phone within 7 days.  If you do not hear from us within 10 days, please call the clinic. If you have a critical or abnormal lab result, we will notify you by phone as soon as possible.       We now have PWIC (Pediatric Walk in Care)  Monday-Friday from 7:30-4. Simply walk in and be seen for your urgent needs like cough, fever, rash, diarrhea or vomiting, pink eye, UTI. No appointments needed. Ask one of the team for more information      -Your Care Team:    Dr. Ron Butterfield - Internal Medicine/Pediatrics   Dr. Casimiro Flood - Family Medicine  Dr. Barbra Pennington - Pediatrics  Dr. Eva Thomas - Pediatrics  Stephany Hannon CNP - Family Practice Nurse Practitioner

## 2018-08-03 ENCOUNTER — ANESTHESIA EVENT (OUTPATIENT)
Dept: SURGERY | Facility: AMBULATORY SURGERY CENTER | Age: 68
End: 2018-08-03

## 2018-08-06 ENCOUNTER — ANESTHESIA (OUTPATIENT)
Dept: SURGERY | Facility: AMBULATORY SURGERY CENTER | Age: 68
End: 2018-08-06

## 2018-08-06 ENCOUNTER — SURGERY (OUTPATIENT)
Age: 68
End: 2018-08-06

## 2018-08-06 ENCOUNTER — HOSPITAL ENCOUNTER (OUTPATIENT)
Facility: AMBULATORY SURGERY CENTER | Age: 68
End: 2018-08-06
Attending: UROLOGY
Payer: COMMERCIAL

## 2018-08-06 VITALS
OXYGEN SATURATION: 100 % | DIASTOLIC BLOOD PRESSURE: 69 MMHG | WEIGHT: 130 LBS | RESPIRATION RATE: 16 BRPM | TEMPERATURE: 97.6 F | HEART RATE: 80 BPM | SYSTOLIC BLOOD PRESSURE: 111 MMHG | HEIGHT: 63 IN | BODY MASS INDEX: 23.04 KG/M2

## 2018-08-06 DIAGNOSIS — G89.18 POSTOPERATIVE PAIN: Primary | ICD-10-CM

## 2018-08-06 RX ORDER — LIDOCAINE 40 MG/G
CREAM TOPICAL
Status: DISCONTINUED | OUTPATIENT
Start: 2018-08-06 | End: 2018-08-06 | Stop reason: HOSPADM

## 2018-08-06 RX ORDER — LIDOCAINE HYDROCHLORIDE 20 MG/ML
INJECTION, SOLUTION INFILTRATION; PERINEURAL PRN
Status: DISCONTINUED | OUTPATIENT
Start: 2018-08-06 | End: 2018-08-06

## 2018-08-06 RX ORDER — PROPOFOL 10 MG/ML
INJECTION, EMULSION INTRAVENOUS PRN
Status: DISCONTINUED | OUTPATIENT
Start: 2018-08-06 | End: 2018-08-06

## 2018-08-06 RX ORDER — NALOXONE HYDROCHLORIDE 0.4 MG/ML
.1-.4 INJECTION, SOLUTION INTRAMUSCULAR; INTRAVENOUS; SUBCUTANEOUS
Status: DISCONTINUED | OUTPATIENT
Start: 2018-08-06 | End: 2018-08-07 | Stop reason: HOSPADM

## 2018-08-06 RX ORDER — PROPOFOL 10 MG/ML
INJECTION, EMULSION INTRAVENOUS CONTINUOUS PRN
Status: DISCONTINUED | OUTPATIENT
Start: 2018-08-06 | End: 2018-08-06

## 2018-08-06 RX ORDER — SODIUM CHLORIDE, SODIUM LACTATE, POTASSIUM CHLORIDE, CALCIUM CHLORIDE 600; 310; 30; 20 MG/100ML; MG/100ML; MG/100ML; MG/100ML
INJECTION, SOLUTION INTRAVENOUS CONTINUOUS
Status: DISCONTINUED | OUTPATIENT
Start: 2018-08-06 | End: 2018-08-07 | Stop reason: HOSPADM

## 2018-08-06 RX ORDER — ONDANSETRON 2 MG/ML
4 INJECTION INTRAMUSCULAR; INTRAVENOUS EVERY 30 MIN PRN
Status: DISCONTINUED | OUTPATIENT
Start: 2018-08-06 | End: 2018-08-07 | Stop reason: HOSPADM

## 2018-08-06 RX ORDER — ONDANSETRON 4 MG/1
4 TABLET, ORALLY DISINTEGRATING ORAL EVERY 30 MIN PRN
Status: DISCONTINUED | OUTPATIENT
Start: 2018-08-06 | End: 2018-08-07 | Stop reason: HOSPADM

## 2018-08-06 RX ORDER — HYDROCODONE BITARTRATE AND ACETAMINOPHEN 5; 325 MG/1; MG/1
1 TABLET ORAL EVERY 4 HOURS PRN
Qty: 6 TABLET | Refills: 0 | Status: SHIPPED | OUTPATIENT
Start: 2018-08-06 | End: 2019-01-22

## 2018-08-06 RX ORDER — SODIUM CHLORIDE, SODIUM LACTATE, POTASSIUM CHLORIDE, CALCIUM CHLORIDE 600; 310; 30; 20 MG/100ML; MG/100ML; MG/100ML; MG/100ML
INJECTION, SOLUTION INTRAVENOUS CONTINUOUS
Status: DISCONTINUED | OUTPATIENT
Start: 2018-08-06 | End: 2018-08-06 | Stop reason: HOSPADM

## 2018-08-06 RX ORDER — ACETAMINOPHEN 325 MG/1
975 TABLET ORAL ONCE
Status: COMPLETED | OUTPATIENT
Start: 2018-08-06 | End: 2018-08-06

## 2018-08-06 RX ORDER — MEPERIDINE HYDROCHLORIDE 25 MG/ML
12.5 INJECTION INTRAMUSCULAR; INTRAVENOUS; SUBCUTANEOUS
Status: DISCONTINUED | OUTPATIENT
Start: 2018-08-06 | End: 2018-08-07 | Stop reason: HOSPADM

## 2018-08-06 RX ADMIN — PROPOFOL 30 MG: 10 INJECTION, EMULSION INTRAVENOUS at 12:36

## 2018-08-06 RX ADMIN — PROPOFOL 30 MG: 10 INJECTION, EMULSION INTRAVENOUS at 12:29

## 2018-08-06 RX ADMIN — PROPOFOL 30 MG: 10 INJECTION, EMULSION INTRAVENOUS at 12:26

## 2018-08-06 RX ADMIN — Medication 1000 ML: at 13:07

## 2018-08-06 RX ADMIN — SODIUM CHLORIDE, SODIUM LACTATE, POTASSIUM CHLORIDE, CALCIUM CHLORIDE: 600; 310; 30; 20 INJECTION, SOLUTION INTRAVENOUS at 09:33

## 2018-08-06 RX ADMIN — ACETAMINOPHEN 975 MG: 325 TABLET ORAL at 09:33

## 2018-08-06 RX ADMIN — PROPOFOL 50 MG: 10 INJECTION, EMULSION INTRAVENOUS at 12:49

## 2018-08-06 RX ADMIN — LIDOCAINE HYDROCHLORIDE 60 MG: 20 INJECTION, SOLUTION INFILTRATION; PERINEURAL at 12:25

## 2018-08-06 RX ADMIN — PROPOFOL 125 MCG/KG/MIN: 10 INJECTION, EMULSION INTRAVENOUS at 12:26

## 2018-08-06 NOTE — OP NOTE
OPERATIVE REPORT    PREOPERATIVE DIAGNOSIS:  Irregularity of left lateral wall seen on office cysto    POSTOPERATIVE DIAGNOSIS: Same    PROCEDURES PERFORMED:   1. Cystourethroscopy  2. Targeted bladder biopsies  3. Fulguration of biopsied areas    STAFF SURGEON: Pricila Liang, available for entire case.     RESIDENT(S):  Soco Hart MD    ANESTHESIA: Other    ESTIMATED BLOOD LOSS: < 10 mL.     DRAINS: None    OPERATIVE INDICATIONS:   Aleksandra Michel is a(n) 68 year old female with a history of vaginal vault prolapse, urinary incontience, and pelvic floor dsyfunction. In-office cystoscopy revealed a fine irregularity on the left lateral wall. The patient elected for biopsy and maximal fulguration, after a discussion of all risks, benefits, and alternatives.    DESCRIPTION OF PROCEDURE:   After verification of informed consent was obtained, the patient was brought to the operating room, and moved to the operating table. After adequate anesthesia was induced, the patient was repositioned in dorsal lithotomy position in supportive yellow fin stirrups with care in neural safety in positioning of all four extremities.  She was then prepped and draped in the usual sterile fashion. A formal timeout was performed to confirm the correct patient, procedure and operative site.     A 22-Setswana rigid cystoscope was inserted into a well lubricated urethra. We began by performing a white light cystourethroscopy. The urethra was unremarkable. The ureteral orifices were in their orthotopic positions bilaterally. There were no intravesical stones or diverticuli and the bladder was not trabeculated. A tiny 2mm hypervascular area was identified on the right posterior wall and a small  4mm flat irregularity with a granular appearance was identified on the right lateral wall.     We used a cold-cup flexible biopsy forceps to biopsy the left lateral wall and right posterior wall and passed these off for pathology. A Bugbee was used to  fulgurate the biopsied sites and any bleeding areas. The bladder was re-examined under low pressure and hemostasis was confirmed. At this point, the bladder was drained and the cystoscope withdrawn.    The procedure was then concluded. The patient was transferred to the postanesthesia care unit in stable condition and tolerated the procedure well.    POSTOP PLAN:  1. Discharge home  2. F/u appt 8/9/18 with Dr Liang    Addendum:    I, Pricila Liang, was present for the entire case.  I agree with the note as above with changes made as needed.  I spoke to the patient's  and daughter in the waiting room at the end of the case.  Plan to follow up on Thursday for a pessary fitting per her request    Pricila Liang MD MPH   of Urology

## 2018-08-06 NOTE — ANESTHESIA PREPROCEDURE EVALUATION
Anesthesia Evaluation     . Pt has had prior anesthetic.     No history of anesthetic complications          ROS/MED HX    ENT/Pulmonary:  - neg pulmonary ROS     Neurologic:  - neg neurologic ROS     Cardiovascular:  - neg cardiovascular ROS       METS/Exercise Tolerance:  >4 METS   Hematologic:         Musculoskeletal:         GI/Hepatic:  - neg GI/hepatic ROS      (-) GERD   Renal/Genitourinary:         Endo:  - neg endo ROS       Psychiatric:  - neg psychiatric ROS       Infectious Disease:  - neg infectious disease ROS       Malignancy:         Other:                     Physical Exam  Normal systems: dental    Airway   Mallampati: III  TM distance: >3 FB  Neck ROM: full    Dental     Cardiovascular   Rhythm and rate: regular      Pulmonary    breath sounds clear to auscultation                    Anesthesia Plan      History & Physical Review  History and physical reviewed and following examination; no interval change.    ASA Status:  1 .    NPO Status:  > 8 hours    Plan for MAC Maintenance will be TIVA.  Reason for MAC:  Deep or markedly invasive procedure (G8)  PONV prophylaxis:  Ondansetron (or other 5HT-3)       Postoperative Care  Postoperative pain management:  Multi-modal analgesia.      Consents  Anesthetic plan, risks, benefits and alternatives discussed with:  Patient..                          .

## 2018-08-06 NOTE — DISCHARGE INSTRUCTIONS
[facility]  Home Care Following Anesthesia  For 24 hours after surgery:  1. Get plenty of rest.  A responsible adult must stay with you for at least 24 hours after you leave the surgery center.  2. Do not drive or use heavy equipment.  If you have weakness or tingling, don't drive or use heavy equipment until this feeling goes away.   3. Do not drink alcohol.   4. Avoid strenuous or risky activities.  Ask for help when climbing stairs.  5. You may feel lightheaded.  IF so, sit for a few minutes before standing.  Have someone help you get up.   6. If you have nausea (feel sick to your stomach): Drink only clear liquids such as apple juice, ginger ale, broth or 7-Up.  Rest may also help.  Be sure to drink enough fluids.  Move to a regular diet as you feel able.   7. You may have a slight fever.  Call the doctor if your fever is over 100 F (37.7 C) (taken under the tongue) or lasts longer than 24 hours.  8. You may have a dry mouth, a sore throat, muscle aches or trouble sleeping. These should go away after 24 hours.  9. Do not make important or legal decisions.               Tips for taking pain medications  To get the best pain relief possible, remember these points:    Take pain medications as directed, before pain becomes severe.    Pain medication can upset your stomach: taking it with food may help.    Constipation is a common side effect of pain medication. Drink plenty of  fluids.    Eat foods high in fiber. Take a stool softener if recommended by your doctor or pharmacist.    Do not drink alcohol, drive or operate machinery while taking pain medications.    Ask about other ways to control pain, such as with heat, ice or relaxation.    Tylenol/Acetaminophen Consumption  To help encourage the safe use of acetaminophen, the makers of TYLENOL  have lowered the maximum daily dose for single-ingredient Extra Strength TYLENOL  (acetaminophen) products sold in the [place] from 8  pills per day (4,000 mg) to 6 pills per day (3,000 mg). The dosing interval has also changed from 2 pills every 4-6 hours to 2 pills every 6 hours.    If you feel your pain relief is insufficient, you may take Tylenol/Acetaminophen in addition to your narcotic pain medication.     Be careful not to exceed 3,000 mg of Tylenol/Acetaminophen in a 24 hour period from all sources.    If you are taking extra strength Tylenol/acetaminophen (500 mg), the maximum dose is 6 tablets in 24 hours.    If you are taking regular strength acetaminophen (325 mg), the maximum dose is 9 tablets in 24 hours.    Call a doctor for any of the followin. Signs of infection (fever, growing tenderness at the surgery site, a large amount of drainage or bleeding, severe pain, foul-smelling drainage, redness, swelling).  2. It has been over 8 to 10 hours since surgery and you are still not able to urinate (pass water).  3. Headache for over 24 hours.  4. Numbness, tingling or weakness the day after surgery (if you had spinal anesthesia).  Your doctor is:  Dr. Pricila Liang, Prostate and Urology: [phone]                    Or dial [phone] and ask for the resident on call for:  Prostate Urology  For emergency care, call the:  [facility]:  [phone] (TTY for hearing impaired: [phone])

## 2018-08-06 NOTE — ANESTHESIA CARE TRANSFER NOTE
Patient: Aleksandra Michel    Procedure(s):  Cystoscopy with Bladder Biopsy  ([facility]) - Wound Class: II-Clean Contaminated    Diagnosis: Bladder Lesion  Diagnosis Additional Information: No value filed.    Anesthesia Type:   MAC     Note:  Airway :Room Air  Patient transferred to:Phase II  Comments: Report to Rn    99%, 67, 16, 95/61Handoff Report: Identifed the Patient, Identified the Reponsible Provider, Reviewed the pertinent medical history, Discussed the surgical course, Reviewed Intra-OP anesthesia mangement and issues during anesthesia, Set expectations for post-procedure period and Allowed opportunity for questions and acknowledgement of understanding      Vitals: (Last set prior to Anesthesia Care Transfer)    CRNA VITALS  8/6/2018 1225 - 8/6/2018 1300      8/6/2018             Pulse: 69    SpO2: 96 %    Resp Rate (set): 10                Electronically Signed By: VARSHA Sandra CRNA  August 6, 2018  1:00 PM

## 2018-08-06 NOTE — ANESTHESIA POSTPROCEDURE EVALUATION
Patient: Aleksandra Michel    Procedure(s):  Cystoscopy with Bladder Biopsy   - Wound Class: II-Clean Contaminated    Diagnosis:Bladder Lesion  Diagnosis Additional Information: No value filed.    Anesthesia Type:  MAC    Note:  Anesthesia Post Evaluation    Patient location during evaluation: Phase 2  Patient participation: Able to fully participate in evaluation  Level of consciousness: awake  Pain management: adequate  Airway patency: patent  Cardiovascular status: acceptable  Respiratory status: acceptable  Hydration status: acceptable  PONV: none     Anesthetic complications: None          Last vitals:  Vitals:    08/06/18 0909 08/06/18 1303 08/06/18 1318   BP: 119/80 95/61 111/69   Pulse: 72 72 80   Resp: 16 14 16   Temp: 36.4  C (97.6  F) 36.3  C (97.4  F)    SpO2: 100% 98% 100%         Electronically Signed By: Danilo Gurrola MD  August 6, 2018  1:19 PM

## 2018-08-06 NOTE — IP AVS SNAPSHOT
[facility]    [address] [place] [id number]    Phone:  [phone]    Fax:  [phone]                                       After Visit Summary   8/6/2018    Aleksandra Michel    MRN: [id number]           After Visit Summary Signature Page     I have received my discharge instructions, and my questions have been answered. I have discussed any challenges I see with this plan with the nurse or doctor.    ..........................................................................................................................................  Patient/Patient Representative Signature      ..........................................................................................................................................  Patient Representative Print Name and Relationship to Patient    ..................................................               ................................................  Date                                            Time    ..........................................................................................................................................  Reviewed by Signature/Title    ...................................................              ..............................................  Date                                                            Time

## 2018-08-06 NOTE — IP AVS SNAPSHOT
MRN:[id number]                      After Visit Summary   8/6/2018    Aleksandra Michel    MRN: [id number]           Thank you!     Thank you for choosing [facility] for your care. Our goal is always to provide you with excellent care. Hearing back from our patients is one way we can continue to improve our services. Please take a few minutes to complete the written survey that you may receive in the mail after you visit with us. Thank you!        Patient Information     Date Of Birth          1950        About your hospital stay     You were admitted on:  August 6, 2018 [facility]    You were discharged on:  August 6, 2018       Who to Call     For medical emergencies, please call 911.  For non-urgent questions about your medical care, please call your primary care provider or clinic, [phone]  For questions related to your surgery, please call your surgery clinic        Attending Provider     Provider Specialty    Pricila Liang MD Urology       Primary Care Provider Office Phone # Fax #    Stephany VARSHA [facility] [phone]      After Care Instructions     Discharge Instructions       Activity  - No strenuous exercise for 2 weeks.  - No lifting, pushing, pulling more than 10 pounds for 2 weeks.   - Do not strain with bowel movements.  - Do not drive until you can press the brake pedal quickly and fully without pain.   - Do not operate a motor vehicle while taking narcotic pain medications.     Medications  - Transition from narcotic pain medications to tylenol (acetaminophen) as you are able.  Wean yourself off all pain medications as you are able.  - Some pain medications contain both tylenol (acetaminophen) and a narcotic (Norco, vicodin, percocet), do not take more than 4,000mg of Tylenol (acetaminophen) from all sources in any 24 hour period.  - Narcotics can make you constipated.  Take over the  "counter fiber (metamucil or benefiber) and stool softeners (miralax, docusate or senna) while taking narcotic pain medications, but stop if you develop diarrhea.  - No driving or operating machinery while taking narcotic pain medications     Follow-Up:  - Call your primary care provider to touch base regarding your recent admission.    - Call or return sooner than your regularly scheduled visit if you develop any of the following: fever (greater than 101.5), uncontrolled pain, uncontrolled nausea or vomiting, as well as increased redness, swelling, or drainage from your wound.     Phone numbers:   - Monday through Friday 8am to 4:30pm: Call [phone] with questions or to schedule or confirm appointment.    - Nights or weekends: call the after hours emergency pager - [phone] and tell the  \"I would like to page the Urology Resident on call.\"  - For emergencies, call 911                  Your next 10 appointments already scheduled     Aug 09, 2018  1:15 PM CDT   (Arrive by 1:00 PM)   Return Visit with Pricila Liang MD   [facility] and [facility] for [facility]state [facility] Urologic Cancers ([email or web address] [place])    [address] [place] [id number] [phone]            Aug 23, 2018  3:00 PM CDT   (Arrive by 2:45 PM)   Post-Op with Pricila Liang MD   [facility] and [facility] for Prostate and Urologic Cancers ([facility])    [address] [place] [id number] [phone]            Aug 27, 2018  9:30 AM CDT   Return Visit with Trevor Mena MD, [facility] OPHTH NURSE ONLY   [facility] ([facility])    [id number] [place] [address] [phone]              Further instructions from your care team       [facility]  Home Care Following Anesthesia  For 24 hours after surgery:  1. Get plenty of rest.  A responsible " adult must stay with you for at least 24 hours after you leave the surgery center.  2. Do not drive or use heavy equipment.  If you have weakness or tingling, don't drive or use heavy equipment until this feeling goes away.   3. Do not drink alcohol.   4. Avoid strenuous or risky activities.  Ask for help when climbing stairs.  5. You may feel lightheaded.  IF so, sit for a few minutes before standing.  Have someone help you get up.   6. If you have nausea (feel sick to your stomach): Drink only clear liquids such as apple juice, ginger ale, broth or 7-Up.  Rest may also help.  Be sure to drink enough fluids.  Move to a regular diet as you feel able.   7. You may have a slight fever.  Call the doctor if your fever is over 100 F (37.7 C) (taken under the tongue) or lasts longer than 24 hours.  8. You may have a dry mouth, a sore throat, muscle aches or trouble sleeping. These should go away after 24 hours.  9. Do not make important or legal decisions.               Tips for taking pain medications  To get the best pain relief possible, remember these points:    Take pain medications as directed, before pain becomes severe.    Pain medication can upset your stomach: taking it with food may help.    Constipation is a common side effect of pain medication. Drink plenty of  fluids.    Eat foods high in fiber. Take a stool softener if recommended by your doctor or pharmacist.    Do not drink alcohol, drive or operate machinery while taking pain medications.    Ask about other ways to control pain, such as with heat, ice or relaxation.    Tylenol/Acetaminophen Consumption  To help encourage the safe use of acetaminophen, the makers of TYLENOL  have lowered the maximum daily dose for single-ingredient Extra Strength TYLENOL  (acetaminophen) products sold in the [place] from 8 pills per day (4,000 mg) to 6 pills per day (3,000 mg). The dosing interval has also changed from 2 pills every 4-6 hours to 2 pills every 6 hours.    If  "you feel your pain relief is insufficient, you may take Tylenol/Acetaminophen in addition to your narcotic pain medication.     Be careful not to exceed 3,000 mg of Tylenol/Acetaminophen in a 24 hour period from all sources.    If you are taking extra strength Tylenol/acetaminophen (500 mg), the maximum dose is 6 tablets in 24 hours.    If you are taking regular strength acetaminophen (325 mg), the maximum dose is 9 tablets in 24 hours.    Call a doctor for any of the followin. Signs of infection (fever, growing tenderness at the surgery site, a large amount of drainage or bleeding, severe pain, foul-smelling drainage, redness, swelling).  2. It has been over 8 to 10 hours since surgery and you are still not able to urinate (pass water).  3. Headache for over 24 hours.  4. Numbness, tingling or weakness the day after surgery (if you had spinal anesthesia).  Your doctor is:  Dr. Pricila Liang, Prostate and Urology: [phone]                    Or dial [phone] and ask for the resident on call for:  Prostate Urology  For emergency care, call the:  [facility]:  [phone] (TTY for hearing impaired: [phone])                Pending Results     Date and Time Order Name Status Description    2018 1247 Surgical pathology exam In process             Admission Information     Date & Time Provider Department Dept. Phone    2018 Pricila Liang MD [facility] [phone]      Your Vitals Were     Blood Pressure Pulse Temperature Respirations Height Weight    95/61 72 97.4  F (36.3  C) (Axillary) 14 1.6 m (5' 3\") 59 kg (130 lb)    Pulse Oximetry BMI (Body Mass Index)                98% 23.03 kg/m2          [employer] Information     [employer] gives you secure access to your electronic health record. If you see a primary care provider, you can also send messages to your care team and make appointments. If you have questions, please call your primary care " clinic.  If you do not have a primary care provider, please call [phone] and they will assist you.      [employer] is an electronic gateway that provides easy, online access to your medical records. With [employer], you can request a clinic appointment, read your test results, renew a prescription or communicate with your care team.     To access your existing account, please contact your [facility] or call [phone] for assistance.        Care EveryWhere ID     This is your Care EveryWhere ID. This could be used by other organizations to access your [facility] medical records  [id number]        Equal Access to Services     DONALD [facility] HealthJESSICA : Hadkatharina Lizarraga, wapaul zendejas, lalit brasher, lisa velez . So [facility] [phone].    ATENCIÓN: Si habla español, tiene a awad disposición servicios gratuitos de asistencia lingüística. [facility] [phone].    We comply with applicable federal civil rights laws and [place] laws. We do not discriminate on the basis of race, color, national origin, age, disability, sex, sexual orientation, or gender identity.               Review of your medicines      START taking        Dose / Directions    HYDROcodone-acetaminophen 5-325 MG per tablet   Commonly known as:  NORCO   Used for:  Postoperative pain        Dose:  1 tablet   Take 1 tablet by mouth every 4 hours as needed for pain   Quantity:  6 tablet   Refills:  0         CONTINUE these medicines which have NOT CHANGED        Dose / Directions    citalopram 10 MG tablet   Commonly known as:  celeXA   Used for:  Situational depression        TAKE 1 TABLET (10 MG) BY MOUTH DAILY   Quantity:  90 tablet   Refills:  3       COMPOUNDED NON-CONTROLLED SUBSTANCE - PHARMACY TO MIX COMPOUNDED MEDICATION   Commonly known as:  CMPD RX   Used for:  Atrophic vaginitis        estrodiol 1mg /gram-use dime size amount to the vagina 2-3 x a week at night    Quantity:  50 g   Refills:  3       DAILY MULTIVITAMIN PO        Dose:  1 tablet   Take 1 tablet by mouth daily.   Refills:  0       PRESERVISION/LUTEIN PO        Dose:  1 tablet   Take 1 tablet by mouth daily.   Refills:  0       UNABLE TO FIND   Indication:  once daily        MEDICATION NAME: Beyond Osteo Young Gevity  Liquid calcium   Refills:  0       UNKNOWN TO PATIENT   Indication:  Testosertone 0.1mg/ Estradiol cream apply vaginally        Hormone cream from Gyno-uses twice per week   Refills:  0       vitamin B complex with vitamin C Tabs tablet        Dose:  1 tablet   Take 1 tablet by mouth daily   Refills:  0            Where to get your medicines      Some of these will need a paper prescription and others can be bought over the counter. Ask your nurse if you have questions.     Bring a paper prescription for each of these medications     HYDROcodone-acetaminophen 5-325 MG per tablet                Protect others around you: Learn how to safely use, store and throw away your medicines at www.disposemymeds.org.        Information about OPIOIDS     PRESCRIPTION OPIOIDS: WHAT YOU NEED TO KNOW   We gave you an opioid (narcotic) pain medicine. It is important to manage your pain, but opioids are not always the best choice. You should first try all the other options your care team gave you. Take this medicine for as short a time (and as few doses) as possible.     These medicines have risks:    DO NOT drive when on new or higher doses of pain medicine. These medicines can affect your alertness and reaction times, and you could be arrested for driving under the influence (DUI). If you need to use opioids long-term, talk to your care team about driving.    DO NOT operate heave machinery    DO NOT do any other dangerous activities while taking these medicines.     DO NOT drink any alcohol while taking these medicines.      If the opioid prescribed includes acetaminophen, DO NOT take with any other medicines  that contain acetaminophen. Read all labels carefully. Look for the word  acetaminophen  or  Tylenol.  Ask your pharmacist if you have questions or are unsure.    You can get addicted to pain medicines, especially if you have a history of addiction (chemical, alcohol or substance dependence). Talk to your care team about ways to reduce this risk.    Store your pills in a secure place, locked if possible. We will not replace any lost or stolen medicine. If you don t finish your medicine, please throw away (dispose) as directed by your pharmacist. The [place] Pollution Control Agency has more information about safe disposal: https://www.pca.[facility].mn.us/living-green/managing-unwanted-medications.     All opioids tend to cause constipation. Drink plenty of water and eat foods that have a lot of fiber, such as fruits, vegetables, prune juice, apple juice and high-fiber cereal. Take a laxative (Miralax, milk of magnesia, Colace, Senna) if you don t move your bowels at least every other day.              Medication List: This is a list of all your medications and when to take them. Check marks below indicate your daily home schedule. Keep this list as a reference.      Medications           Morning Afternoon Evening Bedtime As Needed    citalopram 10 MG tablet   Commonly known as:  celeXA   TAKE 1 TABLET (10 MG) BY MOUTH DAILY                                COMPOUNDED NON-CONTROLLED SUBSTANCE - PHARMACY TO MIX COMPOUNDED MEDICATION   Commonly known as:  CMPD RX   estrodiol 1mg /gram-use dime size amount to the vagina 2-3 x a week at night                                DAILY MULTIVITAMIN PO   Take 1 tablet by mouth daily.                                PRESERVISION/LUTEIN PO   Take 1 tablet by mouth daily.                                HYDROcodone-acetaminophen 5-325 MG per tablet   Commonly known as:  NORCO   Take 1 tablet by mouth every 4 hours as needed for pain                                UNABLE TO FIND    MEDICATION NAME: Beyond Osteo Young Gevity  Liquid calcium                                UNKNOWN TO PATIENT   Hormone cream from Gyno-uses twice per week                                vitamin B complex with vitamin C Tabs tablet   Take 1 tablet by mouth daily

## 2018-08-06 NOTE — BRIEF OP NOTE
[facility]    Brief Operative Note    Pre-operative diagnosis: Bladder Lesion  Post-operative diagnosis Same  Procedure: Procedure(s):  Cystoscopy with Bladder Biopsy  (Choice Anesthesia) - Wound Class: II-Clean Contaminated  Surgeon: Surgeon(s) and Role:     * Pricila Liang MD - Primary  Anesthesia: Other   Estimated blood loss: Minimal  Drains: None  Specimens:   ID Type Source Tests Collected by Time Destination   A : Left Lateral Wall Biopsy Tissue Bladder SURGICAL PATHOLOGY EXAM Pricila Liang MD 8/6/2018 12:40 PM    B : Right Posterior Wall Biopsy Tissue Bladder SURGICAL PATHOLOGY EXAM Pricila Liang MD 8/6/2018 12:41 PM      Findings:   Irregularities noted at LLW and right posterior wall. No overt tumor seen. No stones or diverticuli or active bleeding.  Complications: None.  Implants: None.

## 2018-08-07 ENCOUNTER — PRE VISIT (OUTPATIENT)
Dept: UROLOGY | Facility: CLINIC | Age: 68
End: 2018-08-07

## 2018-08-07 ENCOUNTER — CARE COORDINATION (OUTPATIENT)
Dept: UROLOGY | Facility: CLINIC | Age: 68
End: 2018-08-07

## 2018-08-07 LAB — COPATH REPORT: NORMAL

## 2018-08-09 ENCOUNTER — OFFICE VISIT (OUTPATIENT)
Dept: UROLOGY | Facility: CLINIC | Age: 68
End: 2018-08-09
Payer: COMMERCIAL

## 2018-08-09 VITALS
BODY MASS INDEX: 24.27 KG/M2 | WEIGHT: 137 LBS | DIASTOLIC BLOOD PRESSURE: 61 MMHG | HEART RATE: 76 BPM | RESPIRATION RATE: 16 BRPM | SYSTOLIC BLOOD PRESSURE: 94 MMHG

## 2018-08-09 DIAGNOSIS — M62.89 PELVIC FLOOR DYSFUNCTION: ICD-10-CM

## 2018-08-09 DIAGNOSIS — N99.3 VAGINAL VAULT PROLAPSE AFTER HYSTERECTOMY: ICD-10-CM

## 2018-08-09 DIAGNOSIS — M79.18 MYALGIA OF PELVIC FLOOR: Primary | ICD-10-CM

## 2018-08-09 DIAGNOSIS — R32 URINARY INCONTINENCE, UNSPECIFIED TYPE: ICD-10-CM

## 2018-08-09 ASSESSMENT — PAIN SCALES - GENERAL: PAINLEVEL: NO PAIN (0)

## 2018-08-09 NOTE — PATIENT INSTRUCTIONS
Return to see us in about 3 months after restarting pelvic floor therapy    It was a pleasure meeting with you today.  Thank you for allowing me and my team the privilege of caring for you today.  YOU are the reason we are here, and I truly hope we provided you with the excellent service you deserve.  Please let us know if there is anything else we can do for you so that we can be sure you are leaving completely satisfied with your care experience.\

## 2018-08-09 NOTE — MR AVS SNAPSHOT
After Visit Summary   8/9/2018    Aleksandra Michel    MRN: [id number]           Patient Information     Date Of Birth          1950        Visit Information        Provider Department      8/9/2018 1:15 PM Pricila Liang MD [facility] Urology and [facility] for Prostate and Urologic Cancers        Today's Diagnoses     Myalgia of pelvic floor    -  1    Pelvic floor dysfunction        Vaginal vault prolapse after hysterectomy        Urinary incontinence, unspecified type          Care Instructions    Return to see us in about 3 months after restarting pelvic floor therapy    It was a pleasure meeting with you today.  Thank you for allowing me and my team the privilege of caring for you today.  YOU are the reason we are here, and I truly hope we provided you with the excellent service you deserve.  Please let us know if there is anything else we can do for you so that we can be sure you are leaving completely satisfied with your care experience.\          Follow-ups after your visit        Your next 10 appointments already scheduled     Aug 27, 2018  9:30 AM CDT   Return Visit with Trevor Mena MD, [facility] NURSE ONLY   [facility] ([facility]    [address] [place] [id number] [phone]            Sep 17, 2018 10:00 AM CDT   New Visit with Luli Dougherty MD   [facility] ([facility])    [address] [place] [id number]   [phone]            Nov 29, 2018  1:00 PM CST   (Arrive by 12:45 PM)   Return Visit with Pricila Liang MD   [facility] Urology and [facility] for Prostate and Urologic Cancers ([facility])    [address] [place] [id number] [phone]              Who to contact     Please call your clinic at [phone] to:    Ask questions about your health    Make or cancel appointments    Discuss your  medicines    Learn about your test results    Speak to your doctor            Additional Information About Your Visit        [employer] Information     [employer] gives you secure access to your electronic health record. If you see a primary care provider, you can also send messages to your care team and make appointments. If you have questions, please call your primary care clinic.  If you do not have a primary care provider, please call [phone] and they will assist you.      [employer] is an electronic gateway that provides easy, online access to your medical records. With [employer], you can request a clinic appointment, read your test results, renew a prescription or communicate with your care team.     To access your existing account, please contact your [facility] or call [phone] for assistance.        Care EveryWhere ID     This is your Care EveryWhere ID. This could be used by other organizations to access your [facility] medical records  [id number]        Your Vitals Were     Pulse Respirations Breastfeeding? BMI (Body Mass Index)          76 16 No 24.27 kg/m2         Blood Pressure from Last 3 Encounters:   08/09/18 94/61   08/06/18 111/69   08/02/18 100/70    Weight from Last 3 Encounters:   08/09/18 62.1 kg (137 lb)   08/06/18 59 kg (130 lb)   08/02/18 61.7 kg (136 lb 1.6 oz)              We Performed the Following     Fit/Insert Intravaginal Support Device/Pessary (93845)        Primary Care Provider Office Phone # Fax #    Stephany Elizabeth Hannon, APRN [facility] [phone]       [address]  [place] [address]        Equal Access to Services     MIRELLA DOMINGUEZ : Hadii aad ku hadasho Sojohana, waaxda luqadaha, qaybta kaalmada adeegyachaitanya, lisa velez . So [facility] [phone].    ATENCIÓN: Si habla español, tiene a awad disposición servicios gratuitos de asistencia lingüística. Llame al [phone].    We comply with applicable  federal civil rights laws and [place] laws. We do not discriminate on the basis of race, color, national origin, age, disability, sex, sexual orientation, or gender identity.            Thank you!     Thank you for choosing [facility] UROLOGY AND [facility] FOR PROSTATE AND UROLOGIC CANCERS  for your care. Our goal is always to provide you with excellent care. Hearing back from our patients is one way we can continue to improve our services. Please take a few minutes to complete the written survey that you may receive in the mail after your visit with us. Thank you!             Your Updated Medication List - Protect others around you: Learn how to safely use, store and throw away your medicines at www.disposemymeds.org.          This list is accurate as of 8/9/18  2:37 PM.  Always use your most recent med list.                   Brand Name Dispense Instructions for use Diagnosis    citalopram 10 MG tablet    celeXA    90 tablet    TAKE 1 TABLET (10 MG) BY MOUTH DAILY    Situational depression       COMPOUNDED NON-CONTROLLED SUBSTANCE - PHARMACY TO MIX COMPOUNDED MEDICATION    CMPD RX    50 g    estrodiol 1mg /gram-use dime size amount to the vagina 2-3 x a week at night    Atrophic vaginitis       DAILY MULTIVITAMIN PO      Take 1 tablet by mouth daily.        PRESERVISION/LUTEIN PO      Take 1 tablet by mouth daily.        HYDROcodone-acetaminophen 5-325 MG per tablet    NORCO    6 tablet    Take 1 tablet by mouth every 4 hours as needed for pain    Postoperative pain       UNABLE TO FIND      MEDICATION NAME: Beyond Osteo Young Gevity  Liquid calcium        UNKNOWN TO PATIENT      Hormone cream from Gyno-uses twice per week        vitamin B complex with vitamin C Tabs tablet      Take 1 tablet by mouth daily

## 2018-08-09 NOTE — LETTER
8/9/2018       RE: Aleksandra Michel  [address] [place] [address]     Dear Colleague,    Thank you for referring your patient, Aleksandra Michel, to the [facility] UROLOGY AND INST FOR PROSTATE AND UROLOGIC CANCERS at [facility]. Please see a copy of my visit note below.    August 9, 2018    Post operative visit    Patient returns today for follow up s/p bladder biopsy.  She is actually here today for a pessary fitting. She denies any changes in her health since last visit.    BP 94/61 (BP Location: Right arm, Patient Position: Sitting, Cuff Size: Adult Regular)  Pulse 76  Resp 16  Wt 62.1 kg (137 lb)  Breastfeeding? No  BMI 24.27 kg/m2  She is comfortable, in no distress, non-labored breathing.  Abdomen is soft, non-tender, non-distended.  Normal external female genitalia.  Negative CST.  She has a narrow introitus and TVL 3 and is unable to tolerate either a 0 or 1 pessary    SPECIMEN(S):   A: Left lateral wall biopsy   B: Right posterior wall biopsy     FINAL DIAGNOSIS:   A. Bladder, Left lateral wall biopsy:   - Benign urothelium     B. Bladder, Right posterior wall biopsy:   - Benign urothelium     A/P: 68 year old F with stage I vaginal vault prolapse, small volume urinary incontinence without sensory awareness, myofascial tenderness of the pelvic floor, pelvic floor dysfunction    She is unable to tolerate pessary    Benign biopsies    Return to pelvic floor therapy    RTC 3 months, sooner if needed    Pricila Liang MD MPH   of Urology    CC  Patient Care Team:  Claudio Hannon APRN CNP as PCP - General (Family Practice)  Pricila Liang MD as MD (Urology)  Claudio Hannon APRN CNP as Referring Physician (Family Practice)  Maria Elena Guzman, FREEMAN as Registered Nurse (Urology)  CLAUDIO HANNON

## 2018-08-09 NOTE — NURSING NOTE
Chief Complaint   Patient presents with     Pessary Check/Fit/Insert     Patient is here for pessary fitting     Christi Clement CMA 1:29 PM on 8/9/2018.

## 2018-08-09 NOTE — PROGRESS NOTES
August 9, 2018    Post operative visit    Patient returns today for follow up s/p bladder biopsy.  She is actually here today for a pessary fitting. She denies any changes in her health since last visit.    BP 94/61 (BP Location: Right arm, Patient Position: Sitting, Cuff Size: Adult Regular)  Pulse 76  Resp 16  Wt 62.1 kg (137 lb)  Breastfeeding? No  BMI 24.27 kg/m2  She is comfortable, in no distress, non-labored breathing.  Abdomen is soft, non-tender, non-distended.  Normal external female genitalia.  Negative CST.  She has a narrow introitus and TVL 3 and is unable to tolerate either a 0 or 1 pessary    SPECIMEN(S):   A: Left lateral wall biopsy   B: Right posterior wall biopsy     FINAL DIAGNOSIS:   A. Bladder, Left lateral wall biopsy:   - Benign urothelium     B. Bladder, Right posterior wall biopsy:   - Benign urothelium     A/P: 68 year old F with stage I vaginal vault prolapse, small volume urinary incontinence without sensory awareness, myofascial tenderness of the pelvic floor, pelvic floor dysfunction    She is unable to tolerate pessary    Benign biopsies    Return to pelvic floor therapy    RTC 3 months, sooner if needed    Pricila Liang MD MPH   of Urology    CC  Patient Care Team:  Claudio Hannon APRN CNP as PCP - General (Family Practice)  Pricila Liang MD as MD (Urology)  Claudio Hannon APRN CNP as Referring Physician (Family Practice)  Maria Elena Guzman, FREEMAN as Registered Nurse (Urology)  CLAUDIO HANNON

## 2018-08-27 ENCOUNTER — OFFICE VISIT (OUTPATIENT)
Dept: OPHTHALMOLOGY | Facility: CLINIC | Age: 68
End: 2018-08-27
Payer: COMMERCIAL

## 2018-08-27 DIAGNOSIS — Z98.890 S/P LASIK SURGERY: ICD-10-CM

## 2018-08-27 DIAGNOSIS — H25.13 SENILE NUCLEAR SCLEROSIS, BILATERAL: Primary | ICD-10-CM

## 2018-08-27 DIAGNOSIS — H52.4 MYOPIA OF BOTH EYES WITH ASTIGMATISM AND PRESBYOPIA: ICD-10-CM

## 2018-08-27 DIAGNOSIS — H52.13 MYOPIA OF BOTH EYES WITH ASTIGMATISM AND PRESBYOPIA: ICD-10-CM

## 2018-08-27 DIAGNOSIS — H52.203 MYOPIA OF BOTH EYES WITH ASTIGMATISM AND PRESBYOPIA: ICD-10-CM

## 2018-08-27 PROCEDURE — 92015 DETERMINE REFRACTIVE STATE: CPT | Performed by: OPHTHALMOLOGY

## 2018-08-27 PROCEDURE — 92014 COMPRE OPH EXAM EST PT 1/>: CPT | Performed by: OPHTHALMOLOGY

## 2018-08-27 ASSESSMENT — VISUAL ACUITY
OD_SC+: -2
OS_SC: 20/60
OS_CC: 20/25
OD_CC+: -2
OD_SC: 20/25
METHOD: SNELLEN - LINEAR
OD_CC: 20/25
OS_CC+: -2

## 2018-08-27 ASSESSMENT — REFRACTION_WEARINGRX
SPECS_TYPE: SVL DISTANCE
OS_CYLINDER: +1.00
OS_SPHERE: -2.00
OD_SPHERE: -0.25
OS_AXIS: 179
OD_AXIS: 180
OD_CYLINDER: +1.00

## 2018-08-27 ASSESSMENT — TONOMETRY
OS_IOP_MMHG: 16
OD_IOP_MMHG: 16
IOP_METHOD: TONOPEN

## 2018-08-27 ASSESSMENT — REFRACTION_MANIFEST
OD_AXIS: 175
OD_SPHERE: PLANO
OD_ADD: +2.50
OS_AXIS: 015
OS_ADD: +2.50
OS_SPHERE: -1.00
OD_CYLINDER: +1.50
OS_CYLINDER: +0.50

## 2018-08-27 ASSESSMENT — EXTERNAL EXAM - LEFT EYE: OS_EXAM: NORMAL

## 2018-08-27 ASSESSMENT — SLIT LAMP EXAM - LIDS
COMMENTS: NORMAL
COMMENTS: NORMAL

## 2018-08-27 ASSESSMENT — EXTERNAL EXAM - RIGHT EYE: OD_EXAM: NORMAL

## 2018-08-27 ASSESSMENT — CUP TO DISC RATIO
OD_RATIO: 0.3
OS_RATIO: 0.3

## 2018-08-27 ASSESSMENT — CONF VISUAL FIELD
OS_NORMAL: 1
OD_NORMAL: 1

## 2018-08-27 NOTE — PROGRESS NOTES
Assessment & Plan   Aleksandra Michel is a 68 year old female who presents with:   Review of systems for the eyes was negative other than the pertinent positives and negatives noted in the HPI.  History is obtained from the patient.    Chief Complaint   Patient presents with     COMPREHENSIVE EYE EXAM     Decrease in VA.     Cataract Evaluation     Was had a catacract last year.  HX lasik OD       Senile nuclear sclerosis, bilateral  - Not visually significant, continue to observe    Myopia of both eyes with astigmatism and presbyopia  - Rx per MR for glasses (optional)  - REFRACTION    Early stages of Macular degeneration  - photos today   - OCT    S/P LASIK surgery - Right Eye      Return in about 1 year (around 8/27/2019) for Annual Eye Exam.    Documentation for today's encounter was performed by Gracy Arias COA. OSC. Acting as a scribe in my presence. I have reviewed and verified that it is an accurate recording of today's encounter.    Attending Physician Attestation:  Complete documentation of historical and exam elements from today's encounter can be found in the full encounter summary report (not reduplicated in this progress note).  I personally obtained the chief complaint(s) and history of present illness.  I confirmed and edited as necessary the review of systems, past medical/surgical history, family history, social history, and examination findings as documented by others; and I examined the patient myself.  I personally reviewed the relevant tests, images, and reports as documented above.  I formulated and edited as necessary the assessment and plan and discussed the findings and management plan with the patient and family. - Trevor Mena MD

## 2018-08-27 NOTE — NURSING NOTE
Patient presents with:  COMPREHENSIVE EYE EXAM: Decrease in VA.  Cataract Evaluation: Was had a catacract last year.       Referring Provider:  Referred Self, MD  No address on file    HPI    Last Eye Exam:  5/17/17   Informant(s):  EMR   Affected eye(s):  Both   Symptoms:     Decreased vision   Difficulty with reading      Duration:  6 months   Frequency:  Daily       Do you have eye pain now?:  No      Comments:  Pt denies any gtts.              Shannon Cordoba COT

## 2018-08-27 NOTE — MR AVS SNAPSHOT
After Visit Summary   8/27/2018    Aleksandra Michel    MRN: [id number]           Patient Information     Date Of Birth          1950        Visit Information        Provider Department      8/27/2018 9:30 AM Trevor Mena MD;  OPHTH NURSE ONLY [facility]        Today's Diagnoses     Senile nuclear sclerosis, bilateral    -  1    Myopia of both eyes with astigmatism and presbyopia        S/P LASIK surgery - Right Eye           Follow-ups after your visit        Follow-up notes from your care team     Return in about 1 year (around 8/27/2019) for Annual Eye Exam.      Your next 10 appointments already scheduled     Sep 17, 2018 10:00 AM CDT   New Visit with Luli Dougherty MD   [facility]    [address] [place] [id number] [phone]            Nov 29, 2018  1:00 PM CST   (Arrive by 12:45 PM)   Return Visit with Pricila Liang MD   [facility] ([facility])    [address] [place] [address] [phone]              Who to contact     If you have questions or need follow up information about today's clinic visit or your schedule please contact [facility] directly at [phone].  Normal or non-critical lab and imaging results will be communicated to you by MyChart, letter or phone within 4 business days after the clinic has received the results. If you do not hear from us within 7 days, please contact the clinic through MyChart or phone. If you have a critical or abnormal lab result, we will notify you by phone as soon as possible.  Submit refill requests through [email or web address] or call your pharmacy and they will forward the refill request to us. Please allow 3 business days for your refill to be completed.          Additional Information About Your Visit        MyChart  Information     SendMe gives you secure access to your electronic health record. If you see a primary care provider, you can also send messages to your care team and make appointments. If you have questions, please call your primary care clinic.  If you do not have a primary care provider, please call [phone] and they will assist you.      SendMe is an electronic gateway that provides easy, online access to your medical records. With SendMe, you can request a clinic appointment, read your test results, renew a prescription or communicate with your care team.     To access your existing account, please contact your [facility] or call [phone] for assistance.        Care EveryWhere ID     This is your Care EveryWhere ID. This could be used by other organizations to access your [place] medical records  [id number]         Blood Pressure from Last 3 Encounters:   08/09/18 94/61   08/06/18 111/69   08/02/18 100/70    Weight from Last 3 Encounters:   08/09/18 62.1 kg (137 lb)   08/06/18 59 kg (130 lb)   08/02/18 61.7 kg (136 lb 1.6 oz)              We Performed the Following     EYE EXAM, EST PATIENT,COMPREHESV     REFRACTION        Primary Care Provider Office Phone # Fax #    Stephany Elizabeth Hannon, APRN [facility] [phone]       [address]  [place] [address]        Equal Access to Services     MIRELLA DOMINGUEZ : Hadii aad ku hadasho Soomaali, waaxda luqadaha, qaybta kaalmada adeegyada, lisa velez . So [facility] [phone].    ATENCIÓN: Si habla español, tiene a awad disposición servicios gratuitos de asistencia lingüística. Yoon al [phone].    We comply with applicable federal civil rights laws and [place] laws. We do not discriminate on the basis of race, color, national origin, age, disability, sex, sexual orientation, or gender identity.            Thank you!     Thank you for choosing [facility]  for  your care. Our goal is always to provide you with excellent care. Hearing back from our patients is one way we can continue to improve our services. Please take a few minutes to complete the written survey that you may receive in the mail after your visit with us. Thank you!             Your Updated Medication List - Protect others around you: Learn how to safely use, store and throw away your medicines at www.disposemymeds.org.          This list is accurate as of 8/27/18 11:59 PM.  Always use your most recent med list.                   Brand Name Dispense Instructions for use Diagnosis    citalopram 10 MG tablet    celeXA    90 tablet    TAKE 1 TABLET (10 MG) BY MOUTH DAILY    Situational depression       COMPOUNDED NON-CONTROLLED SUBSTANCE - PHARMACY TO MIX COMPOUNDED MEDICATION    CMPD RX    50 g    estrodiol 1mg /gram-use dime size amount to the vagina 2-3 x a week at night    Atrophic vaginitis       DAILY MULTIVITAMIN PO      Take 1 tablet by mouth daily.        PRESERVISION/LUTEIN PO      Take 1 tablet by mouth daily.        HYDROcodone-acetaminophen 5-325 MG per tablet    NORCO    6 tablet    Take 1 tablet by mouth every 4 hours as needed for pain    Postoperative pain       UNABLE TO FIND      MEDICATION NAME: [facility] Young Gevity  Liquid calcium        UNKNOWN TO PATIENT      Hormone cream from Gyno-uses twice per week        vitamin B complex with vitamin C Tabs tablet      Take 1 tablet by mouth daily

## 2018-09-25 ENCOUNTER — HEALTH MAINTENANCE LETTER (OUTPATIENT)
Age: 68
End: 2018-09-25

## 2018-11-09 ENCOUNTER — TELEPHONE (OUTPATIENT)
Dept: PEDIATRICS | Facility: CLINIC | Age: 68
End: 2018-11-09

## 2018-11-09 DIAGNOSIS — F43.21 SITUATIONAL DEPRESSION: ICD-10-CM

## 2018-11-12 RX ORDER — CITALOPRAM HYDROBROMIDE 10 MG/1
TABLET ORAL
Qty: 90 TABLET | Refills: 2 | Status: SHIPPED | OUTPATIENT
Start: 2018-11-12 | End: 2020-12-04

## 2018-11-12 NOTE — TELEPHONE ENCOUNTER
Routing refill request to provider for review/approval because:      PHQ-9 score:    PHQ-9 SCORE 12/30/2016   Total Score 6       Last office visit:  8/2/18        citalopram (CELEXA) 10 MG tablet 90 tablet 3 9/18/2017  No      Sig: TAKE 1 TABLET (10 MG) BY MOUTH DAILY     Gwen Richmond RN,   [facility], [facility]

## 2018-11-12 NOTE — TELEPHONE ENCOUNTER
PHQ-9 SCORE 12/30/2016   Total Score 6       TAYLOR-7 SCORE 12/30/2016   Total Score 6     Refill completed.   Please update PHQ 9 and TAYLOR  Thanks

## 2018-11-12 NOTE — TELEPHONE ENCOUNTER
Patient Contact    Attempt # 1    Sent Fluid-1 message in new encounter with attached questionnaires. Will monitor for patient review and response.  Tran REYNOSO CMA

## 2018-11-13 ASSESSMENT — PATIENT HEALTH QUESTIONNAIRE - PHQ9
SUM OF ALL RESPONSES TO PHQ QUESTIONS 1-9: 2
5. POOR APPETITE OR OVEREATING: NOT AT ALL

## 2018-11-13 ASSESSMENT — ANXIETY QUESTIONNAIRES
GAD7 TOTAL SCORE: 2
6. BECOMING EASILY ANNOYED OR IRRITABLE: NOT AT ALL
2. NOT BEING ABLE TO STOP OR CONTROL WORRYING: NOT AT ALL
3. WORRYING TOO MUCH ABOUT DIFFERENT THINGS: SEVERAL DAYS
5. BEING SO RESTLESS THAT IT IS HARD TO SIT STILL: NOT AT ALL
7. FEELING AFRAID AS IF SOMETHING AWFUL MIGHT HAPPEN: NOT AT ALL
IF YOU CHECKED OFF ANY PROBLEMS ON THIS QUESTIONNAIRE, HOW DIFFICULT HAVE THESE PROBLEMS MADE IT FOR YOU TO DO YOUR WORK, TAKE CARE OF THINGS AT HOME, OR GET ALONG WITH OTHER PEOPLE: SOMEWHAT DIFFICULT
1. FEELING NERVOUS, ANXIOUS, OR ON EDGE: SEVERAL DAYS

## 2018-11-13 NOTE — TELEPHONE ENCOUNTER
Mychart message unread by patient.    Called patient on home number to review.  PHQ-9 SCORE 12/30/2016 11/13/2018   Total Score 6 2     TAYLOR-7 SCORE 12/30/2016 11/13/2018   Total Score 6 2       Routed to Stephany Hannon NP, APRN CNP for review.  Tran REYNOSO, CMA

## 2018-11-13 NOTE — TELEPHONE ENCOUNTER
PHQ-9 SCORE 12/30/2016 11/13/2018   Total Score 6 2       TAYLOR-7 SCORE 12/30/2016 11/13/2018   Total Score 6 2     Noted

## 2018-11-14 ASSESSMENT — ANXIETY QUESTIONNAIRES: GAD7 TOTAL SCORE: 2

## 2018-11-20 ENCOUNTER — TELEPHONE (OUTPATIENT)
Dept: UROLOGY | Facility: CLINIC | Age: 68
End: 2018-11-20

## 2018-12-04 ENCOUNTER — TELEPHONE (OUTPATIENT)
Dept: UROLOGY | Facility: CLINIC | Age: 68
End: 2018-12-04

## 2018-12-04 NOTE — TELEPHONE ENCOUNTER
[facility] Call Center    Phone Message    May a detailed message be left on voicemail: yes    Reason for Call: Other: patient is calling because she is seeing physical therapy on 12/6 and Medicare is stating they will not cover it unless there is an order from Dr. Liang.  She is requesting an order be put in for physical therapy.  Please advise.     Action Taken: Message routed to:  Adult Clinics: Urology p [phone]

## 2018-12-05 NOTE — TELEPHONE ENCOUNTER
Pt returned call and informed pt Dr. Liang is not in the clinic until 12/11/18 so orders unable to be placed until that time. Pt stated understanding.    Vane Gutierres LPN

## 2018-12-10 NOTE — TELEPHONE ENCOUNTER
Message sent to Dr. Liang to review and place physical therapy referral.    Patricia Singleton RN, BSN

## 2018-12-11 DIAGNOSIS — M79.18 MYALGIA OF PELVIC FLOOR: ICD-10-CM

## 2018-12-11 DIAGNOSIS — R32 URINARY INCONTINENCE, UNSPECIFIED TYPE: ICD-10-CM

## 2018-12-11 DIAGNOSIS — M62.89 PELVIC FLOOR DYSFUNCTION: Primary | ICD-10-CM

## 2018-12-11 NOTE — TELEPHONE ENCOUNTER
Discussed with Dr. Liang and physical therapy order placed today. Called and spoke to patient who is aware of the above information. Patient reports that she will call to schedule physical therapy and a follow up visit with Dr. Liang at a later date.    Patricia Singleton RN, BSN

## 2019-01-09 ENCOUNTER — THERAPY VISIT (OUTPATIENT)
Dept: PHYSICAL THERAPY | Facility: CLINIC | Age: 69
End: 2019-01-09
Payer: MEDICARE

## 2019-01-09 DIAGNOSIS — M99.05 SOMATIC DYSFUNCTION OF PELVIC REGION: ICD-10-CM

## 2019-01-09 DIAGNOSIS — R32 URINARY INCONTINENCE: Primary | ICD-10-CM

## 2019-01-09 PROCEDURE — 90901 BIOFEEDBACK TRAIN ANY METH: CPT | Mod: GP | Performed by: PHYSICAL THERAPIST

## 2019-01-09 PROCEDURE — 97161 PT EVAL LOW COMPLEX 20 MIN: CPT | Mod: GP | Performed by: PHYSICAL THERAPIST

## 2019-01-09 PROCEDURE — 97140 MANUAL THERAPY 1/> REGIONS: CPT | Mod: GP | Performed by: PHYSICAL THERAPIST

## 2019-01-09 NOTE — PROGRESS NOTES
[place] [facility] Initial Evaluation  Subjective:  The history is provided by the patient. No  was used.   Aleksandra Michel is a 68 year old female with a pelvic dysfunction condition.  Condition occurred with:  Other reason.    This is a chronic condition  In the past had a Mono Kae procedure but this caused vaginal tissue restriction.  Patient reports she followed up with MD 8/9/18 and further PT was recommended. Is unsure if her electrical stimulation unit it working-there are times she can feel it versus days she cannot not.  Advised to check with current technology to see why it is not working.  There are times she is still leaking dribbles; more so if coughing.  Has not been doing dilator work -did it consistently in the past and did notice a difference..    Patient reports pain:  N/a.    Quality: none. Episode frequency: none. Pain Scale: none.     Symptoms are exacerbated by coughing and relieved by nothing.  Since onset symptoms are unchanged.    Previous treatment includes physical therapy.  There was mild improvement following previous treatment.  General health as reported by patient is good.  Pertinent medical history includes:  None.  Medical allergies: yes.  Other surgeries include:  None.  Current medications:  Anti-depressants.  Current occupation is .        Barriers include:  None as reported by the patient.    Red flags:  None as reported by the patient.                        Objective:  System                                 Pelvic Dysfunction Evaluation:      Diagnostic Tests:    Pelvic Exam:  X          Cystoscopy:  X                Pelvic Clock Exam:  Pelvic clock exam: Tenderness nilateral OI/LA musculature-depth 1 finger width.        Levator ANI:  + and +/-        External Assessment:    Skin Condition:  Atrophic      Tissue Symmetry:  Normal      Internal Assessment:    Sensory Exam:  Normal  Contraction/Grade:  Good squeeze, good hold with lift,  "repeatable (4)    Accessory Muscle use-Gluteals:  X  Accessory Muscle use-Adductors:  X    SEMG Biofeedback:    Equipment:  MR-25      Baseline EMG PM:  3.02uV  Baseline EMG Abdominals:  2\" hold 15.6uV/+6.81W-R (resting 8.79uV)  10\" hold 10.9uV/+7.97W-R      EMG interpretation to fatigue:  5-8 seconds  Position:  SupineAdditional History:    Number of Pregnancies: 2                         General     ROS    Assessment/Plan:    Patient is a 68 year old female with pelvic complaints.    Patient has the following significant findings with corresponding treatment plan.                Diagnosis 1:  Pelvic dysfuntion  Pain -  manual therapy, self management and education  Decreased strength - therapeutic exercise and therapeutic activities  Impaired muscle performance - neuro re-education  Decreased function - therapeutic activities    Therapy Evaluation Codes:   1) History comprised of:   Personal factors that impact the plan of care:      Time since onset of symptoms.    Comorbidity factors that impact the plan of care are:      None.     Medications impacting care: None.  2) Examination of Body Systems comprised of:   Body structures and functions that impact the plan of care:      Pelvis.   Activity limitations that impact the plan of care are:      Urinary incontinence.  3) Clinical presentation characteristics are:   Stable/Uncomplicated.  4) Decision-Making    Low complexity using standardized patient assessment instrument and/or measureable assessment of functional outcome.  Cumulative Therapy Evaluation is: Low complexity.    Previous and current functional limitations:  (See Goal Flow Sheet for this information)    Short term and Long term goals: (See Goal Flow Sheet for this information)     Communication ability:  Patient appears to be able to clearly communicate and understand verbal and written communication and follow directions correctly.  Treatment Explanation - The following has been discussed with the " patient:   RX ordered/plan of care  Anticipated outcomes  Possible risks and side effects  This patient would benefit from PT intervention to resume normal activities.   Rehab potential is good.    Frequency:  1 X week, once daily  Duration:  for 12 weeks  Discharge Plan:  Achieve all LTG.  Independent in home treatment program.  Reach maximal therapeutic benefit.    Please refer to the daily flowsheet for treatment today, total treatment time and time spent performing 1:1 timed codes.

## 2019-01-09 NOTE — LETTER
DEPARTMENT OF HEALTH AND HUMAN SERVICES  CENTERS FOR MEDICARE & MEDICAID SERVICES    PLAN/UPDATED PLAN OF PROGRESS FOR OUTPATIENT REHABILITATION    PATIENTS NAME:  Aleksandra Michel     : 1950    PROVIDER NUMBER:    [id number]    HICN:  [id number]    PROVIDER NAME: [facility]    MEDICAL RECORD NUMBER: [id number]     START OF CARE DATE:  SOC Date: 19   TYPE:  PT    PRIMARY/TREATMENT DIAGNOSIS: (Pertinent Medical Diagnosis)     Somatic dysfunction of pelvic region  Urinary incontinence    VISITS FROM START OF CARE:  Rxs Used: 1     [place] [facility] Initial Evaluation    Subjective:  The history is provided by the patient. No  was used.   Aleksandra Michel is a 68 year old female with a pelvic dysfunction condition.  Condition occurred with:  Other reason.  This is a chronic condition  In the past had a Mono Kae procedure but this caused vaginal tissue restriction.      Patient reports she followed up with MD 18 and further PT was recommended. Is unsure if her electrical stimulation unit it working-there are times she can feel it versus days she cannot not.  Advised to check with current technology to see why it is not working.  There are times she is still leaking dribbles; more so if coughing.  Has not been doing dilator work -did it consistently in the past and did notice a difference.      Patient reports pain:  N/a.  Quality: none. Episode frequency: none. Pain Scale: none.     Symptoms are exacerbated by coughing and relieved by nothing.  Since onset symptoms are unchanged.  Previous treatment includes physical therapy.  There was mild improvement following previous treatment.  General health as reported by patient is good.      Pertinent medical history includes:  None.  Medical allergies: yes.  Other surgeries include:  None.  Current medications:  Anti-depressants.  Current occupation is .        Barriers include:   "None as reported by the patient.    Red flags:  None as reported by the patient.      Objective:  Pelvic Dysfunction Evaluation:      Diagnostic Tests:    Pelvic Exam:  X    Cystoscopy:  X    Pelvic Clock Exam:  Pelvic clock exam: Tenderness nilateral OI/LA musculature-depth 1 finger width.    Levator ANI:  + and +/-    External Assessment:    Skin Condition:  Atrophic    Tissue Symmetry:  Normal    Internal Assessment:    Sensory Exam:  Normal  Contraction/Grade:  Good squeeze, good hold with lift, repeatable (4)    Accessory Muscle use-Gluteals:  X  Accessory Muscle use-Adductors:  X    SEMG Biofeedback:    Equipment:  MR-25    Baseline EMG PM:  3.02uV  Baseline EMG Abdominals:  2\" hold 15.6uV/+6.81W-R (resting 8.79uV)  10\" hold 10.9uV/+7.97W-R    EMG interpretation to fatigue:  5-8 seconds  Position:  Supine    Additional History:Number of Pregnancies: 2    Assessment/Plan:    Patient is a 68 year old female with pelvic complaints.    Patient has the following significant findings with corresponding treatment plan.                  Diagnosis 1:  Pelvic dysfuntion  Pain -  manual therapy, self management and education  Decreased strength - therapeutic exercise and therapeutic activities  Impaired muscle performance - neuro re-education  Decreased function - therapeutic activities    Therapy Evaluation Codes:   1) History comprised of:   Personal factors that impact the plan of care:      Time since onset of symptoms.    Comorbidity factors that impact the plan of care are:      None.     Medications impacting care: None.  2) Examination of Body Systems comprised of:   Body structures and functions that impact the plan of care:      Pelvis.   Activity limitations that impact the plan of care are:      Urinary incontinence.  3) Clinical presentation characteristics are:   Stable/Uncomplicated.  4) Decision-Making    Low complexity using standardized patient assessment instrument and/or measureable assessment of " "functional outcome.  Cumulative Therapy Evaluation is: Low complexity.    Previous and current functional limitations:  (See Goal Flow Sheet for this information)    Short term and Long term goals: (See Goal Flow Sheet for this information)     Communication ability:  Patient appears to be able to clearly communicate and understand verbal and written communication and follow directions correctly.    Treatment Explanation - The following has been discussed with the patient:   RX ordered/plan of care  Anticipated outcomes  Possible risks and side effects    This patient would benefit from PT intervention to resume normal activities.   Rehab potential is good.    Frequency:  1 X week, once daily  Duration:  for 12 weeks  Discharge Plan:  Achieve all LTG.  Independent in home treatment program.  Reach maximal therapeutic benefit.    Please refer to the daily flowsheet for treatment today, total treatment time and time spent performing 1:1 timed codes.     Caregiver Signature/Credentials _____________________________ Date ________       Treating Provider: Patricia Velazquez DPT     I have reviewed and certified the need for these services and plan of treatment while under my care.      PHYSICIAN'S SIGNATURE:   _____________________________________  Date___________   Pricila See Yg Liang MD    Certification period:  Beginning of Cert date period: 01/09/19 to  End of Cert period date: 09/17/20     Functional Level Progress Report: Please see attached \"Goal Flow sheet for Functional level.\"    ____X____ Continue Services or       ________ DC Services                Service dates: From  SOC Date: 01/09/19 date to present                         "

## 2019-01-10 PROBLEM — M99.05 SOMATIC DYSFUNCTION OF PELVIC REGION: Status: ACTIVE | Noted: 2018-03-12

## 2019-01-22 ENCOUNTER — OFFICE VISIT (OUTPATIENT)
Dept: UROLOGY | Facility: CLINIC | Age: 69
End: 2019-01-22
Payer: COMMERCIAL

## 2019-01-22 VITALS
SYSTOLIC BLOOD PRESSURE: 100 MMHG | DIASTOLIC BLOOD PRESSURE: 66 MMHG | OXYGEN SATURATION: 95 % | HEART RATE: 77 BPM | BODY MASS INDEX: 25.8 KG/M2 | HEIGHT: 63 IN | WEIGHT: 145.6 LBS

## 2019-01-22 DIAGNOSIS — N81.9 VAGINAL VAULT PROLAPSE: Primary | ICD-10-CM

## 2019-01-22 DIAGNOSIS — M62.89 PELVIC FLOOR DYSFUNCTION: ICD-10-CM

## 2019-01-22 DIAGNOSIS — R32 URINARY INCONTINENCE, UNSPECIFIED TYPE: ICD-10-CM

## 2019-01-22 PROCEDURE — 99212 OFFICE O/P EST SF 10 MIN: CPT | Performed by: UROLOGY

## 2019-01-22 ASSESSMENT — PAIN SCALES - GENERAL: PAINLEVEL: NO PAIN (0)

## 2019-01-22 ASSESSMENT — MIFFLIN-ST. JEOR: SCORE: 1149.57

## 2019-01-22 NOTE — PATIENT INSTRUCTIONS
Continue pelvic floor therapy and the Estim unit    Return to see use in 6 months, one year if doing well, sooner if needed    To schedule an appointment in [place] [phone]    It was a pleasure meeting with you today.  Thank you for allowing me and my team the privilege of caring for you today.  YOU are the reason we are here, and I truly hope we provided you with the excellent service you deserve.  Please let us know if there is anything else we can do for you so that we can be sure you are leaving completely satisfied with your care experience.

## 2019-01-22 NOTE — NURSING NOTE
"Aleksandra Michel's goals for this visit include:   Chief Complaint   Patient presents with     RECHECK     Following up after biopsy        She requests these members of her care team be copied on today's visit information: yes    PCP: Stephany Hannon    Referring Provider:  No referring provider defined for this encounter.    /66   Pulse 77   Ht 1.6 m (5' 3\")   Wt 66 kg (145 lb 9.6 oz)   SpO2 95%   BMI 25.79 kg/m      Do you need any medication refills at today's visit? No    Xavier Connolly CMA      "

## 2019-06-07 ENCOUNTER — OFFICE VISIT (OUTPATIENT)
Dept: URGENT CARE | Facility: URGENT CARE | Age: 69
End: 2019-06-07
Payer: COMMERCIAL

## 2019-06-07 ENCOUNTER — ANCILLARY PROCEDURE (OUTPATIENT)
Dept: GENERAL RADIOLOGY | Facility: CLINIC | Age: 69
End: 2019-06-07
Attending: FAMILY MEDICINE
Payer: COMMERCIAL

## 2019-06-07 VITALS
SYSTOLIC BLOOD PRESSURE: 115 MMHG | DIASTOLIC BLOOD PRESSURE: 75 MMHG | HEART RATE: 60 BPM | TEMPERATURE: 98.3 F | OXYGEN SATURATION: 98 %

## 2019-06-07 DIAGNOSIS — M25.531 RIGHT WRIST PAIN: ICD-10-CM

## 2019-06-07 DIAGNOSIS — S52.121A CLOSED DISPLACED FRACTURE OF HEAD OF RIGHT RADIUS, INITIAL ENCOUNTER: Primary | ICD-10-CM

## 2019-06-07 PROCEDURE — 73110 X-RAY EXAM OF WRIST: CPT | Mod: RT

## 2019-06-07 PROCEDURE — 99213 OFFICE O/P EST LOW 20 MIN: CPT | Performed by: FAMILY MEDICINE

## 2019-06-08 NOTE — PROGRESS NOTES
68 yo female fell down on her right wrist.    Right handed.    Fell down from the stool. Here with her .     No history of broken bone. History of osteoporosis.     No finger numbness.     Social History     Occupational History     Not on file   Tobacco Use     Smoking status: Former Smoker     Years: 4.00     Types: Cigarettes     Smokeless tobacco: Never Used   Substance and Sexual Activity     Alcohol use: Yes     Comment: occasional glass of wine     Drug use: No     Sexual activity: Yes     Partners: Male     Allergies   Allergen Reactions     Dust Mites      Mold      Seasonal Allergies      Patient Active Problem List   Diagnosis     Colon polyp     Seasonal allergic rhinitis     Osteoporosis without current pathological fracture     CARDIOVASCULAR SCREENING; LDL GOAL LESS THAN 160     Advanced directives, counseling/discussion     Varicose veins of both lower extremities     Somatic dysfunction of pelvic region     Urinary incontinence     Vaginal vault prolapse     Pelvic floor dysfunction     Reviewed medications, social history and  past medical and surgical history.    Review of system: for general, respiratory, CVS, GI and psychiatry negative except for noted above.     EXAM:  /75   Pulse 60   Temp 98.3  F (36.8  C) (Oral)   SpO2 98%   Constitutional: healthy, alert and no distress   Psychiatric: mentation appears normal and affect normal/bright  Right wrist examined.  On icepack.  Distal pulses intact and symmetrical.  Sensation and strength of all fingers are normal.  Radial had diffuse tenderness upon light touch.  Mild deformity present.    ASSESSMENT / PLAN:  (N05.478D) Closed displaced fracture of head of right radius, initial encounter  (primary encounter diagnosis)  Comment: Has osteoporosis diagnosis.  Plan: We reviewed x-rays together.  We will ask her to walk into [facility] walk-in clinic.  I called them and I have access to our x-ray.    (Q29.361) Right wrist  pain  Comment:    Plan: XR Wrist Right G/E 3 Views                 The above note was dictated using voice recognition. Although reviewed after completion, some word and grammatical error may remain .

## 2019-08-12 PROBLEM — M99.05 SOMATIC DYSFUNCTION OF PELVIC REGION: Status: RESOLVED | Noted: 2018-03-12 | Resolved: 2019-08-12

## 2019-08-12 PROBLEM — R32 URINARY INCONTINENCE: Status: RESOLVED | Noted: 2018-08-02 | Resolved: 2019-08-12

## 2020-02-27 ENCOUNTER — TRANSFERRED RECORDS (OUTPATIENT)
Dept: HEALTH INFORMATION MANAGEMENT | Facility: CLINIC | Age: 70
End: 2020-02-27

## 2020-03-02 ENCOUNTER — HEALTH MAINTENANCE LETTER (OUTPATIENT)
Age: 70
End: 2020-03-02

## 2020-03-19 ENCOUNTER — TRANSFERRED RECORDS (OUTPATIENT)
Dept: HEALTH INFORMATION MANAGEMENT | Facility: CLINIC | Age: 70
End: 2020-03-19

## 2020-11-16 ENCOUNTER — HOSPITAL ENCOUNTER (OUTPATIENT)
Dept: MAMMOGRAPHY | Facility: CLINIC | Age: 70
Discharge: HOME | End: 2020-11-16
Attending: NURSE PRACTITIONER | Admitting: NURSE PRACTITIONER
Payer: COMMERCIAL

## 2020-11-16 DIAGNOSIS — Z12.31 VISIT FOR SCREENING MAMMOGRAM: ICD-10-CM

## 2020-11-16 PROCEDURE — 77063 BREAST TOMOSYNTHESIS BI: CPT

## 2020-11-17 ENCOUNTER — TELEPHONE (OUTPATIENT)
Dept: PEDIATRICS | Facility: CLINIC | Age: 70
End: 2020-11-17

## 2020-11-17 NOTE — TELEPHONE ENCOUNTER
[facility] Call Center    Phone Message    May a detailed message be left on voicemail: yes     Reason for Call: Requesting Results   Name/type of test: Mammogram   Date of test: 11/16/20    Pt would like a phone call with the results.    Action Taken: Message routed to:  Primary Care p [phone]    Travel Screening: Not Applicable

## 2020-12-04 ENCOUNTER — OFFICE VISIT (OUTPATIENT)
Dept: PEDIATRICS | Facility: CLINIC | Age: 70
End: 2020-12-04
Payer: COMMERCIAL

## 2020-12-04 VITALS
WEIGHT: 134.7 LBS | BODY MASS INDEX: 20.41 KG/M2 | HEIGHT: 68 IN | OXYGEN SATURATION: 97 % | DIASTOLIC BLOOD PRESSURE: 60 MMHG | TEMPERATURE: 97.6 F | SYSTOLIC BLOOD PRESSURE: 100 MMHG | HEART RATE: 73 BPM

## 2020-12-04 DIAGNOSIS — Z00.00 ENCOUNTER FOR MEDICARE ANNUAL WELLNESS EXAM: ICD-10-CM

## 2020-12-04 DIAGNOSIS — Z13.1 SCREENING FOR DIABETES MELLITUS (DM): ICD-10-CM

## 2020-12-04 DIAGNOSIS — Z13.6 CARDIOVASCULAR SCREENING; LDL GOAL LESS THAN 160: ICD-10-CM

## 2020-12-04 DIAGNOSIS — Z12.11 SCREEN FOR COLON CANCER: ICD-10-CM

## 2020-12-04 DIAGNOSIS — Z00.00 ROUTINE GENERAL MEDICAL EXAMINATION AT A HEALTH CARE FACILITY: Primary | ICD-10-CM

## 2020-12-04 LAB
ALBUMIN SERPL-MCNC: 3.5 G/DL (ref 3.4–5)
ALP SERPL-CCNC: 84 U/L (ref 40–150)
ALT SERPL W P-5'-P-CCNC: 22 U/L (ref 0–50)
ANION GAP SERPL CALCULATED.3IONS-SCNC: 4 MMOL/L (ref 3–14)
AST SERPL W P-5'-P-CCNC: 16 U/L (ref 0–45)
BILIRUB SERPL-MCNC: 0.5 MG/DL (ref 0.2–1.3)
BUN SERPL-MCNC: 13 MG/DL (ref 7–30)
CALCIUM SERPL-MCNC: 8.7 MG/DL (ref 8.5–10.1)
CHLORIDE SERPL-SCNC: 110 MMOL/L (ref 94–109)
CHOLEST SERPL-MCNC: 242 MG/DL
CO2 SERPL-SCNC: 27 MMOL/L (ref 20–32)
CREAT SERPL-MCNC: 0.72 MG/DL (ref 0.52–1.04)
GFR SERPL CREATININE-BSD FRML MDRD: 83 ML/MIN/{1.73_M2}
GLUCOSE SERPL-MCNC: 90 MG/DL (ref 70–99)
HDLC SERPL-MCNC: 96 MG/DL
LDLC SERPL CALC-MCNC: 130 MG/DL
NONHDLC SERPL-MCNC: 146 MG/DL
POTASSIUM SERPL-SCNC: 4 MMOL/L (ref 3.4–5.3)
PROT SERPL-MCNC: 6.8 G/DL (ref 6.8–8.8)
SODIUM SERPL-SCNC: 141 MMOL/L (ref 133–144)
TRIGL SERPL-MCNC: 79 MG/DL

## 2020-12-04 PROCEDURE — 80061 LIPID PANEL: CPT | Performed by: NURSE PRACTITIONER

## 2020-12-04 PROCEDURE — 80053 COMPREHEN METABOLIC PANEL: CPT | Performed by: NURSE PRACTITIONER

## 2020-12-04 PROCEDURE — G0439 PPPS, SUBSEQ VISIT: HCPCS | Performed by: NURSE PRACTITIONER

## 2020-12-04 PROCEDURE — 36415 COLL VENOUS BLD VENIPUNCTURE: CPT | Performed by: NURSE PRACTITIONER

## 2020-12-04 ASSESSMENT — MIFFLIN-ST. JEOR: SCORE: 1165.53

## 2020-12-04 NOTE — NURSING NOTE
"Chief Complaint   Patient presents with     Wellness Visit       Initial /60 (BP Location: Right arm, Patient Position: Sitting, Cuff Size: Adult Regular)   Pulse 73   Temp 97.6  F (36.4  C) (Temporal)   Ht 1.721 m (5' 7.75\")   Wt 61.1 kg (134 lb 11.2 oz)   SpO2 97%   Breastfeeding No   BMI 20.63 kg/m   Estimated body mass index is 20.63 kg/m  as calculated from the following:    Height as of this encounter: 1.721 m (5' 7.75\").    Weight as of this encounter: 61.1 kg (134 lb 11.2 oz).  Medication Reconciliation: complete      MEENU Jordan      "

## 2020-12-04 NOTE — PATIENT INSTRUCTIONS
PLAN:   1.   Symptomatic therapy suggested: Increase calcium to 1000mg and 800iu Vit D  2.  Orders Placed This Encounter   Medications     progesterone (PROMETRIUM) 100 MG capsule     Sig: Take 100 mg by mouth daily     UNABLE TO FIND     Sig: MEDICATION NAME: super brain booster     Biotin w/ Vitamins C & E (HAIR/SKIN/NAILS PO)     Sig: Take 1 capsule by mouth daily     Orders Placed This Encounter   Procedures     Lipid panel reflex to direct LDL Fasting     JUST IN CASE     Comprehensive metabolic panel     GASTROENTEROLOGY ADULT REF PROCEDURE ONLY       3. Patient needs to follow up in if no improvement,or sooner if worsening of symptoms or other symptoms develop.  CONSULTATION/REFERRAL to .colonscopy   Will follow up and/or notify patient of  results via My Chart to determine further need for followup  Follow up office visit in one year for annual health maintenance exam, sooner PRN.  Results for orders placed or performed in visit on 12/04/20   Comprehensive metabolic panel     Status: Abnormal   Result Value Ref Range    Sodium 141 133 - 144 mmol/L    Potassium 4.0 3.4 - 5.3 mmol/L    Chloride 110 (H) 94 - 109 mmol/L    Carbon Dioxide 27 20 - 32 mmol/L    Anion Gap 4 3 - 14 mmol/L    Glucose 90 70 - 99 mg/dL    Urea Nitrogen 13 7 - 30 mg/dL    Creatinine 0.72 0.52 - 1.04 mg/dL    GFR Estimate 83 >60 mL/min/[1.73_m2]    GFR Estimate If Black >90 >60 mL/min/[1.73_m2]    Calcium 8.7 8.5 - 10.1 mg/dL    Bilirubin Total 0.5 0.2 - 1.3 mg/dL    Albumin 3.5 3.4 - 5.0 g/dL    Protein Total 6.8 6.8 - 8.8 g/dL    Alkaline Phosphatase 84 40 - 150 U/L    ALT 22 0 - 50 U/L    AST 16 0 - 45 U/L   Lipid panel reflex to direct LDL Fasting     Status: Abnormal   Result Value Ref Range    Cholesterol 242 (H) <200 mg/dL    Triglycerides 79 <150 mg/dL    HDL Cholesterol 96 >49 mg/dL    LDL Cholesterol Calculated 130 (H) <100 mg/dL    Non HDL Cholesterol 146 (H) <130 mg/dL       Patient Education   Personalized Prevention  Plan  You are due for the preventive services outlined below.  Your care team is available to assist you in scheduling these services.  If you have already completed any of these items, please share that information with your care team to update in your medical record.  Health Maintenance Due   Topic Date Due     Zoster (Shingles) Vaccine (2 of 3) 12/19/2012     Colorectal Cancer Screening  07/18/2018     Discuss Advance Care Planning  02/18/2019     FALL RISK ASSESSMENT  03/01/2019     Eye Exam  08/28/2019     PHQ-2  01/01/2020

## 2020-12-04 NOTE — PROGRESS NOTES
"  SUBJECTIVE:   Aleksandra Michel is a 70 year old female who presents for Preventive Visit.    Patient has been advised of split billing requirements and indicates understanding: Yes  Are you in the first 12 months of your Medicare Part B coverage?  No    Physical Health:    In general, how would you rate your overall physical health? excellent    Outside of work, how many days during the week do you exercise? 6-7 days/week    Outside of work, approximately how many minutes a day do you exercise?greater than 60 minutes walking daily    If you drink alcohol do you typically have >3 drinks per day or >7 drinks per week? No    Do you usually eat at least 4 servings of fruit and vegetables a day, include whole grains & fiber and avoid regularly eating high fat or \"junk\" foods? Yes    Do you have any problems taking medications regularly?  No    Do you have any side effects from medications? none    Needs assistance for the following daily activities: no assistance needed    Which of the following safety concerns are present in your home?  none identified     Hearing impairment: No    In the past 6 months, have you been bothered by leaking of urine? no    Mental Health:    In general, how would you rate your overall mental or emotional health? good  PHQ-2 Score:      Do you feel safe in your environment? Yes    Have you ever done Advance Care Planning? (For example, a Health Directive, POLST, or a discussion with a medical provider or your loved ones about your wishes): Yes, advance care planning is on file.    Additional concerns to address?  YES, would like ears checked    Fall risk:  Fallen 2 or more times in the past year?: No  Any fall with injury in the past year?: No    Cognitive Screenin) Repeat 3 items (Leader, Season, Table)    2) Clock draw: NORMAL  3) 3 item recall: Recalls 3 objects  Results: 3 items recalled: COGNITIVE IMPAIRMENT LESS LIKELY    Mini-CogTM Copyright S Malcolm. Licensed by the author for " use in [facility]; reprinted with permission (soford@.[facility]). All rights reserved.      Do you have sleep apnea, excessive snoring or daytime drowsiness?: no      Reviewed and updated as needed this visit by clinical staff  Tobacco  Allergies  Meds   Med Hx  Surg Hx  Fam Hx  Soc Hx        Reviewed and updated as needed this visit by Provider                Social History     Tobacco Use     Smoking status: Former Smoker     Years: 4.00     Types: Cigarettes     Smokeless tobacco: Never Used   Substance Use Topics     Alcohol use: Yes     Comment: occasional glass of wine                           Current providers sharing in care for this patient include:   Patient Care Team:  Stephany Hannon APRN CNP as PCP - General (Family Practice)  Pricila Liang MD as MD (Urology)  Stephany Hannon APRN CNP as Referring Physician (Family Practice)  Stephany Hannon APRN CNP as Assigned PCP    The following health maintenance items are reviewed in Epic and correct as of today:  Health Maintenance   Topic Date Due     ZOSTER IMMUNIZATION (2 of 3) 12/19/2012     COLORECTAL CANCER SCREENING  07/18/2018     ADVANCE CARE PLANNING  02/18/2019     FALL RISK ASSESSMENT  03/01/2019     EYE EXAM  08/28/2019     MEDICARE ANNUAL WELLNESS VISIT  12/04/2021     MAMMO SCREENING  11/16/2022     DTAP/TDAP/TD IMMUNIZATION (2 - Td) 03/27/2023     LIPID  12/04/2025     DEXA  03/12/2033     HEPATITIS C SCREENING  Completed     PHQ-2  Completed     INFLUENZA VACCINE  Completed     Pneumococcal Vaccine: 65+ Years  Completed     Pneumococcal Vaccine: Pediatrics (0 to 5 Years) and At-Risk Patients (6 to 64 Years)  Aged Out     IPV IMMUNIZATION  Aged Out     MENINGITIS IMMUNIZATION  Aged Out     HEPATITIS B IMMUNIZATION  Aged Out     Lab work is in process  Labs reviewed in EPIC  BP Readings from Last 3 Encounters:   12/04/20 100/60   06/07/19 115/75   01/22/19 100/66    Wt Readings from Last 3 Encounters:    12/04/20 61.1 kg (134 lb 11.2 oz)   01/22/19 66 kg (145 lb 9.6 oz)   08/09/18 62.1 kg (137 lb)               Patient Active Problem List   Diagnosis     Colon polyp     Seasonal allergic rhinitis     Osteoporosis without current pathological fracture     CARDIOVASCULAR SCREENING; LDL GOAL LESS THAN 160     Advanced directives, counseling/discussion     Varicose veins of both lower extremities     Vaginal vault prolapse     Pelvic floor dysfunction     Past Surgical History:   Procedure Laterality Date     CYSTOSCOPY, BIOPSY BLADDER, COMBINED N/A 8/6/2018    Procedure: COMBINED CYSTOSCOPY, BIOPSY BLADDER;  Cystoscopy with Bladder Biopsy  ;  Surgeon: Pricila Liang MD;  Location: [facility] OR     ENHANCE LASER REFRACTIVE BILATERAL EXISTING PT IN PARAMETERS Right ~2008    Distance eye     HYSTERECTOMY, VAGINAL  1985    age 36, prolonged menses. ovaries remain       Social History     Tobacco Use     Smoking status: Former Smoker     Years: 4.00     Types: Cigarettes     Smokeless tobacco: Never Used   Substance Use Topics     Alcohol use: Yes     Comment: occasional glass of wine     Family History   Problem Relation Age of Onset     Eye Disorder Mother         macular degeneration     Macular Degeneration Mother      Arthritis Brother          Current Outpatient Medications   Medication Sig Dispense Refill     Biotin w/ Vitamins C & E (HAIR/SKIN/NAILS PO) Take 1 capsule by mouth daily       COMPOUNDED NON-CONTROLLED SUBSTANCE (CMPD RX) - PHARMACY TO MIX COMPOUNDED MEDICATION estrodiol 1mg /gram-use dime size amount to the vagina 2-3 x a week at night 50 g 3     Multiple Vitamin (DAILY MULTIVITAMIN PO) Take 1 tablet by mouth daily.       Multiple Vitamins-Minerals (PRESERVISION/LUTEIN PO) Take 1 tablet by mouth daily.       progesterone (PROMETRIUM) 100 MG capsule Take 100 mg by mouth daily       UNABLE TO FIND MEDICATION NAME: super brain booster       vitamin B complex with vitamin C (VITAMIN  B COMPLEX) TABS  "tablet Take 1 tablet by mouth daily       citalopram (CELEXA) 10 MG tablet TAKE 1 TABLET (10 MG) BY MOUTH DAILY 90 tablet 2     Allergies   Allergen Reactions     Dust Mites      Mold      Seasonal Allergies      Pneumonia Vaccine:Up to date    Mammogram Screening: Mammogram Screening: Patient over age 50, mutual decision to screen reflected in health maintenance.    ROS:  CONSTITUTIONAL:NEGATIVE for fever, chills, change in weight  INTEGUMENTARY/SKIN: NEGATIVE for worrisome rashes, moles or lesions  EYES: NEGATIVE for vision changes or irritation  ENT: NEGATIVE for ear, mouth and throat problems  RESP:NEGATIVE for significant cough or SOB  BREAST: NEGATIVE for masses, tenderness or discharge  CV: NEGATIVE for chest pain, palpitations or peripheral edema  GI: NEGATIVE for nausea, abdominal pain, heartburn, or change in bowel habits   menopausal female: amenorrhea, no unusual urinary symptoms and no unusual vaginal symptoms  MUSCULOSKELETAL:NEGATIVE for significant arthralgias or myalgia  NEURO: NEGATIVE for weakness, dizziness or paresthesias  ENDOCRINE: NEGATIVE for temperature intolerance, skin/hair changes  HEME/ALLERGY/IMMUNE: NEGATIVE for bleeding problems  PSYCHIATRIC: NEGATIVE for changes in mood or affect     OBJECTIVE:   /60 (BP Location: Right arm, Patient Position: Sitting, Cuff Size: Adult Regular)   Pulse 73   Temp 97.6  F (36.4  C) (Temporal)   Ht 1.721 m (5' 7.75\")   Wt 61.1 kg (134 lb 11.2 oz)   SpO2 97%   Breastfeeding No   BMI 20.63 kg/m   Estimated body mass index is 25.79 kg/m  as calculated from the following:    Height as of 1/22/19: 1.6 m (5' 3\").    Weight as of 1/22/19: 66 kg (145 lb 9.6 oz).   Wt Readings from Last 4 Encounters:   12/04/20 61.1 kg (134 lb 11.2 oz)   01/22/19 66 kg (145 lb 9.6 oz)   08/09/18 62.1 kg (137 lb)   08/06/18 59 kg (130 lb)       EXAM:   GENERAL APPEARANCE: healthy, alert and no distress  EYES: Eyes grossly normal to inspection and conjunctivae and " sclerae normal  HENT: ear canals and TM's normal, nose and mouth without ulcers or lesions, oropharynx clear and oral mucous membranes moist  NECK: no adenopathy, no asymmetry, masses, or scars and thyroid normal to palpation  RESP: lungs clear to auscultation - no rales, rhonchi or wheezes  BREAST: normal without masses, tenderness or nipple discharge and no palpable axillary masses or adenopathy  CV: regular rates and rhythm, no murmur, click or rub, no peripheral edema and peripheral pulses strong  ABDOMEN: soft, nontender, no hepatosplenomegaly, no masses and bowel sounds normal   (female): deferred  MS: no musculoskeletal defects are noted and gait is age appropriate without ataxia  SKIN: no suspicious lesions or rashes  NEURO: Normal strength and tone, sensory exam grossly normal, mentation intact and speech normal  PSYCH: mentation appears normal and affect normal/bright    Diagnostic Test Results:  Labs reviewed in Epic  Results for orders placed or performed in visit on 12/04/20   Comprehensive metabolic panel     Status: Abnormal   Result Value Ref Range    Sodium 141 133 - 144 mmol/L    Potassium 4.0 3.4 - 5.3 mmol/L    Chloride 110 (H) 94 - 109 mmol/L    Carbon Dioxide 27 20 - 32 mmol/L    Anion Gap 4 3 - 14 mmol/L    Glucose 90 70 - 99 mg/dL    Urea Nitrogen 13 7 - 30 mg/dL    Creatinine 0.72 0.52 - 1.04 mg/dL    GFR Estimate 83 >60 mL/min/[1.73_m2]    GFR Estimate If Black >90 >60 mL/min/[1.73_m2]    Calcium 8.7 8.5 - 10.1 mg/dL    Bilirubin Total 0.5 0.2 - 1.3 mg/dL    Albumin 3.5 3.4 - 5.0 g/dL    Protein Total 6.8 6.8 - 8.8 g/dL    Alkaline Phosphatase 84 40 - 150 U/L    ALT 22 0 - 50 U/L    AST 16 0 - 45 U/L   Lipid panel reflex to direct LDL Fasting     Status: Abnormal   Result Value Ref Range    Cholesterol 242 (H) <200 mg/dL    Triglycerides 79 <150 mg/dL    HDL Cholesterol 96 >49 mg/dL    LDL Cholesterol Calculated 130 (H) <100 mg/dL    Non HDL Cholesterol 146 (H) <130 mg/dL       ASSESSMENT  "/ PLAN:   Aleksandra was seen today for wellness visit.    Diagnoses and all orders for this visit:    Routine general medical examination at a health care facility    Encounter for Medicare annual wellness exam    CARDIOVASCULAR SCREENING; LDL GOAL LESS THAN 160  -     Lipid panel reflex to direct LDL Fasting; Future    Screening for diabetes mellitus (DM)  -     JUST IN CASE; Future  -     Comprehensive metabolic panel; Future    Screen for colon cancer  -     GASTROENTEROLOGY ADULT REF PROCEDURE ONLY; Future    PLAN:    Patient needs to follow up in if no improvement,or sooner if worsening of symptoms or other symptoms develop.  CONSULTATION/REFERRAL to .colonscopy   Will follow up and/or notify patient of  results via My Chart to determine further need for followup  Follow up office visit in one year for annual health maintenance exam, sooner PRN.    Patient has been advised of split billing requirements and indicates understanding: Yes    COUNSELING:  Reviewed preventive health counseling, as reflected in patient instructions  Special attention given to:       Regular exercise       Healthy diet/nutrition       Vision screening       Osteoporosis Prevention/Bone Health       Colon cancer screening       The 10-year ASCVD risk score (Remedios BROWNE Jr., et al., 2013) is: 7.3%    Values used to calculate the score:      Age: 70 years      Sex: Female      Is Non- : No      Diabetic: No      Tobacco smoker: No      Systolic Blood Pressure: 100 mmHg      Is BP treated: No      HDL Cholesterol: 96 mg/dL      Total Cholesterol: 242 mg/dL       Advanced Planning     Estimated body mass index is 25.79 kg/m  as calculated from the following:    Height as of 1/22/19: 1.6 m (5' 3\").    Weight as of 1/22/19: 66 kg (145 lb 9.6 oz).        She reports that she has quit smoking. Her smoking use included cigarettes. She quit after 4.00 years of use. She has never used smokeless tobacco.    Appropriate preventive " services were discussed with this patient, including applicable screening as appropriate for cardiovascular disease, diabetes, osteopenia/osteoporosis, and glaucoma.  As appropriate for age/gender, discussed screening for colorectal cancer, prostate cancer, breast cancer, and cervical cancer. Checklist reviewing preventive services available has been given to the patient.    Reviewed patients plan of care and provided an AVS. The Basic Care Plan (routine screening as documented in Health Maintenance) for Aleksandra meets the Care Plan requirement. This Care Plan has been established and reviewed with the Patient.    Counseling Resources:  ATP IV Guidelines  Pooled Cohorts Equation Calculator  Breast Cancer Risk Calculator  BRCA-Related Cancer Risk Assessment: FHS-7 Tool  FRAX Risk Assessment  ICSI Preventive Guidelines  Dietary Guidelines for Americans, 2010  USDA's MyPlate  ASA Prophylaxis  Lung CA Screening    VARSHA Perkins [facility]

## 2021-01-19 ENCOUNTER — OFFICE VISIT (OUTPATIENT)
Dept: FAMILY MEDICINE | Facility: CLINIC | Age: 71
End: 2021-01-19
Payer: COMMERCIAL

## 2021-01-19 VITALS
BODY MASS INDEX: 20.98 KG/M2 | TEMPERATURE: 97.8 F | RESPIRATION RATE: 14 BRPM | DIASTOLIC BLOOD PRESSURE: 65 MMHG | WEIGHT: 137 LBS | OXYGEN SATURATION: 98 % | SYSTOLIC BLOOD PRESSURE: 100 MMHG | HEART RATE: 97 BPM

## 2021-01-19 DIAGNOSIS — J30.2 SEASONAL ALLERGIC RHINITIS, UNSPECIFIED TRIGGER: Primary | ICD-10-CM

## 2021-01-19 PROCEDURE — 99213 OFFICE O/P EST LOW 20 MIN: CPT | Performed by: NURSE PRACTITIONER

## 2021-01-19 RX ORDER — CETIRIZINE HYDROCHLORIDE 10 MG/1
10 TABLET ORAL DAILY
COMMUNITY
End: 2021-08-18

## 2021-01-19 RX ORDER — OXYMETAZOLINE HYDROCHLORIDE 0.05 G/100ML
2 SPRAY NASAL 2 TIMES DAILY
COMMUNITY

## 2021-01-19 NOTE — LETTER
January 19, 2021      Aleksandra Michel  [id number]  [place] [address]        To Whom It May Concern,     Aleksandra Michel has a long standing  hx of allergies and exposure to smoke is a significant trigger for her allergies.  It would be best for her health to be able to move to a place that she is not exposed to this type of irritant to her upper respiratory passages.       Sincerely,        VARSHA Perkins CNP

## 2021-01-19 NOTE — PATIENT INSTRUCTIONS
PLAN:   1.   Symptomatic therapy suggested: Continue current medications as prescribed.   2.  Orders Placed This Encounter   Medications     oxymetazoline (AFRIN) 0.05 % nasal spray     Sig: Spray 2 sprays into both nostrils 2 times daily     cetirizine (ZYRTEC) 10 MG tablet     Sig: Take 10 mg by mouth daily     3. Patient needs to follow up in if no improvement,or sooner if worsening of symptoms or other symptoms develop.

## 2021-01-19 NOTE — PROGRESS NOTES
Celso Lake is a 70 year old who presents to clinic today for the following health issues     HPI     -patient would like a letter written for her landlord stating that she cannot be around cigarette smoke as it is hazardous to her health. Her new neighbors smoke and she can smell the smoke from her house. Gets headaches and a stuffy nose, eyes become red. She states that if she can get this letter she can get out of her lease without penalty    Patient has a hx of allergies for years  In her 40s she got allergy shots and even had an air purifier   She if very sensitive to smoke as this is a trigger for her allergy symptoms   She recently has gotten a new neighbor in the last few months who unfortunately smokes regularly and heavily and she has been having significant symptoms due to the smoke exposure as this neighbor lives directly under her so it go through the vents  She can smell it in numerous rooms despite having an air purifier running   Her neighbor has also stated she is having similar issues   This building is supposed to be a non smoking building     Review of Systems   Constitutional, HEENT, cardiovascular, pulmonary, gi and gu systems are negative, except as otherwise noted.      Objective    /65   Pulse 97   Temp 97.8  F (36.6  C) (Oral)   Resp 14   Wt 62.1 kg (137 lb)   SpO2 98%   BMI 20.98 kg/m    Body mass index is 20.98 kg/m .  Physical Exam   GENERAL APPEARANCE: alert, active and no distress  NECK: no adenopathy  RESP: lungs clear to auscultation - no rales, rhonchi or wheezes  CV: regular rates and rhythm  MS: extremities normal- no gross deformities noted  SKIN: no suspicious lesions or rashes  NEURO: Normal strength and tone, mentation intact and speech normal  PSYCH: mentation appears normal and affect normal/bright  MENTAL STATUS EXAM:  Appearance/Behavior: No apparent distress, Casually groomed and Dressed appropriately for weather  Speech: Normal  Mood/Affect: normal  affect  Insight: Adequate    Diagnostic Test Results:  Labs reviewed in Epic          Assessment & Plan     Seasonal allergic rhinitis, unspecified trigger  Discussed the nature and general treatment of allergies including avoidance, meds, and shot therapy. I recommended initially trying to control the symptoms with meds and going on to allergy treating and shot therapy if not well controlled.  Discussed the use of antihistamines, nasal steroid sprays   Letter written for her landlord   {  See Patient Instructions  Patient Instructions     PLAN:   1.   Symptomatic therapy suggested: Continue current medications as prescribed.   2.  Orders Placed This Encounter   Medications     oxymetazoline (AFRIN) 0.05 % nasal spray     Sig: Spray 2 sprays into both nostrils 2 times daily     cetirizine (ZYRTEC) 10 MG tablet     Sig: Take 10 mg by mouth daily     3. Patient needs to follow up in if no improvement,or sooner if worsening of symptoms or other symptoms develop.    No follow-ups on file.    VARSHA Perkins [facility]

## 2021-03-16 ENCOUNTER — IMMUNIZATION (OUTPATIENT)
Dept: NURSING | Facility: CLINIC | Age: 71
End: 2021-03-16
Payer: COMMERCIAL

## 2021-03-16 PROCEDURE — 91300 PR COVID VAC PFIZER DIL RECON 30 MCG/0.3 ML IM: CPT

## 2021-03-16 PROCEDURE — 0001A PR COVID VAC PFIZER DIL RECON 30 MCG/0.3 ML IM: CPT

## 2021-04-06 ENCOUNTER — IMMUNIZATION (OUTPATIENT)
Dept: NURSING | Facility: CLINIC | Age: 71
End: 2021-04-06
Attending: INTERNAL MEDICINE
Payer: COMMERCIAL

## 2021-04-06 PROCEDURE — 91300 PR COVID VAC PFIZER DIL RECON 30 MCG/0.3 ML IM: CPT

## 2021-04-06 PROCEDURE — 0002A PR COVID VAC PFIZER DIL RECON 30 MCG/0.3 ML IM: CPT

## 2021-06-29 ENCOUNTER — TELEPHONE (OUTPATIENT)
Dept: GASTROENTEROLOGY | Facility: CLINIC | Age: 71
End: 2021-06-29

## 2021-06-29 ENCOUNTER — HOSPITAL ENCOUNTER (OUTPATIENT)
Facility: AMBULATORY SURGERY CENTER | Age: 71
End: 2021-06-29
Attending: SURGERY | Admitting: SURGERY
Payer: COMMERCIAL

## 2021-06-29 DIAGNOSIS — Z11.59 ENCOUNTER FOR SCREENING FOR OTHER VIRAL DISEASES: ICD-10-CM

## 2021-06-29 NOTE — TELEPHONE ENCOUNTER
Screening Questions  1. Are you active on mychart? No, preference is mailing out instructions and prep    2. What insurance is in the chart? On chart    2.  Ordering/Referring Provider: Stephany Hannon APRN CNP in [facility] FP/IM/PEDS    3. BMI 22.7    4. Are you on daily home oxygen? no    5. Do you have a history of difficult airway? no    6. Have you had a heart, lung, or liver transplant? no    7. Are you currently on dialysis? no    8. Have you had a stroke or Transient ischemic atttack (TIA) within 6 months? no    9. In the past 6 months, have you had any heart related issues including cardiomyopathy or heart attack?         If yes, did it require cardiac stenting or other implantable device?no    10. Do you have any implantable devices in your body (pacemaker, defib, LVAD)? no    11. Do you take nitroglycerin? If yes, how often? no    12. Are you currently taking any blood thinners?no    13. Are you a diabetic? no    14. (Females) Are you currently pregnant? n/a  If yes, how many weeks?    15. Have you had a procedure in the past that was difficult to tolerate with conscious sedation? Any allergies to Fentanyl or Versed no    16. Are you taking any scheduled prescription narcotics more than once daily? no    17. Do you have any chemical dependencies such as alcohol, street drugs, or methadone? no    18. Do you have any history of post-traumatic stress syndrome or mental health issues? no    19. Do you transfer independently? yes    20.  Do you have any issues with constipation? no    21. Preferred Pharmacy for Pre Prescription On chart    Scheduling Details    Procedure Scheduled: Colonoscopy   Provider/Surgeon: Royce  Date of Procedure: 8/10/21  Location: [place]  Caller (Please ask for phone number if not scheduled by patient): [facility]      Sedation Type: CS  Conscious Sedation- Needs  for 6 hours after the procedure  MAC/General-Needs  for 24 hours after procedure    Pre-op Required  at [facility] [place], and OR for MAC sedation:   (if yes advise patient they will need a pre-op prior to procedure)      Is patient on blood thinners? -no (If yes- inform patient to follow up with PCP or provider for follow up instructions)     Informed patient they will need an adult  yes  Cannot take any type of public or medical transportation alone    Informed Patient of COVID Test Requirement yes    Confirmed Nurse will call to complete assessment yes    Additional comments: n/a

## 2021-07-13 ENCOUNTER — TELEPHONE (OUTPATIENT)
Dept: GASTROENTEROLOGY | Facility: CLINIC | Age: 71
End: 2021-07-13

## 2021-07-13 NOTE — TELEPHONE ENCOUNTER
Caller: Aleksandra Michel    Procedure: colonoscopy     Date of Procedure Cancelled: 8/10/2021    Ordering Provider:Stephany Hannon    Reason for cancel: pt cx procedure and will call to reschedule at another time.     Rescheduled: no     If rescheduled    Date:    Location:    Note any change or update to original order/sedation:

## 2021-08-17 NOTE — PROGRESS NOTES
[address] [address]  Phone: [phone]  Primary Provider: Stephany Hannon  Pre-op Performing Provider: JANET BUTLER      PREOPERATIVE EVALUATION:  Today's date: 8/18/2021    Aleksandra Michel is a 71 year old female who presents for a preoperative evaluation.    Surgical Information:   Surgery/Procedure: Left eye Cataract Surgery  Surgery Location: [facility] fax to this location   Surgeon: Dr Ruelas  Surgery Date: 09/01/2021  Time of Surgery: Not available  Where patient plans to recover: At home with family  Fax number for surgical facility: phone number: [phone]  [place] fax to this location       Type of Anesthesia Anticipated: to be determined    Assessment & Plan     The proposed surgical procedure is considered LOW risk.    Preop general physical exam  Normal exam. No concerns    Senile cataract of left eye, unspecified age-related cataract type  Reason for surgery, doesn't need right eye done    Seasonal allergic rhinitis, unspecified trigger  stable           Risks and Recommendations:  The patient has the following additional risks and recommendations for perioperative complications:   - No identified additional risk factors other than previously addressed    Medication Instructions:  Patient is to take all scheduled medications on the day of surgery    RECOMMENDATION:  APPROVAL GIVEN to proceed with proposed procedure, without further diagnostic evaluation.      Subjective     HPI related to upcoming procedure:  Just left eye cataract, decreased vision      Preop Questions 8/18/2021   1. Have you ever had a heart attack or stroke? No   2. Have you ever had surgery on your heart or blood vessels, such as a stent placement, a coronary artery bypass, or surgery on an artery in your head, neck, heart, or legs? No   3. Do you have chest pain with activity? No   4. Do you have a history of  heart failure? No    5. Do you currently have a cold, bronchitis or symptoms of other infection? No   6. Do you have a cough, shortness of breath, or wheezing? No   7. Do you or anyone in your family have previous history of blood clots? No   8. Do you or does anyone in your family have a serious bleeding problem such as prolonged bleeding following surgeries or cuts? No   9. Have you ever had problems with anemia or been told to take iron pills? No   10. Have you had any abnormal blood loss such as black, tarry or bloody stools, or abnormal vaginal bleeding? No   11. Have you ever had a blood transfusion? No   12. Are you willing to have a blood transfusion if it is medically needed before, during, or after your surgery? NO    13. Have you or any of your relatives ever had problems with anesthesia? No   14. Do you have sleep apnea, excessive snoring or daytime drowsiness? No   15. Do you have any artifical heart valves or other implanted medical devices like a pacemaker, defibrillator, or continuous glucose monitor? No   16. Do you have artificial joints? No   17. Are you allergic to latex? No     Health Care Directive:  Patient has a Health Care Directive on file      Preoperative Review of :   reviewed - no record of controlled substances prescribed.      Status of Chronic Conditions:  See problem list for active medical problems.  Problems all longstanding and stable, except as noted/documented.  See ROS for pertinent symptoms related to these conditions.      Review of Systems  CONSTITUTIONAL: NEGATIVE for fever, chills, change in weight  INTEGUMENTARY/SKIN: NEGATIVE for worrisome rashes, moles or lesions  EYES: NEGATIVE for vision changes or irritation  ENT/MOUTH: NEGATIVE for ear, mouth and throat problems  RESP: NEGATIVE for significant cough or SOB  CV: NEGATIVE for chest pain, palpitations or peripheral edema  GI: NEGATIVE for nausea, abdominal pain, heartburn, or change in bowel habits  : NEGATIVE for frequency,  dysuria, or hematuria  MUSCULOSKELETAL: NEGATIVE for significant arthralgias or myalgia  NEURO: NEGATIVE for weakness, dizziness or paresthesias  ENDOCRINE: NEGATIVE for temperature intolerance, skin/hair changes  HEME: NEGATIVE for bleeding problems  PSYCHIATRIC: NEGATIVE for changes in mood or affect    Patient Active Problem List    Diagnosis Date Noted     Vaginal vault prolapse 01/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  Aleksandra Michel has no plan or code status on file. Discussed available resources and provided with information. Added by Ronald Irwin on 3/27/2013             CARDIOVASCULAR SCREENING; LDL GOAL LESS THAN 160 06/22/2012     Priority: Medium     Colon polyp 06/21/2012     Priority: Medium     2010??, due Q 5 years.        Seasonal allergic rhinitis 06/21/2012     Priority: Medium     Osteoporosis without current pathological fracture 06/21/2012     Priority: Medium     On Fosomax   Problem list name updated by automated process. Provider to review        Past Medical History:   Diagnosis Date     Allergic rhinitis, cause unspecified     allergy shots in the past.      Major depressive disorder, single episode     noted per past records with [place] physicians     Nosebleed     er visit for tamponade while on nasonex.      Past Surgical History:   Procedure Laterality Date     CYSTOSCOPY, BIOPSY BLADDER, COMBINED N/A 8/6/2018    Procedure: COMBINED CYSTOSCOPY, BIOPSY BLADDER;  Cystoscopy with Bladder Biopsy  ;  Surgeon: Pricila Liang MD;  Location: [facility] OR     ENHANCE LASER REFRACTIVE BILATERAL EXISTING PT IN PARAMETERS Right ~2008    Distance eye     HYSTERECTOMY, VAGINAL  1985    age  "36, prolonged menses. ovaries remain     Current Outpatient Medications   Medication Sig Dispense Refill     Biotin w/ Vitamins C & E (HAIR/SKIN/NAILS PO) Take 1 capsule by mouth daily       Multiple Vitamin (DAILY MULTIVITAMIN PO) Take 1 tablet by mouth daily.       oxymetazoline (AFRIN) 0.05 % nasal spray Spray 2 sprays into both nostrils 2 times daily       progesterone (PROMETRIUM) 100 MG capsule Take 100 mg by mouth daily       UNABLE TO FIND MEDICATION NAME: super brain booster       vitamin B complex with vitamin C (VITAMIN  B COMPLEX) TABS tablet Take 1 tablet by mouth daily       COMPOUNDED NON-CONTROLLED SUBSTANCE (CMPD RX) - PHARMACY TO MIX COMPOUNDED MEDICATION estrodiol 1mg /gram-use dime size amount to the vagina 2-3 x a week at night (Patient not taking: Reported on 8/18/2021) 50 g 3       Allergies   Allergen Reactions     Dust Mites      Mold      Seasonal Allergies         Social History     Tobacco Use     Smoking status: Former Smoker     Years: 4.00     Types: Cigarettes     Smokeless tobacco: Never Used   Substance Use Topics     Alcohol use: Yes     Comment: occasional glass of wine     Family History   Problem Relation Age of Onset     Eye Disorder Mother         macular degeneration     Macular Degeneration Mother      Arthritis Brother      History   Drug Use No         Objective     BP 96/60 (BP Location: Right arm, Patient Position: Sitting, Cuff Size: Adult Regular)   Pulse 73   Temp 98.8  F (37.1  C) (Temporal)   Resp 16   Ht 1.588 m (5' 2.5\")   Wt 61.7 kg (136 lb)   SpO2 96%   Breastfeeding No   BMI 24.48 kg/m      Physical Exam    GENERAL APPEARANCE: healthy, alert and no distress     EYES: EOMI, PERRL     HENT: ear canals and TM's normal and nose and mouth without ulcers or lesions     NECK: no adenopathy, no asymmetry, masses, or scars and thyroid normal to palpation     RESP: lungs clear to auscultation - no rales, rhonchi or wheezes     CV: regular rates and rhythm, normal " S1 S2, no S3 or S4 and no murmur, click or rub     ABDOMEN:  soft, nontender, no HSM or masses and bowel sounds normal     MS: extremities normal- no gross deformities noted, no evidence of inflammation in joints, FROM in all extremities.     SKIN: no suspicious lesions or rashes     NEURO: Normal strength and tone, sensory exam grossly normal, mentation intact and speech normal     PSYCH: mentation appears normal. and affect normal/bright     LYMPHATICS: No cervical adenopathy    Recent Labs   Lab Test 12/04/20  0955      POTASSIUM 4.0   CR 0.72        Diagnostics:  No labs were ordered during this visit.   No EKG required for low risk surgery (cataract, skin procedure, breast biopsy, etc).    Revised Cardiac Risk Index (RCRI):  The patient has the following serious cardiovascular risks for perioperative complications:   - No serious cardiac risks = 0 points     RCRI Interpretation: 0 points: Class I (very low risk - 0.4% complication rate)           Signed Electronically by:   VARSHA Apple, NP-C  [facility]  Copy of this evaluation report is provided to requesting physician.

## 2021-08-17 NOTE — PATIENT INSTRUCTIONS

## 2021-08-18 ENCOUNTER — OFFICE VISIT (OUTPATIENT)
Dept: FAMILY MEDICINE | Facility: CLINIC | Age: 71
End: 2021-08-18
Payer: COMMERCIAL

## 2021-08-18 VITALS
TEMPERATURE: 98.8 F | RESPIRATION RATE: 16 BRPM | HEART RATE: 73 BPM | SYSTOLIC BLOOD PRESSURE: 96 MMHG | WEIGHT: 136 LBS | OXYGEN SATURATION: 96 % | HEIGHT: 63 IN | BODY MASS INDEX: 24.1 KG/M2 | DIASTOLIC BLOOD PRESSURE: 60 MMHG

## 2021-08-18 DIAGNOSIS — J30.2 SEASONAL ALLERGIC RHINITIS, UNSPECIFIED TRIGGER: ICD-10-CM

## 2021-08-18 DIAGNOSIS — H25.9 SENILE CATARACT OF LEFT EYE, UNSPECIFIED AGE-RELATED CATARACT TYPE: ICD-10-CM

## 2021-08-18 DIAGNOSIS — Z01.818 PREOP GENERAL PHYSICAL EXAM: Primary | ICD-10-CM

## 2021-08-18 PROCEDURE — 99214 OFFICE O/P EST MOD 30 MIN: CPT | Performed by: NURSE PRACTITIONER

## 2021-08-18 ASSESSMENT — MIFFLIN-ST. JEOR: SCORE: 1083.08

## 2021-08-18 NOTE — Clinical Note
Please call [phone] to determine fax number, then fax pre-op to: [facility] location for Dr. Ruelas   Thank you,  VARSHA Apple, NP-C  [facility]

## 2021-08-19 ENCOUNTER — TELEPHONE (OUTPATIENT)
Dept: FAMILY MEDICINE | Facility: CLINIC | Age: 71
End: 2021-08-19

## 2021-12-24 ENCOUNTER — IMMUNIZATION (OUTPATIENT)
Dept: NURSING | Facility: CLINIC | Age: 71
End: 2021-12-24
Payer: COMMERCIAL

## 2021-12-24 PROCEDURE — 0004A PR COVID VAC PFIZER DIL RECON 30 MCG/0.3 ML IM: CPT

## 2021-12-24 PROCEDURE — 91300 PR COVID VAC PFIZER DIL RECON 30 MCG/0.3 ML IM: CPT

## 2022-07-19 ENCOUNTER — TRANSFERRED RECORDS (OUTPATIENT)
Dept: MULTI SPECIALTY CLINIC | Facility: CLINIC | Age: 72
End: 2022-07-19

## 2022-07-19 ENCOUNTER — OFFICE VISIT (OUTPATIENT)
Dept: VASCULAR SURGERY | Facility: CLINIC | Age: 72
End: 2022-07-19
Payer: COMMERCIAL

## 2022-07-19 DIAGNOSIS — I83.813 VARICOSE VEINS OF LEG WITH PAIN, BILATERAL: Primary | ICD-10-CM

## 2022-07-19 DIAGNOSIS — I83.892 VARICOSE VEINS OF LEG WITH EDEMA, LEFT: ICD-10-CM

## 2022-07-19 PROCEDURE — 99203 OFFICE O/P NEW LOW 30 MIN: CPT | Performed by: SPECIALIST

## 2022-07-19 NOTE — PROGRESS NOTES
After Visit Follow Up  Per pt request, faxed their compression hose Rx to [facility] at [phone].   Pt would like 1 pair(s) of beige, open-toe, knee high, 20-30mmhg compression hose. Fax confirmed.    Pt aware they will be shipped to their home address.    Christi Lizarraga MA

## 2022-07-19 NOTE — LETTER
7/19/2022         RE: Aleksandra Michel  [address]  [place] [address]        Dear Colleague,    Thank you for referring your patient, Aleksandra Michel, to the [facility] [place]. Please see a copy of my visit note below.    Consult requested by Stephany Hannon    Reason consultation: Bilateral varicose veins with pain and swelling    HPI:  Patient is a 72-year-old lady with a longstanding history of bilateral lower extremity varicose veins.  For the past year or so she did notice tiredness achiness and swelling worse at the end of the day.  She works as a / and stands for many hours.  She has a history of sclerotherapy about 10 years ago for mainly cosmetic reasons.  She has a history of sclerotherapy done about 10 years ago for cosmetic reasons.  Her legs are equal in symptomatology.  She denies any leg trauma, DVT, superficial phlebitis or nonhealing wounds.  She has not worn compression recently nor had a recent ultrasound.  She now presents to me for evaluation of her bilateral lower extremity varicose veins and pain/achiness.    Past Medical History:   Diagnosis Date     Allergic rhinitis, cause unspecified     allergy shots in the past.      Major depressive disorder, single episode     noted per past records with [place] physicians     Nosebleed     er visit for tamponade while on nasonex.      Past Surgical History:   Procedure Laterality Date     CYSTOSCOPY, BIOPSY BLADDER, COMBINED N/A 8/6/2018    Procedure: COMBINED CYSTOSCOPY, BIOPSY BLADDER;  Cystoscopy with Bladder Biopsy  ;  Surgeon: Pricila Liang MD;  [facility]     ENHANCE LASER REFRACTIVE BILATERAL EXISTING PT IN PARAMETERS Right ~2008    Distance eye     HYSTERECTOMY, VAGINAL  1985    age 36, prolonged menses. ovaries remain     Current Outpatient Medications   Medication     Biotin w/ Vitamins C & E (HAIR/SKIN/NAILS PO)     COMPOUNDED NON-CONTROLLED SUBSTANCE (CMPD RX) - PHARMACY TO  MIX COMPOUNDED MEDICATION     Multiple Vitamin (DAILY MULTIVITAMIN PO)     oxymetazoline (AFRIN) 0.05 % nasal spray     progesterone (PROMETRIUM) 100 MG capsule     UNABLE TO FIND     vitamin B complex with vitamin C (VITAMIN  B COMPLEX) TABS tablet     No current facility-administered medications for this visit.        Allergies   Allergen Reactions     Dust Mites      Mold      Seasonal Allergies      Social History     Socioeconomic History     Marital status:      Spouse name: Not on file     Number of children: Not on file     Years of education: Not on file     Highest education level: Not on file   Occupational History     Not on file   Tobacco Use     Smoking status: Former Smoker     Years: 4.00     Types: Cigarettes     Smokeless tobacco: Never Used   Substance and Sexual Activity     Alcohol use: Yes     Comment: occasional glass of wine     Drug use: No     Sexual activity: Yes     Partners: Male   Other Topics Concern     Parent/sibling w/ CABG, MI or angioplasty before 65F 55M? No      Service No     Blood Transfusions No     Caffeine Concern Yes     Comment: 1-2 cups per day     Occupational Exposure No     Hobby Hazards No     Sleep Concern Yes     Comment: Taking melatonin to aid sleep     Stress Concern No     Weight Concern No     Special Diet No     Back Care Yes     Comment: prn     Exercise Yes     Comment: Pilates, stretching and walking daily     Bike Helmet Yes     Seat Belt Yes     Self-Exams No     Comment: occasional, but forgets to do on her own   Social History Narrative     Not on file     Social Determinants of Health     Financial Resource Strain: Not on file   Food Insecurity: Not on file   Transportation Needs: Not on file   Physical Activity: Not on file   Stress: Not on file   Social Connections: Not on file   Intimate Partner Violence: Not on file   Housing Stability: Not on file     Family History   Problem Relation Age of Onset     Eye Disorder Mother          macular degeneration     Macular Degeneration Mother      Arthritis Brother       ROS: 10 point ROS neg other than the symptoms noted above in the HPI.    PE:  B/P: Data Unavailable, T: Data Unavailable, P: Data Unavailable, R: Data Unavailable  General: well developed, well nourished WF who appears their stated age  HEENT: NC/AT, EOMI, (-)icterus, (-)injection  Neck: Supple, No JVD  Chest: CTA  Heart: S1, S2, (-)m/r/g  Abd: Soft, non tender, non distended, non tender, no masses  Ext; Warm, right lower extremity scattered varicose and spider veins.  Left lower extremity +1 calf edema with varicose and spider veins, greater than right side.  Psych: AAOx3  Neuro: No focal deficits        Impression/plan:  This is a 72-year-old lady with bilateral symptomatic varicose veins.  She is a CEAP 2 on the right and CEAP 3 on the left.  I discussed the etiology of varicose veins as well as the role of ultrasound and compression therapy.  She expressed understanding.  After discussion the patient the plan at this time to start her knee-high compression socks and obtain an ultrasound for reflux.  Any further treatment we determined by the ultrasound findings and her response to compression therapy.  She will follow-up with me after ultrasound.    Krishna Rodriguez MD, FACS      Again, thank you for allowing me to participate in the care of your patient.        Sincerely,        Krishna Rodriguez MD

## 2022-07-19 NOTE — PATIENT INSTRUCTIONS
Varicose Veins and Spider Veins    Varicose veins are swollen, enlarged veins most often found in the legs. They are usually blue or purple in color and may bulge, twist, and stand out under the skin. Spider veins are small veins just under your skin that can look red, blue or purple.        Normally, veins return blood from the body to the heart. The leg veins have one-way valves that prevent blood from flowing backward in the vein. When the valves are weak or damaged, blood backs up in the veins. This may cause some of the veins to swell and bulge and become varicose veins.     Symptoms  Varicose veins may or may not cause symptoms. If symptoms do occur, they can include:  Legs that feel tired, achy, heavy, or itchy  Leg swelling  Leg muscle cramps  Skin changes, such as discoloration, dryness, redness, or rash (in more severe cases, you may also have sores on the skin called venous leg ulcers)    Risk factors  There are a number of factors that increase the risk for varicose veins. These can include:   Being a woman  Being older  Sitting or standing for long periods  Being overweight  Being pregnant  Having a family history of varicose veins  Hormones, birth control pills    Treatment starts with simple self-help measures (see below). If these don't help, there are many procedures that can be done to shrink or remove varicose veins. Your healthcare provider can tell you more about these options, if needed.     Home care  Support or compression stockings will likely be prescribed. If so, be sure to wear them as directed. They may help improve blood flow.  Exercising helps strengthen your leg muscles and improve blood flow. To get the most benefit, choose exercises such as walking, swimming, or cycling. Also try to exercise for at least 30 minutes on most days.  Raising your legs above heart level will help relieve swelling and keep blood from pooling in veins. Try to elevate your legs for 15 to 20 minutes at  the end of the day, and whenever you're relaxing. To make sure your legs are raised above heart level, prop them up on cushions or large pillows.  To keep blood moving when you have to sit or stand for long periods, try these tips:  At work, take walking breaks instead of coffee breaks. Walk during your lunch hour. Or try flexing your feet up and down 10 times each hour.  When standing, raise yourself up and down on your toes, or rock back and forth on your heels.  If you are overweight, talk with your healthcare provider about setting up a weight-loss plan. Maintaining a healthy weight can help reduce the strain on your veins. It may also improve symptoms, such as swelling and aching.  If you have dryness and itching, ask your provider about special lotions that can be applied to the skin to help improve symptoms.     Follow-up care  Follow up with your healthcare provider, or as directed. If imaging tests were done, you'll be told the results and if there are any new findings that affect your care.     When to seek medical advice  Call your healthcare provider right away if any of these occur:  Sudden, severe leg swelling, pain, or redness  Symptoms worsen, or they don't improve with self-care  Bleeding from any affected veins  Ulcers form on the legs, ankles, or feet  Fever of 100.4 F (38 C) or higher, or as advised by your provider    [employer] last reviewed this educational content on 4/1/2018 2000-2021 [employer]. All rights reserved. This information is not intended as a substitute for professional medical care. Always follow your healthcare professional's instructions.    Self-Care for Spider and Varicose Veins    Your healthcare provider may suggest that you try self-care. Exercising and maintaining a healthy weight may keep problem veins from getting worse. Wearing elastic stockings and elevating your legs can help improve blood flow. Taking breaks when you sit or stand helps,  too.        Wearing compression stockings  Compression stockings gently squeeze veins so blood flows upward. If you need compression stockings, your healthcare provider can prescribe them for you. Follow your healthcare provider's advice about how and when to wear them. Compression stockings come in several different levels of pressure. Ask your healthcare provider which level of pressure would help you the most.     Raising your legs above heart level will help relieve swelling and keep blood from pooling in veins. Try to elevate your legs for 15 to 20 minutes at the end of the day, and whenever you're relaxing. To make sure your legs are raised above heart level, prop them up on cushions or large pillows.    To keep blood moving when you have to sit or stand for long periods, try these tips:  At work, take walking breaks instead of coffee breaks. Walk during your lunch hour. Or try flexing your feet up and down 10 times each hour.  When standing, raise yourself up and down on your toes, or rock back and forth on your heels.    [employer] last reviewed this educational content on 11/1/2019 2000-2021 [employer]. All rights reserved. This information is not intended as a substitute for professional medical care. Always follow your healthcare professional's instructions.    Treatment Options    Sclerotherapy  Your healthcare provider will inject the vein with a special chemical that will quickly close the vein from the inside. This is particularly useful for spider veins and smaller varicose veins.      If you have large varicose veins, surgery may be the best choice. But it will not prevent new varicose veins from forming. Surgery is most often done in a surgery center or in the office.    If surgery is recommended for you, your surgery will be tailored to your needs. Varicose veins may be tied off (ligation), destroyed, or removed. Blood will then flow through the healthy veins. One or more of the  following techniques may be used:    Ablation (laser or radiofrequency)  A tiny cut in the skin is made near the varicose vein. A small tube called a catheter is inserted into the vein. Energy or heat released from the catheter tip will make the vein walls collapse and stick together, stopping all blood flow through the vein.         Ablation (glue)  A tiny cut in the skin is made near the varicose vein. A small tube called a catheter is inserted into the vein. Droplets of glue are deposited into the vein to make vein walls collapse and stick together stopping blood flow through the vein.    Microphlebectomy or ambulatory phlebectomy  A special hook is used to gently take out a varicose vein through tiny incisions. Microphlebectomy may be done in your healthcare provider's office.      Vein stripping and ligation (rare)  In more severe cases, the surgeon may tie off and remove veins by making smaller cuts in the skin. Smaller branching veins may also be tied off or removed.    Know about the risks  Your healthcare provider will talk with you about the risks of surgery. These include:  Bleeding or swelling  A sense of numbness, burning, or tingling in areas near the procedure  Edema or swelling in the legs  Clots in the deep veins that may travel to the lungs  Infection  Scarring  Inflammation related to the glue    [employer] last reviewed this educational content on 11/1/2019 (Sclerotherapy image) 12/1/2019 (Radiofrequency ablation image, Microphlebectomy image)    9444-1357 [employer]. All rights reserved. This information is not intended as a substitute for professional medical care. Always follow your healthcare professional's instructions.

## 2022-07-19 NOTE — PROGRESS NOTES
Consult requested by Stephany Hannon    Reason consultation: Bilateral varicose veins with pain and swelling    HPI:  Patient is a 72-year-old lady with a longstanding history of bilateral lower extremity varicose veins.  For the past year or so she did notice tiredness achiness and swelling worse at the end of the day.  She works as a / and stands for many hours.  She has a history of sclerotherapy about 10 years ago for mainly cosmetic reasons.  She has a history of sclerotherapy done about 10 years ago for cosmetic reasons.  Her legs are equal in symptomatology.  She denies any leg trauma, DVT, superficial phlebitis or nonhealing wounds.  She has not worn compression recently nor had a recent ultrasound.  She now presents to me for evaluation of her bilateral lower extremity varicose veins and pain/achiness.    Past Medical History:   Diagnosis Date     Allergic rhinitis, cause unspecified     allergy shots in the past.      Major depressive disorder, single episode     noted per past records with [facility] physicians     Nosebleed     er visit for tamponade while on nasonex.      Past Surgical History:   Procedure Laterality Date     CYSTOSCOPY, BIOPSY BLADDER, COMBINED N/A 8/6/2018    Procedure: COMBINED CYSTOSCOPY, BIOPSY BLADDER;  Cystoscopy with Bladder Biopsy  ;  Surgeon: Pricila Liang MD;  Location: [facility] OR     ENHANCE LASER REFRACTIVE BILATERAL EXISTING PT IN PARAMETERS Right ~2008    [facility]     HYSTERECTOMY, VAGINAL  1985    age 36, prolonged menses. ovaries remain     Current Outpatient Medications   Medication     Biotin w/ Vitamins C & E (HAIR/SKIN/NAILS PO)     COMPOUNDED NON-CONTROLLED SUBSTANCE (CMPD RX) - PHARMACY TO MIX COMPOUNDED MEDICATION     Multiple Vitamin (DAILY MULTIVITAMIN PO)     oxymetazoline (AFRIN) 0.05 % nasal spray     progesterone (PROMETRIUM) 100 MG capsule     UNABLE TO FIND     vitamin B complex with vitamin C (VITAMIN  B COMPLEX) TABS tablet     No  current facility-administered medications for this visit.        Allergies   Allergen Reactions     Dust Mites      Mold      Seasonal Allergies      Social History     Socioeconomic History     Marital status:      Spouse name: Not on file     Number of children: Not on file     Years of education: Not on file     Highest education level: Not on file   Occupational History     Not on file   Tobacco Use     Smoking status: Former Smoker     Years: 4.00     Types: Cigarettes     Smokeless tobacco: Never Used   Substance and Sexual Activity     Alcohol use: Yes     Comment: occasional glass of wine     Drug use: No     Sexual activity: Yes     Partners: Male   Other Topics Concern     Parent/sibling w/ CABG, MI or angioplasty before 65F 55M? No      Service No     Blood Transfusions No     Caffeine Concern Yes     Comment: 1-2 cups per day     Occupational Exposure No     Hobby Hazards No     Sleep Concern Yes     Comment: Taking melatonin to aid sleep     Stress Concern No     Weight Concern No     Special Diet No     Back Care Yes     Comment: prn     Exercise Yes     Comment: Pilates, stretching and walking daily     Bike Helmet Yes     Seat Belt Yes     Self-Exams No     Comment: occasional, but forgets to do on her own   Social History Narrative     Not on file     Social Determinants of Health     Financial Resource Strain: Not on file   Food Insecurity: Not on file   Transportation Needs: Not on file   Physical Activity: Not on file   Stress: Not on file   Social Connections: Not on file   Intimate Partner Violence: Not on file   Housing Stability: Not on file     Family History   Problem Relation Age of Onset     Eye Disorder Mother         macular degeneration     Macular Degeneration Mother      Arthritis Brother       ROS: 10 point ROS neg other than the symptoms noted above in the HPI.    PE:  B/P: Data Unavailable, T: Data Unavailable, P: Data Unavailable, R: Data Unavailable  General:  well developed, well nourished WF who appears their stated age  HEENT: NC/AT, EOMI, (-)icterus, (-)injection  Neck: Supple, No JVD  Chest: CTA  Heart: S1, S2, (-)m/r/g  Abd: Soft, non tender, non distended, non tender, no masses  Ext; Warm, right lower extremity scattered varicose and spider veins.  Left lower extremity +1 calf edema with varicose and spider veins, greater than right side.  Psych: AAOx3  Neuro: No focal deficits        Impression/plan:  This is a 72-year-old lady with bilateral symptomatic varicose veins.  She is a CEAP 2 on the right and CEAP 3 on the left.  I discussed the etiology of varicose veins as well as the role of ultrasound and compression therapy.  She expressed understanding.  After discussion the patient the plan at this time to start her knee-high compression socks and obtain an ultrasound for reflux.  Any further treatment we determined by the ultrasound findings and her response to compression therapy.  She will follow-up with me after ultrasound.    Krishna Rodriguez MD, FACS

## 2022-07-19 NOTE — NURSING NOTE
Patient Reported symptoms:    Right leg   Heaviness A good bit of the time   Achiness A good bit of the time   Swelling Some of the time   Throbbing None of the time   Itching None of the time   Appearance Very noticeable   Impact on work/activities Symptoms but full able to participate    Left Leg   Heaviness A good bit of the time   Achiness A good bit of the time   Swelling Some of the time   Throbbing None of the time   Itching None of the time   Appearance Very noticeable   Impact on work/activities Symptoms but full able to participate

## 2022-07-25 ENCOUNTER — ANCILLARY PROCEDURE (OUTPATIENT)
Dept: ULTRASOUND IMAGING | Facility: CLINIC | Age: 72
End: 2022-07-25
Attending: SPECIALIST
Payer: COMMERCIAL

## 2022-07-25 DIAGNOSIS — I83.813 VARICOSE VEINS OF LEG WITH PAIN, BILATERAL: ICD-10-CM

## 2022-07-25 PROCEDURE — 93970 EXTREMITY STUDY: CPT | Performed by: SPECIALIST

## 2022-08-02 ENCOUNTER — VIRTUAL VISIT (OUTPATIENT)
Dept: VASCULAR SURGERY | Facility: CLINIC | Age: 72
End: 2022-08-02
Attending: SPECIALIST
Payer: COMMERCIAL

## 2022-08-02 DIAGNOSIS — I83.813 VARICOSE VEINS OF LEG WITH PAIN, BILATERAL: Primary | ICD-10-CM

## 2022-08-02 DIAGNOSIS — I83.892 VARICOSE VEINS OF LEG WITH EDEMA, LEFT: ICD-10-CM

## 2022-08-02 PROCEDURE — 99213 OFFICE O/P EST LOW 20 MIN: CPT | Mod: 95 | Performed by: SPECIALIST

## 2022-08-02 NOTE — PATIENT INSTRUCTIONS
Sclerotherapy: Pre-Treatment Instructions    Recommended Sessions:  1-4 treatment sessions    Pricing: Full session - $254                 *Payment is due at the time of visit following the treatment    Time Required per Treatment Session - About 45 minutes  Please come in 15 minutes before your scheduled appointment.  30 min.  Sclerotherapy treatments last approximately 30 minutes.  5 min.    A staff member will wipe your legs off with warm water and dry them with a wash cloth.                 Then you can put your compression hose on, get dressed and check out.  10 min.  After your treatment, you will be asked to walk around for 10 minutes before you get in your car.    Medications  Five days before your appointment, discontinue aspirin (Bufferin, Anacin, etc.) and Ibuprofen (Motrin, Advil, Aleve, etc.) to reduce bruising. Resume these medications the day following the treatment.    Leg Preparation  Do not shave your legs or apply any oil, lotion or powder the night before or the day of your treatment.    Clothing  Shorts:  Bring a pair of loose, comfortable shorts to wear during your treatment (or you can choose to wear ours). Shoes: Bring comfortable shoes to accommodate the compression hose after your treatment. Do not wear flip-flops or thong-style sandals unless you have open-toe compression hose.    Photographs  Photos will be taken before each treatment. This helps monitor your progress.    Injections  The physician will inject your veins with the sclerotherapy solution chosen to meet your specific needs.    Compression Hose  Please bring your compression hose if you have them. They may also be reserved for you at our clinic. Compression hose must be worn immediately after each sclerotherapy treatment.  The hose must be compression level 20-30, and they must be worn for 24 hours straight after your treatment.  If you have never worn compression hose before, a staff member will teach you how to put them  on.             You cannot have a treatment without compression hose.               They are critical to the success of your treatment!    You may purchase your compression hose from us. We will measure you and have the hose available when you come for your treatment.    Cancellation and Rescheduling  If you need to cancel or reschedule your sclerotherapy treatment, please give our office at least 24 hour notice.    Sclerotherapy: Basic Information        What is sclerotherapy?  Sclerotherapy is a treatment for  spider  veins.  Spider  veins are small veins just under your skin that can look red, blue or purple. Most  spider  veins are only a cosmetic problem.  Spider  veins are not useful and treating them will not affect your circulation.    How does sclerotherapy work?  1.  Injections: A very small needle is used to inject a solution into the  spider  veins. The solution irritates the cells that line the vein walls. This causes the veins to collapse. The vein walls to stick together and they can no longer carry blood. Different solutions are used based on the size of the veins.  2.  Compression:  The spider veins are kept collapsed by wearing compression stockings. Your body will break down and absorb the treated veins. You wear the compression hose for 24 hours after the treatment and then for 4 more days during your waking hours only.    How does the body heal after sclerotherapy?  The process is similar to how your body heals after a bad bruise. It takes 4-6 weeks or more for the healing to be complete. When the healing is complete, the vein is no longer visible. It may take more than one treatment.    How do I get the best results?  It is important to follow the post-sclerotherapy instructions. The best results require time and patience. The injection sites will continue to heal and fade for months after a treatment. Please discuss your expectations with your doctor to keep them realistic. Your doctor will do  everything possible to meet or exceed your expectations.    How many treatments are needed?  After your initial exam, your doctor will give you an estimate of the number of treatments that may be required. It depends upon the size, type, and quantity of your  spider  veins and on the doctor's assessment, your history and expectations. You may end up needing fewer or more treatments.    How soon can I have another treatment?  Additional treatments are scheduled every 4-6 weeks to allow time for the body to respond to the previous treatment.    Common Side Effects:  Itching  The areas that were injected may itch. This is usually mild and lasts less than a day. Do not use lotions or creams on your legs until the injection sites have healed over.    Pain  It is common to have some tenderness at the injection sites. Injection of the solution can be uncomfortable, but is usually well tolerated by most patients. The tenderness is temporary, lasting 24 hours at most. Tylenol or Ibuprofen can be used, if needed, following the product directions.    Bruising  This may occur at the injections sites. Bruising may be minimized by avoiding Aspirin and Ibuprofen products for five days before each treatment session.    Hyperpigmentation  A light brown discoloration of the skin may develop along the veins in the areas injected. Approximately 20-30% of patients treated note the discoloration (which is lighter and less obvious than the veins that are being treated). The hyperpigmentation usually fades in a couple of weeks, but may take several months to a year to totally resolve. There is a 1% chance of hyperpigmentation continuing after one year.    Trapped blood  A small amount of blood may become trapped and hardened in the veins. This may feel like a knot or cord and it may look dark blue or bruised. This is a common occurrence. You may need to return before your next treatment so this area can be drained to remove the trapped  blood. This will reduce the hyperpigmentation that can occur. The chance of this occurring can be decreased with proper use of compression hose after your treatment.    Matting  Matting is the formation of new, fine  spider  veins in the area injected.  It occurs in approximately 10% of patients injected. The exact reason for this is unknown. If untreated, the matting usually resolves in 3 to 12 months, but very rarely, it can be permanent. If the matting does not fade, it can be re-injected.    Rare Side Effects:  Ulceration at injection sites  Very rarely, a small ulcer will occur at the site where a vein is injected. An ulcer can take 4 to 6 weeks to completely heal. A small scar may result.    Allergic reactions  There is a very rare incidence of an allergic reaction to the solution injected. You will be observed for such reaction and will be treated appropriately should it occur. Please inform us of any allergy history.    Pulmonary embolus/Deep vein thrombosis  This is a blood clot which moves to the lungs/a blood clot in the deep vein system. There is an extraordinarily low incidence of this complication.      SCLEROTHERAPY AFTER CARE  Immediately:  After treatment, walk for 10-15 minutes before getting in your car.  If your trip home is more than 1 hour, stop and walk around for 5-10 minutes. Avoid sitting or standing for extended periods.   First 24 hours: Wear your compression continuously, even while in bed. After the 24 hours, you may shower if you want to. Put your hose back on, unless you are going to bed. You should NOT wear compression to bed after the first 24 hours. You may fly the next day, but wear your compression.   For 5 days: Wear the compression hose for waking hours only. You may continue to wear them longer than 5 days if you prefer.   For days 5-7: Walking is encouraged, as it promotes efficient circulation in your veins. You may do activities that raise your heart rate, but do NOT run,  jog, do high impact aerobics, or weight lifting. After 7 days, no activity restrictions.  Shaving: Wait a few days to shave or apply lotion.   Bathing: Do NOT take hot baths or sit in a hot tub for 7-10 days.    For 1 year: Wear SPF 30 sunscreen on your legs when in the sun. This is very important! It helps prevent darkening of the skin at the injection sites.   Medications: You may resume your usual medications, including aspirin or ibuprofen.    Common Things to Expect       Compression must be worn for the first 24 hours and then during the day for 5-7 days.    If larger veins are treated with ultrasound-guided sclerotherapy, you will have redness, firmness, tenderness, and swelling.  This firmness and tenderness may take 3-6 months to resolve. Ibuprofen and compression hose will aid in this process.    You will have bruising that can last up to 3 weeks. Most fading of the veins will occur between 3 and 6 weeks after treatment.    You may notice brown discoloration (hyperpigmentation) at the treatment site.  This should fade with time, but will take 3 months to 1 year to fully heal.     Some treated veins may look darker because of trapped blood within the vein. This trapped blood can be removed at a minimum of 1 month following treatment. Larger veins are more likely to develop trapped blood.    It is very important for you to use at least SPF 30 sunscreen in order to help prevent the discoloration of your skin.    Migraines rarely occur following sclerotherapy, but are more likely in patients with a history of migraines.  Treat as you would any other migraine.      [employer] last reviewed this educational content on 11/1/2019 2000-2021 [employer]. All rights reserved. This information is not intended as a substitute for professional medical care. Always follow your healthcare professional's instructions.

## 2022-08-02 NOTE — PROGRESS NOTES
After Visit Follow Up  Per Dr. PERLA, patient was recommended to have 1 - 4 sessions at $250 of cosmetic sclerotherapy.    Reviewed with and gave to patient our vein clinic sclerotherapy packet of information which includes basic sclerotherapy information, pre-treatment instructions and post-treatment instructions.    Patient in agreement with plan and had no further questions.    Christi Lizarraga MA on 8/2/2022 at 9:07 AM

## 2022-08-02 NOTE — PROGRESS NOTES
Aleksandra is a 72 year old who is being evaluated via a billable video visit.      How would you like to obtain your AVS? MyChart  If the video visit is dropped, the invitation should be resent by: Text to cell phone: [phone]  Will anyone else be joining your video visit? No          Video-Visit Details    Video Start Time: 8:35 AM    Type of service:  Video Visit      Follow-up ultrasound for: Bilateral varicose veins with pain and swelling    Subjective:  Patient is a 72-year-old lady with a longstanding history of bilateral lower extremity varicose veins.  For the past year or so she did notice tiredness achiness and swelling worse at the end of the day.  She works as a / and stands for many hours.  She has a history of sclerotherapy about 10 years ago for mainly cosmetic reasons.  She has a history of sclerotherapy done about 10 years ago for cosmetic reasons.  Her legs are equal in symptomatology.  She denies any leg trauma, DVT, superficial phlebitis or nonhealing wounds.      She had her ultrasound which she now follows up.    Objective:  Ext; Warm, right lower extremity scattered varicose and spider veins.  Left lower extremity +1 calf edema with varicose and spider veins, greater than right side.  Psych: AAOx3  Neuro: No focal deficits    Name:  Aleksandra Michel                                                         Patient ID: [id number]  Date: 2022                                                     : 1950  Sex: female                                                                 Examined by: RONNA Capone RVT  Age:  72 year old                                                         Reading MD: Krishna Rodriguez MD     INDICATION:  Bilateral vv with pain and swelling     EXAM TYPE  BILATERAL LOWER EXTREMITY VENOUS DUPLEX FOR VENOUS INSUFFICIENCY  TECHNICAL SUMMARY     A duplex ultrasound study using color flow was performed, to evaluate the bilateral lower extremity veins  for valvular incompetence with the patient in a steep reversed trendelenberg.      RIGHT:     The deep veins demonstrate phasic flow, compress and respond to augmentations.  There is no DVT.  The proximal to mid femoral veins are  incompetent and free of thrombus. The remaining deep veins are competent and free of thrombus.      The GSV demonstrates phasic flow, compresses and responds to augmentations from the saphenofemoral junction to the ankle with no evidence of reflux or thrombus. The great saphenous vein measures 5.3 mm at the saphenofemoral junction, 3.0 mm at the proximal thigh and 3.3 mm at the knee.  The GSV gives rise to a varicose branch measuring 2.2 mm off the  Knee that courses Anterolateral with a reflux time of 7531 milliseconds.        The AASV is competent ( 3.1 mm) draining into the saphenofemoral junction.      The Giacomini vein is absent.     The SSV demonstrates phasic flow, compresses and responds to augmentations from the popliteal space to the ankle.  No reflux or thrombus is seen. The saphenopopliteal junction is competent (1.4 mm).   The SSV gives rise to a varicose branch measuring 2.1 mm off the Mid Calf that courses Medial with a reflux time of 5889 milliseconds.       Perforators:  There is an incompetent  vein ( 2.1 mm) at distal calf  8 cm from medial mallelous that communicates with an incompetent varicose vein (2.1 mm) coursing medial with a reflux time of 1188 milliseconds.         LEFT:     The deep veins demonstrate phasic flow, compress and respond to augmentations.  There is no  DVT.  The common femoral and proximal femoral veins are  incompetent and free of thrombus. The remaining deep veins are competent and free of thrombus.      The GSV demonstrates phasic flow, compresses and responds to augmentations from the saphenofemoral junction to the ankle with no evidence of thrombus. The GSV is a bifid system from the mid to distal thigh The great saphenous vein  measures 6.0 mm at the saphenofemoral junction, 3.2 mm at the proximal thigh and 3.6 mm at the knee. The GSV is incompetent at the SFJ and from the proximal to mid calf, with the greatest reflux time of 7704 milliseconds.  The GSV gives rise to multiple incompetent varicose veins, the largest measures 3.8 mm off the Proximal Calf that courses Medial with a reflux time of 511 milliseconds.      The AASV is incompetent ( 4.9 mm) draining into the saphenofemoral junction. The AASV is incompetent from the saphenofemoal junction to the proximal thigh with a reflux time of 3497 milliseconds. The AASV takes a straight course for 5 cm. The AASV gives rise to a varicose branch measuring 5.1 mm off the Proximal Thigh that courses Anteromedial with a reflux time of 3250 milliseconds.      The Giacomini vein is competent ( 2.0 mm) communicating with the small saphenous vein at the knee level.       The SSV demonstrates phasic flow, compresses and responds to augmentations from the popliteal space to the ankle.  No reflux or thrombus is seen. The saphenopopliteal junction is incompetent ( 4.6 mm). The SSV is incompetent from the SPJ to proximal calf with a reflux time of 26868 milliseconds.  The SSV gives rise to a varicose branch measuring 3.3 mm off the Proximal Calf that courses Medial with a reflux time of 3085 milliseconds.  The SSV is thick walled from the mid to distal calf.      Perforators: there is no evidence of incompetent  veins at any level.      FINAL SUMMARY:  1.         No evidence of bilateral lower extremity deep vein thrombus.  2.         Right femoral vein incompetence.  3.         Left common femoral and femoral vein incompetence.  4.         Right  vein incompetence.   5.         Right varicose vein incompetence.  6.         Left great saphenous vein incompetence.  7.         Left accessory saphenous vein incompetence.  8.         Left small saphenous vein incompetence.  9.         Left  incompetent varicose veins.   10.       The time of incompetence is greater than 500 milliseconds in the superficial and  veins and greater than 1000 milliseconds in the deep veins.         Krishna Rodriguez MD, FACS    Impression/plan:  This is a 72-year-old lady with bilateral symptomatic varicose veins.  She is a CEAP 2 on the right and CEAP 3 on the left.  I discussed the etiology of varicose veins as well as the role of ultrasound and compression therapy.  Her ultrasound did not reveal any significant superficial right lower extremity reflux.  On the left she does have some ASV reflux as well as some small saphenous vein reflux.  Should she be symptomatic despite compression therapy she would be a candidate for a left AAS V and SSV RF ablation and a bilateral stab phlebectomies.  I briefly described the procedure the patient she expressed understanding.  She would also like an estimate for sclerotherapy of some of the spider veins on her anterior tibias.  She will follow-up with me in 6 weeks.     Krishna Rodriguez MD, FACS          Video End Time:8:55 AM    Originating Location (pt. Location): Home    Distant Location (provider location):  [facility] [place]     Platform used for Video Visit: Rogerio    .  ..

## 2022-08-02 NOTE — LETTER
2022         RE: Aleksandra Michel  [address]  [place] [address]        Dear Colleague,    Thank you for referring your patient, Aleksandra Michel, to the [facility] [place]. Please see a copy of my visit note below.    Aleksandra is a 72 year old who is being evaluated via a billable video visit.      How would you like to obtain your AVS? MyChart  If the video visit is dropped, the invitation should be resent by: Text to cell phone: [phone]  Will anyone else be joining your video visit? No          Video-Visit Details    Video Start Time: 8:35 AM    Type of service:  Video Visit      Follow-up ultrasound for: Bilateral varicose veins with pain and swelling    Subjective:  Patient is a 72-year-old lady with a longstanding history of bilateral lower extremity varicose veins.  For the past year or so she did notice tiredness achiness and swelling worse at the end of the day.  She works as a / and stands for many hours.  She has a history of sclerotherapy about 10 years ago for mainly cosmetic reasons.  She has a history of sclerotherapy done about 10 years ago for cosmetic reasons.  Her legs are equal in symptomatology.  She denies any leg trauma, DVT, superficial phlebitis or nonhealing wounds.      She had her ultrasound which she now follows up.    Objective:  Ext; Warm, right lower extremity scattered varicose and spider veins.  Left lower extremity +1 calf edema with varicose and spider veins, greater than right side.  Psych: AAOx3  Neuro: No focal deficits    Name:  Aleksandra Michel                                                         Patient ID: [id number]  Date: 2022                                                     : 1950  Sex: female                                                                 Examined by: RONNA Capone RVT  Age:  72 year old                                                         Reading MD: Krishna Rodriguez,  MD     INDICATION:  Bilateral vv with pain and swelling     EXAM TYPE  BILATERAL LOWER EXTREMITY VENOUS DUPLEX FOR VENOUS INSUFFICIENCY  TECHNICAL SUMMARY     A duplex ultrasound study using color flow was performed, to evaluate the bilateral lower extremity veins for valvular incompetence with the patient in a steep reversed trendelenberg.      RIGHT:     The deep veins demonstrate phasic flow, compress and respond to augmentations.  There is no DVT.  The proximal to mid femoral veins are  incompetent and free of thrombus. The remaining deep veins are competent and free of thrombus.      The GSV demonstrates phasic flow, compresses and responds to augmentations from the saphenofemoral junction to the ankle with no evidence of reflux or thrombus. The great saphenous vein measures 5.3 mm at the saphenofemoral junction, 3.0 mm at the proximal thigh and 3.3 mm at the knee.  The GSV gives rise to a varicose branch measuring 2.2 mm off the  Knee that courses Anterolateral with a reflux time of 7531 milliseconds.        The AASV is competent ( 3.1 mm) draining into the saphenofemoral junction.      The Giacomini vein is absent.     The SSV demonstrates phasic flow, compresses and responds to augmentations from the popliteal space to the ankle.  No reflux or thrombus is seen. The saphenopopliteal junction is competent (1.4 mm).   The SSV gives rise to a varicose branch measuring 2.1 mm off the Mid Calf that courses Medial with a reflux time of 5889 milliseconds.       Perforators:  There is an incompetent  vein ( 2.1 mm) at distal calf  8 cm from medial mallelous that communicates with an incompetent varicose vein (2.1 mm) coursing medial with a reflux time of 1188 milliseconds.         LEFT:     The deep veins demonstrate phasic flow, compress and respond to augmentations.  There is no  DVT.  The common femoral and proximal femoral veins are  incompetent and free of thrombus. The remaining deep veins are  competent and free of thrombus.      The GSV demonstrates phasic flow, compresses and responds to augmentations from the saphenofemoral junction to the ankle with no evidence of thrombus. The GSV is a bifid system from the mid to distal thigh The great saphenous vein measures 6.0 mm at the saphenofemoral junction, 3.2 mm at the proximal thigh and 3.6 mm at the knee. The GSV is incompetent at the SFJ and from the proximal to mid calf, with the greatest reflux time of 7704 milliseconds.  The GSV gives rise to multiple incompetent varicose veins, the largest measures 3.8 mm off the Proximal Calf that courses Medial with a reflux time of 511 milliseconds.      The AASV is incompetent ( 4.9 mm) draining into the saphenofemoral junction. The AASV is incompetent from the saphenofemoal junction to the proximal thigh with a reflux time of 3497 milliseconds. The AASV takes a straight course for 5 cm. The AASV gives rise to a varicose branch measuring 5.1 mm off the Proximal Thigh that courses Anteromedial with a reflux time of 3250 milliseconds.      The Giacomini vein is competent ( 2.0 mm) communicating with the small saphenous vein at the knee level.       The SSV demonstrates phasic flow, compresses and responds to augmentations from the popliteal space to the ankle.  No reflux or thrombus is seen. The saphenopopliteal junction is incompetent ( 4.6 mm). The SSV is incompetent from the SPJ to proximal calf with a reflux time of 68191 milliseconds.  The SSV gives rise to a varicose branch measuring 3.3 mm off the Proximal Calf that courses Medial with a reflux time of 3085 milliseconds.  The SSV is thick walled from the mid to distal calf.      Perforators: there is no evidence of incompetent  veins at any level.      FINAL SUMMARY:  1.         No evidence of bilateral lower extremity deep vein thrombus.  2.         Right femoral vein incompetence.  3.         Left common femoral and femoral vein incompetence.  4.          Right  vein incompetence.   5.         Right varicose vein incompetence.  6.         Left great saphenous vein incompetence.  7.         Left accessory saphenous vein incompetence.  8.         Left small saphenous vein incompetence.  9.         Left incompetent varicose veins.   10.       The time of incompetence is greater than 500 milliseconds in the superficial and  veins and greater than 1000 milliseconds in the deep veins.         Krishna Rodriguez MD, FACS    Impression/plan:  This is a 72-year-old lady with bilateral symptomatic varicose veins.  She is a CEAP 2 on the right and CEAP 3 on the left.  I discussed the etiology of varicose veins as well as the role of ultrasound and compression therapy.  Her ultrasound did not reveal any significant superficial right lower extremity reflux.  On the left she does have some ASV reflux as well as some small saphenous vein reflux.  Should she be symptomatic despite compression therapy she would be a candidate for a left AAS V and SSV RF ablation and a bilateral stab phlebectomies.  I briefly described the procedure the patient she expressed understanding.  She would also like an estimate for sclerotherapy of some of the spider veins on her anterior tibias.  She will follow-up with me in 6 weeks.     Krishna Rodriguez MD, FACS          Video End Time:8:55 AM    Originating Location (pt. Location): Home    Distant Location (provider location):  [facility] [place]     Platform used for Video Visit: [employer]    .  ..      Again, thank you for allowing me to participate in the care of your patient.        Sincerely,        Krishna Rodriguez MD

## 2022-10-03 ENCOUNTER — HOSPITAL ENCOUNTER (OUTPATIENT)
Dept: MAMMOGRAPHY | Facility: CLINIC | Age: 72
Discharge: HOME | End: 2022-10-03
Attending: NURSE PRACTITIONER | Admitting: NURSE PRACTITIONER
Payer: COMMERCIAL

## 2022-10-03 DIAGNOSIS — Z12.31 VISIT FOR SCREENING MAMMOGRAM: ICD-10-CM

## 2022-10-03 PROCEDURE — 77067 SCR MAMMO BI INCL CAD: CPT

## 2022-10-24 ENCOUNTER — OFFICE VISIT (OUTPATIENT)
Dept: FAMILY MEDICINE | Facility: CLINIC | Age: 72
End: 2022-10-24
Payer: COMMERCIAL

## 2022-10-24 VITALS
DIASTOLIC BLOOD PRESSURE: 70 MMHG | SYSTOLIC BLOOD PRESSURE: 106 MMHG | WEIGHT: 127.3 LBS | TEMPERATURE: 98.2 F | OXYGEN SATURATION: 94 % | HEART RATE: 68 BPM | RESPIRATION RATE: 20 BRPM | BODY MASS INDEX: 23.42 KG/M2 | HEIGHT: 62 IN

## 2022-10-24 DIAGNOSIS — Z01.818 PREOP GENERAL PHYSICAL EXAM: Primary | ICD-10-CM

## 2022-10-24 DIAGNOSIS — H26.9 CATARACT OF RIGHT EYE, UNSPECIFIED CATARACT TYPE: ICD-10-CM

## 2022-10-24 PROCEDURE — 99214 OFFICE O/P EST MOD 30 MIN: CPT | Performed by: INTERNAL MEDICINE

## 2022-10-24 ASSESSMENT — PAIN SCALES - GENERAL: PAINLEVEL: NO PAIN (0)

## 2022-10-24 NOTE — PATIENT INSTRUCTIONS
Preparing for Your Surgery  Getting started  A nurse will call you to review your health history and instructions. They will give you an arrival time based on your scheduled surgery time. Please be ready to share:    Your doctor's clinic name and phone number    Your medical, surgical and anesthesia history    A list of allergies and sensitivities    A list of medicines, including herbal treatments and over-the-counter drugs    Whether the patient has a legal guardian (ask how to send us the papers in advance)  Please tell us if you're pregnant--or if there's any chance you might be pregnant. Some surgeries may injure a fetus (unborn baby), so they require a pregnancy test. Surgeries that are safe for a fetus don't always need a test, and you can choose whether to have one.   If you have a child who's having surgery, please ask for a copy of Preparing for Your Child's Surgery.    Preparing for surgery    Within 10 to 30 days of surgery: Have a pre-op exam (sometimes called an H&P, or History and Physical). This can be done at a clinic or pre-operative center.  ? If you're having a , you may not need this exam. Talk to your care team.    At your pre-op exam, talk to your care team about all medicines you take. If you need to stop any medicines before surgery, ask when to start taking them again.  ? We do this for your safety. Many medicines can make you bleed too much during surgery. Some change how well surgery (anesthesia) drugs work.    Call your insurance company to let them know you're having surgery. (If you don't have insurance, call [phone].)    Call your clinic if there's any change in your health. This includes signs of a cold or flu (sore throat, runny nose, cough, rash, fever). It also includes a scrape or scratch near the surgery site.    If you have questions on the day of surgery, call your hospital or surgery center.  COVID testing  You may need to be tested for COVID-19 before having  surgery. If so, we will give you instructions (or click here).  Eating and drinking guidelines  For your safety: Unless your surgeon tells you otherwise, follow the guidelines below.    Eat and drink as usual until 8 hours before surgery. After that, no food or milk.    Drink clear liquids until 2 hours before surgery. These are liquids you can see through, like water, Gatorade and Propel Water. You may also have black coffee and tea (no cream or milk).    Nothing by mouth within 2 hours of surgery. This includes gum, candy and breath mints.    If you drink alcohol: Stop drinking it the night before surgery.    If your care team tells you to take medicine on the morning of surgery, it's okay to take it with a sip of water.  Preventing infection    Shower or bathe the night before and morning of your surgery. Follow the instructions your clinic gave you. (If no instructions, use regular soap.)    Don't shave or clip hair near your surgery site. We'll remove the hair if needed.    Don't smoke or vape the morning of surgery. You may chew nicotine gum up to 2 hours before surgery. A nicotine patch is okay.  ? Note: Some surgeries require you to completely quit smoking and nicotine. Check with your surgeon.    Your care team will make every effort to keep you safe from infection. We will:  ? Clean our hands often with soap and water (or an alcohol-based hand rub).  ? Clean the skin at your surgery site with a special soap that kills germs.  ? Give you a special gown to keep you warm. (Cold raises the risk of infection.)  ? Wear special hair covers, masks, gowns and gloves during surgery.  ? Give antibiotic medicine, if prescribed. Not all surgeries need antibiotics.  What to bring on the day of surgery    Photo ID and insurance card    Copy of your health care directive, if you have one    Glasses and hearing aides (bring cases)  ? You can't wear contacts during surgery    Inhaler and eye drops, if you use them (tell us  about these when you arrive)    CPAP machine or breathing device, if you use them    A few personal items, if spending the night    If you have . . .  ? A pacemaker, ICD (cardiac defibrillator) or other implant: Bring the ID card.  ? An implanted stimulator: Bring the remote control.  ? A legal guardian: Bring a copy of the certified (court-stamped) guardianship papers.  Please remove any jewelry, including body piercings. Leave jewelry and other valuables at home.  If you're going home the day of surgery    You must have a responsible adult drive you home. They should stay with you overnight as well.    If you don't have someone to stay with you, and you aren't safe to go home alone, we may keep you overnight. Insurance often won't pay for this.  After surgery  If it's hard to control your pain or you need more pain medicine, please call your surgeon's office.  Questions?   If you have any questions for your care team, list them here: _________________________________________________________________________________________________________________________________________________________________________ ____________________________________ ____________________________________ ____________________________________  For informational purposes only. Not to replace the advice of your health care provider. Copyright   2003, 2019 [facility]. All rights reserved. Clinically reviewed by Myra Dodd MD. [employer] [id number] - REV 07/22.

## 2022-10-24 NOTE — PROGRESS NOTES
[address] [address]  Phone: [phone]  Primary Provider: Stephany Hannon  Pre-op Performing Provider: MILLY NINA      PREOPERATIVE EVALUATION:  Today's date: 10/24/2022    Aleksandra Michel is a 72 year old female who presents for a preoperative evaluation.    Surgical Information:  Surgery/Procedure: cataract right eye   Surgery Location: [facility]  Surgeon: Dr. Ruelas   Surgery Date: 11/11/22  Time of Surgery: TBD   Where patient plans to recover: At home with family  Fax number for surgical facility: [phone]    Type of Anesthesia Anticipated: Local    Assessment & Plan     The proposed surgical procedure is considered LOW risk.    Preop general physical exam  In good physical health  No history of any CAD or or diabetes  Excess tolerance is very good  She has at least 7 METS or more of exercise tolerance  Denies any exertional chest pain or shortness of breath  She is not on any anticoagulants  She is medically optimized for surgery    Cataract of right eye, unspecified cataract type  She had a cataract in the left eye which was operated in the past and now she is going to get right eye operated             Risks and Recommendations:  The patient has the following additional risks and recommendations for perioperative complications:   - No identified additional risk factors other than previously addressed    Medication Instructions:  Patient is to take all scheduled medications on the day of surgery    RECOMMENDATION:  APPROVAL GIVEN to proceed with proposed procedure, without further diagnostic evaluation.              30 minutes spent on the date of the encounter doing chart review, history and exam, documentation and further activities per the note        Subjective     HPI related to upcoming procedure: She has a left eye cataract which was operated in the past now she is having the surgery done in the  right eye    Preop Questions 10/24/2022   1. Have you ever had a heart attack or stroke? No   2. Have you ever had surgery on your heart or blood vessels, such as a stent placement, a coronary artery bypass, or surgery on an artery in your head, neck, heart, or legs? No   3. Do you have chest pain with activity? No   4. Do you have a history of  heart failure? No   5. Do you currently have a cold, bronchitis or symptoms of other infection? No   6. Do you have a cough, shortness of breath, or wheezing? No   7. Do you or anyone in your family have previous history of blood clots? No   8. Do you or does anyone in your family have a serious bleeding problem such as prolonged bleeding following surgeries or cuts? No   9. Have you ever had problems with anemia or been told to take iron pills? No   10. Have you had any abnormal blood loss such as black, tarry or bloody stools, or abnormal vaginal bleeding? No   11. Have you ever had a blood transfusion? No   12. Are you willing to have a blood transfusion if it is medically needed before, during, or after your surgery? NO    13. Have you or any of your relatives ever had problems with anesthesia? UNKNOWN    14. Do you have sleep apnea, excessive snoring or daytime drowsiness? No   15. Do you have any artifical heart valves or other implanted medical devices like a pacemaker, defibrillator, or continuous glucose monitor? No   16. Do you have artificial joints? No   17. Are you allergic to latex? No       Health Care Directive:  Patient has a Health Care Directive on file      Status of Chronic Conditions:  See problem list for active medical problems.  Problems all longstanding and stable, except as noted/documented.  See ROS for pertinent symptoms related to these conditions.      Review of Systems  Constitutional, neuro, ENT, endocrine, pulmonary, cardiac, gastrointestinal, genitourinary, musculoskeletal, integument and psychiatric systems are negative, except as  otherwise noted.    Patient Active Problem List    Diagnosis Date Noted     Vaginal vault prolapse 01/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  Aleksandra Michel has no plan or code status on file. Discussed available resources and provided with information. Added by Ronald Irwin on 3/27/2013             CARDIOVASCULAR SCREENING; LDL GOAL LESS THAN 160 06/22/2012     Priority: Medium     Colon polyp 06/21/2012     Priority: Medium     2010??, due Q 5 years.        Seasonal allergic rhinitis 06/21/2012     Priority: Medium     Osteoporosis without current pathological fracture 06/21/2012     Priority: Medium     On Fosomax   Problem list name updated by automated process. Provider to review        Past Medical History:   Diagnosis Date     Allergic rhinitis, cause unspecified     allergy shots in the past.      Major depressive disorder, single episode     noted per past records with [facility]     Nosebleed     er visit for tamponade while on nasonex.      Past Surgical History:   Procedure Laterality Date     CYSTOSCOPY, BIOPSY BLADDER, COMBINED N/A 8/6/2018    Procedure: COMBINED CYSTOSCOPY, BIOPSY BLADDER;  Cystoscopy with Bladder Biopsy  ;  Surgeon: Pricila Liang MD;  Location: [facility] OR     ENHANCE LASER REFRACTIVE BILATERAL EXISTING PT IN PARAMETERS Right ~2008    Distance eye     HYSTERECTOMY, VAGINAL  1985    age 36, prolonged menses. ovaries remain     Current Outpatient Medications   Medication Sig Dispense Refill     Biotin w/ Vitamins C & E (HAIR/SKIN/NAILS PO) Take 1 capsule by mouth daily       COMPOUNDED NON-CONTROLLED SUBSTANCE (CMPD RX) - PHARMACY TO MIX COMPOUNDED MEDICATION estrodiol  "1mg /gram-use dime size amount to the vagina 2-3 x a week at night 50 g 3     Multiple Vitamin (DAILY MULTIVITAMIN PO) Take 1 tablet by mouth daily.       oxymetazoline (AFRIN) 0.05 % nasal spray Spray 2 sprays into both nostrils 2 times daily       progesterone (PROMETRIUM) 100 MG capsule Take 100 mg by mouth daily       UNABLE TO FIND MEDICATION NAME: super brain booster       vitamin B complex with vitamin C (STRESS TAB) tablet Take 1 tablet by mouth daily         Allergies   Allergen Reactions     Dust Mites      Mold      Seasonal Allergies         Social History     Tobacco Use     Smoking status: Former     Years: 4.00     Types: Cigarettes     Smokeless tobacco: Never   Substance Use Topics     Alcohol use: Yes     Comment: occasional glass of wine       History   Drug Use No         Objective     /70 (BP Location: Right arm, Patient Position: Sitting, Cuff Size: Adult Regular)   Pulse 68   Temp 98.2  F (36.8  C) (Oral)   Resp 20   Ht 1.58 m (5' 2.21\")   Wt 57.7 kg (127 lb 4.8 oz)   SpO2 94%   BMI 23.13 kg/m      Physical Exam    GENERAL APPEARANCE: healthy, alert and no distress     EYES: Cataract in right eye     NECK: no adenopathy, no asymmetry, masses, or scars and thyroid normal to palpation     RESP: lungs clear to auscultation - no rales, rhonchi or wheezes     CV: regular rates and rhythm, normal S1 S2, no S3 or S4 and no murmur, click or rub     MS: extremities normal- no gross deformities noted, no evidence of inflammation in joints, FROM in all extremities.     SKIN: no suspicious lesions or rashes     NEURO: Normal strength and tone, sensory exam grossly normal, mentation intact and speech normal     PSYCH: mentation appears normal. and affect normal/bright     LYMPHATICS: No cervical adenopathy    Recent Labs   Lab Test 12/04/20  0955      POTASSIUM 4.0   CR 0.72        Diagnostics:  No labs were ordered during this visit.   No EKG required for low risk surgery (cataract, " skin procedure, breast biopsy, etc).    Revised Cardiac Risk Index (RCRI):  The patient has the following serious cardiovascular risks for perioperative complications:   - No serious cardiac risks = 0 points     RCRI Interpretation: 0 points: Class I (very low risk - 0.4% complication rate)           Signed Electronically by: David Tamayo MD  Copy of this evaluation report is provided to requesting physician.

## 2022-11-01 ENCOUNTER — OFFICE VISIT (OUTPATIENT)
Dept: VASCULAR SURGERY | Facility: CLINIC | Age: 72
End: 2022-11-01
Payer: COMMERCIAL

## 2022-11-01 DIAGNOSIS — I83.813 VARICOSE VEINS OF LEG WITH PAIN, BILATERAL: Primary | ICD-10-CM

## 2022-11-01 DIAGNOSIS — I78.1 SPIDER VEINS: Primary | ICD-10-CM

## 2022-11-01 DIAGNOSIS — I83.892 VARICOSE VEINS OF LEG WITH EDEMA, LEFT: ICD-10-CM

## 2022-11-01 PROCEDURE — A6530 COMPRESSION STOCKING BK18-30: HCPCS

## 2022-11-01 PROCEDURE — 99207 PR NO CHARGE NURSE ONLY: CPT

## 2022-11-01 PROCEDURE — 99213 OFFICE O/P EST LOW 20 MIN: CPT | Performed by: SPECIALIST

## 2022-11-01 NOTE — LETTER
2022         RE: Aleksandra Michel  [address]  [facility] [address]        Dear Colleague,    Thank you for referring your patient, Aleksandra Michel, to the [facility]. Please see a copy of my visit note below.    Follow-up ultrasound for: Bilateral varicose veins with pain and swelling and conservative treatment    Subjective:  Patient is a 72-year-old lady with a longstanding history of bilateral lower extremity varicose veins.  For the past year or so she did notice tiredness achiness and swelling worse at the end of the day.  She works as a / and stands for many hours.  She has a history of sclerotherapy about 10 years ago for mainly cosmetic reasons.  She has a history of sclerotherapy done about 10 years ago for cosmetic reasons.  Her legs are equal in symptomatology.  She denies any leg trauma, DVT, superficial phlebitis or nonhealing wounds.      She has been wearing her compression socks since she was last seen.  She thinks they are working okay would like just to continue to wear them and see how she does.  She also like to follow-up with me in the spring again for further evaluation.    Objective:  Ext; Warm, right lower extremity scattered varicose and spider veins.  Left lower extremity +1 calf edema with varicose and spider veins, greater than right side.  Psych: AAOx3  Neuro: No focal deficits    Name:  Aleksandra Michel                                                         Patient ID: [id number]  Date: 2022                                                     : 1950  Sex: female                                                                 Examined by: RONNA Capone RVT  Age:  72 year old                                                         Reading MD: Krishna Rodriguez MD     INDICATION:  Bilateral vv with pain and swelling     EXAM TYPE  BILATERAL LOWER EXTREMITY VENOUS DUPLEX FOR VENOUS INSUFFICIENCY  TECHNICAL  SUMMARY     A duplex ultrasound study using color flow was performed, to evaluate the bilateral lower extremity veins for valvular incompetence with the patient in a steep reversed trendelenberg.      RIGHT:     The deep veins demonstrate phasic flow, compress and respond to augmentations.  There is no DVT.  The proximal to mid femoral veins are  incompetent and free of thrombus. The remaining deep veins are competent and free of thrombus.      The GSV demonstrates phasic flow, compresses and responds to augmentations from the saphenofemoral junction to the ankle with no evidence of reflux or thrombus. The great saphenous vein measures 5.3 mm at the saphenofemoral junction, 3.0 mm at the proximal thigh and 3.3 mm at the knee.  The GSV gives rise to a varicose branch measuring 2.2 mm off the  Knee that courses Anterolateral with a reflux time of 7531 milliseconds.        The AASV is competent ( 3.1 mm) draining into the saphenofemoral junction.      The Giacomini vein is absent.     The SSV demonstrates phasic flow, compresses and responds to augmentations from the popliteal space to the ankle.  No reflux or thrombus is seen. The saphenopopliteal junction is competent (1.4 mm).   The SSV gives rise to a varicose branch measuring 2.1 mm off the Mid Calf that courses Medial with a reflux time of 5889 milliseconds.       Perforators:  There is an incompetent  vein ( 2.1 mm) at distal calf  8 cm from medial mallelous that communicates with an incompetent varicose vein (2.1 mm) coursing medial with a reflux time of 1188 milliseconds.         LEFT:     The deep veins demonstrate phasic flow, compress and respond to augmentations.  There is no  DVT.  The common femoral and proximal femoral veins are  incompetent and free of thrombus. The remaining deep veins are competent and free of thrombus.      The GSV demonstrates phasic flow, compresses and responds to augmentations from the saphenofemoral junction to the  ankle with no evidence of thrombus. The GSV is a bifid system from the mid to distal thigh The great saphenous vein measures 6.0 mm at the saphenofemoral junction, 3.2 mm at the proximal thigh and 3.6 mm at the knee. The GSV is incompetent at the SFJ and from the proximal to mid calf, with the greatest reflux time of 7704 milliseconds.  The GSV gives rise to multiple incompetent varicose veins, the largest measures 3.8 mm off the Proximal Calf that courses Medial with a reflux time of 511 milliseconds.      The AASV is incompetent ( 4.9 mm) draining into the saphenofemoral junction. The AASV is incompetent from the saphenofemoal junction to the proximal thigh with a reflux time of 3497 milliseconds. The AASV takes a straight course for 5 cm. The AASV gives rise to a varicose branch measuring 5.1 mm off the Proximal Thigh that courses Anteromedial with a reflux time of 3250 milliseconds.      The Giacomini vein is competent ( 2.0 mm) communicating with the small saphenous vein at the knee level.       The SSV demonstrates phasic flow, compresses and responds to augmentations from the popliteal space to the ankle.  No reflux or thrombus is seen. The saphenopopliteal junction is incompetent ( 4.6 mm). The SSV is incompetent from the SPJ to proximal calf with a reflux time of 84987 milliseconds.  The SSV gives rise to a varicose branch measuring 3.3 mm off the Proximal Calf that courses Medial with a reflux time of 3085 milliseconds.  The SSV is thick walled from the mid to distal calf.      Perforators: there is no evidence of incompetent  veins at any level.      FINAL SUMMARY:  1.         No evidence of bilateral lower extremity deep vein thrombus.  2.         Right femoral vein incompetence.  3.         Left common femoral and femoral vein incompetence.  4.         Right  vein incompetence.   5.         Right varicose vein incompetence.  6.         Left great saphenous vein incompetence.  7.          Left accessory saphenous vein incompetence.  8.         Left small saphenous vein incompetence.  9.         Left incompetent varicose veins.   10.       The time of incompetence is greater than 500 milliseconds in the superficial and  veins and greater than 1000 milliseconds in the deep veins.         Krishna Rodriguez MD, FACS    Impression/plan:  This is a 72-year-old lady with bilateral symptomatic varicose veins.  She is a CEAP 2 on the right and CEAP 3 on the left.  I discussed the etiology of varicose veins as well as the role of ultrasound and compression therapy.  Her ultrasound did not reveal any significant superficial right lower extremity reflux.  On the left she does have some ASV reflux as well as some small saphenous vein reflux both her short segment.  The compression socks are currently working for her.  She does have some days where they do not but overall she is happy with them. Should she be symptomatic despite compression therapy she would be a candidate for a left AASV ultrasound-guided sclerotherapy and a bilateral stab phlebectomies.  The SSV segment is too short to treat.  She knows to follow-up should her symptoms change.  She does wish to follow-up in the spring just to see how her legs are doing.  She will follow-up with the spring with me.      Krishna Rodriguez MD, FACS          Again, thank you for allowing me to participate in the care of your patient.        Sincerely,        Krishna Rodriguez MD

## 2022-11-01 NOTE — PROGRESS NOTES
Patient purchased 1 pair(s) of beige, knee high, closed-toe, size 1 compression hose from the clinic today.     Informed patient all compression hose purchases are final.    Christi Lizarraga MA on 11/1/2022 at 8:31 AM

## 2022-11-01 NOTE — PROGRESS NOTES
Follow-up ultrasound for: Bilateral varicose veins with pain and swelling and conservative treatment    Subjective:  Patient is a 72-year-old lady with a longstanding history of bilateral lower extremity varicose veins.  For the past year or so she did notice tiredness achiness and swelling worse at the end of the day.  She works as a / and stands for many hours.  She has a history of sclerotherapy about 10 years ago for mainly cosmetic reasons.  She has a history of sclerotherapy done about 10 years ago for cosmetic reasons.  Her legs are equal in symptomatology.  She denies any leg trauma, DVT, superficial phlebitis or nonhealing wounds.      She has been wearing her compression socks since she was last seen.  She thinks they are working okay would like just to continue to wear them and see how she does.  She also like to follow-up with me in the spring again for further evaluation.    Objective:  Ext; Warm, right lower extremity scattered varicose and spider veins.  Left lower extremity +1 calf edema with varicose and spider veins, greater than right side.  Psych: AAOx3  Neuro: No focal deficits    Name:  Aleksandra Michel                                                         Patient ID: [id number]  Date: 2022                                                     : 1950  Sex: female                                                                 Examined by: RONNA Capone RVT  Age:  72 year old                                                         Reading MD: Krishna Rodriguez MD     INDICATION:  Bilateral vv with pain and swelling     EXAM TYPE  BILATERAL LOWER EXTREMITY VENOUS DUPLEX FOR VENOUS INSUFFICIENCY  TECHNICAL SUMMARY     A duplex ultrasound study using color flow was performed, to evaluate the bilateral lower extremity veins for valvular incompetence with the patient in a steep reversed trendelenberg.      RIGHT:     The deep veins demonstrate phasic flow, compress and  respond to augmentations.  There is no DVT.  The proximal to mid femoral veins are  incompetent and free of thrombus. The remaining deep veins are competent and free of thrombus.      The GSV demonstrates phasic flow, compresses and responds to augmentations from the saphenofemoral junction to the ankle with no evidence of reflux or thrombus. The great saphenous vein measures 5.3 mm at the saphenofemoral junction, 3.0 mm at the proximal thigh and 3.3 mm at the knee.  The GSV gives rise to a varicose branch measuring 2.2 mm off the  Knee that courses Anterolateral with a reflux time of 7531 milliseconds.        The AASV is competent ( 3.1 mm) draining into the saphenofemoral junction.      The Giacomini vein is absent.     The SSV demonstrates phasic flow, compresses and responds to augmentations from the popliteal space to the ankle.  No reflux or thrombus is seen. The saphenopopliteal junction is competent (1.4 mm).   The SSV gives rise to a varicose branch measuring 2.1 mm off the Mid Calf that courses Medial with a reflux time of 5889 milliseconds.       Perforators:  There is an incompetent  vein ( 2.1 mm) at distal calf  8 cm from medial mallelous that communicates with an incompetent varicose vein (2.1 mm) coursing medial with a reflux time of 1188 milliseconds.         LEFT:     The deep veins demonstrate phasic flow, compress and respond to augmentations.  There is no  DVT.  The common femoral and proximal femoral veins are  incompetent and free of thrombus. The remaining deep veins are competent and free of thrombus.      The GSV demonstrates phasic flow, compresses and responds to augmentations from the saphenofemoral junction to the ankle with no evidence of thrombus. The GSV is a bifid system from the mid to distal thigh The great saphenous vein measures 6.0 mm at the saphenofemoral junction, 3.2 mm at the proximal thigh and 3.6 mm at the knee. The GSV is incompetent at the SFJ and from the  proximal to mid calf, with the greatest reflux time of 7704 milliseconds.  The GSV gives rise to multiple incompetent varicose veins, the largest measures 3.8 mm off the Proximal Calf that courses Medial with a reflux time of 511 milliseconds.      The AASV is incompetent ( 4.9 mm) draining into the saphenofemoral junction. The AASV is incompetent from the saphenofemoal junction to the proximal thigh with a reflux time of 3497 milliseconds. The AASV takes a straight course for 5 cm. The AASV gives rise to a varicose branch measuring 5.1 mm off the Proximal Thigh that courses Anteromedial with a reflux time of 3250 milliseconds.      The Giacomini vein is competent ( 2.0 mm) communicating with the small saphenous vein at the knee level.       The SSV demonstrates phasic flow, compresses and responds to augmentations from the popliteal space to the ankle.  No reflux or thrombus is seen. The saphenopopliteal junction is incompetent ( 4.6 mm). The SSV is incompetent from the SPJ to proximal calf with a reflux time of 71587 milliseconds.  The SSV gives rise to a varicose branch measuring 3.3 mm off the Proximal Calf that courses Medial with a reflux time of 3085 milliseconds.  The SSV is thick walled from the mid to distal calf.      Perforators: there is no evidence of incompetent  veins at any level.      FINAL SUMMARY:  1.         No evidence of bilateral lower extremity deep vein thrombus.  2.         Right femoral vein incompetence.  3.         Left common femoral and femoral vein incompetence.  4.         Right  vein incompetence.   5.         Right varicose vein incompetence.  6.         Left great saphenous vein incompetence.  7.         Left accessory saphenous vein incompetence.  8.         Left small saphenous vein incompetence.  9.         Left incompetent varicose veins.   10.       The time of incompetence is greater than 500 milliseconds in the superficial and  veins and  greater than 1000 milliseconds in the deep veins.         Krishna Rodriguez MD, FACS    Impression/plan:  This is a 72-year-old lady with bilateral symptomatic varicose veins.  She is a CEAP 2 on the right and CEAP 3 on the left.  I discussed the etiology of varicose veins as well as the role of ultrasound and compression therapy.  Her ultrasound did not reveal any significant superficial right lower extremity reflux.  On the left she does have some ASV reflux as well as some small saphenous vein reflux both her short segment.  The compression socks are currently working for her.  She does have some days where they do not but overall she is happy with them. Should she be symptomatic despite compression therapy she would be a candidate for a left AASV ultrasound-guided sclerotherapy and a bilateral stab phlebectomies.  The SSV segment is too short to treat.  She knows to follow-up should her symptoms change.  She does wish to follow-up in the spring just to see how her legs are doing.  She will follow-up with the spring with me.      Krishna Rodriguez MD, FACS

## 2022-12-09 ENCOUNTER — TELEPHONE (OUTPATIENT)
Dept: FAMILY MEDICINE | Facility: CLINIC | Age: 72
End: 2022-12-09

## 2022-12-09 DIAGNOSIS — Z12.11 SCREEN FOR COLON CANCER: Primary | ICD-10-CM

## 2022-12-09 NOTE — TELEPHONE ENCOUNTER
Order/Referral Request    Who is requesting: Patient    Orders being requested: Colonoscopy     Reason service is needed/diagnosis: Routine    When are orders needed by: n/a    Has this been discussed with Provider: Yes    Does patient have a preference on a Group/Provider/Facility? Wherever fastest    Does patient have an appointment scheduled?: No    Where to send orders: N/A    Okay to leave a detailed message?: Yes at Cell number on file:    Telephone Information:   Mobile [phone]

## 2022-12-28 ENCOUNTER — TELEPHONE (OUTPATIENT)
Dept: GASTROENTEROLOGY | Facility: CLINIC | Age: 72
End: 2022-12-28

## 2022-12-28 NOTE — TELEPHONE ENCOUNTER
Screening Questions  BLUE  KIND OF PREP RED  LOCATION [review exclusion criteria] GREEN  SEDATION TYPE        Y Are you active on mychart?       Stephany Hannon, VARSHA CNP    Ordering/Referring Provider?        BCBS/MEDICARE What type of coverage do you have?      N Have you had a positive covid test in the last 14 days?     22.1 1. BMI  [BMI 40+ - review exclusion criteria]    Y  2. Are you able to give consent for your medical care? [IF NO,RN REVIEW]        N  3. Are you taking any prescription pain medications on a routine schedule?        3a. EXTENDED PREP What kind of prescription?     N 4. Do you have any chemical dependencies such as alcohol, street drugs, or methadone?    N 5. Do you have any history of post-traumatic stress syndrome, severe anxiety or history of psychosis?      **If yes 3- 5 , please schedule with MAC sedation.**          IF YES TO ANY 6 - 10 - HOSPITAL SETTING ONLY.     N 6.   Do you need assistance transferring?     N 7.   Have you had a heart or lung transplant?    N 8.   Are you currently on dialysis?   N 9.   Do you use daily home oxygen?   N 10. Do you take nitroglycerin?   10a.  If yes, how often?     11. [FEMALES]  N Are you currently pregnant?    11a.  If yes, how many weeks? [ Greater than 12 weeks, OR NEEDED]    N 12. Do you have Pulmonary Hypertension? *NEED PAC APPT AT [facility]*     N 13. [review exclusion criteria]  Do you have any implantable devices in your body (pacemaker, defib, LVAD)?    N 14. In the past 6 months, have you had any heart related issues including cardiomyopathy or heart attack?     14a.  If yes, did it require cardiac stenting if so when?     N 15. Have you had a stroke or Transient ischemic attack (TIA - aka  mini stroke ) within 6 months?      N 16. Do you have mod to severe Obstructive Sleep Apnea?  [Hospital only - Ok at [facility]]    N 17. Do you have SEVERE AND UNCONTROLLED asthma? *NEED PAC APPT AT [facility]*     N 18. Are you currently taking  "any blood thinners?     18a. If yes, inform patient to \"follow up w/ ordering provider for bridging instructions.\"    N 19. Do you take the medication Phentermine?    19a. If yes, \"Hold for 7 days before procedure.  Please consult your prescribing provider if you have questions about holding this medication.\"     N  20. Do you have chronic kidney disease?      N  21. Do you have a diagnosis of diabetes?     N  22. On a regular basis do you go 3-5 days between bowel movements?      23. Preferred LOCAL Pharmacy for Pre Prescription    [ LIST ONLY ONE PHARMACY]        [id number] - [place], [place] - [address] AT [facility] [address]      - CLOSING REMINDERS -    Informed patient they will need an adult    Cannot take any type of public or medical transportation alone    Conscious Sedation- Needs  for 6 hours after the procedure       MAC/General-Needs  for 24 hours after procedure    Pre-Procedure Covid test to be completed [ESSC PCR Testing Required]    Confirmed Nurse will call to complete assessment       - SCHEDULING DETAILS -  NO Hospital Setting Required? If yes, what is the exclusion?:    ROBERT  Surgeon    2/15  Date of Procedure  Lower Endoscopy [Colonoscopy]  [facility]   [facility]ARD GOLYTELY-If you answer yes to questions #8, #20, #21Which Colonoscopy Prep was Sent?     MODERATE Sedation Type     N PAC / Pre-op Required                 "

## 2023-02-08 RX ORDER — BISACODYL 5 MG
TABLET, DELAYED RELEASE (ENTERIC COATED) ORAL
Qty: 4 TABLET | Refills: 0 | Status: SHIPPED | OUTPATIENT
Start: 2023-02-08 | End: 2024-03-01

## 2023-02-14 RX ORDER — LIDOCAINE 40 MG/G
CREAM TOPICAL
Status: CANCELLED | OUTPATIENT
Start: 2023-02-14

## 2023-02-14 RX ORDER — ONDANSETRON 2 MG/ML
4 INJECTION INTRAMUSCULAR; INTRAVENOUS
Status: CANCELLED | OUTPATIENT
Start: 2023-02-14

## 2023-02-15 ENCOUNTER — HOSPITAL ENCOUNTER (OUTPATIENT)
Facility: CLINIC | Age: 73
Discharge: HOME | End: 2023-02-15
Attending: COLON & RECTAL SURGERY | Admitting: COLON & RECTAL SURGERY
Payer: COMMERCIAL

## 2023-02-15 VITALS
RESPIRATION RATE: 20 BRPM | OXYGEN SATURATION: 95 % | DIASTOLIC BLOOD PRESSURE: 92 MMHG | SYSTOLIC BLOOD PRESSURE: 117 MMHG | HEART RATE: 91 BPM

## 2023-02-15 DIAGNOSIS — Z12.11 ENCOUNTER FOR SCREENING COLONOSCOPY: Primary | ICD-10-CM

## 2023-02-15 LAB — COLONOSCOPY: NORMAL

## 2023-02-15 PROCEDURE — 88305 TISSUE EXAM BY PATHOLOGIST: CPT | Mod: TC | Performed by: COLON & RECTAL SURGERY

## 2023-02-15 PROCEDURE — 88305 TISSUE EXAM BY PATHOLOGIST: CPT | Mod: 26 | Performed by: PATHOLOGY

## 2023-02-15 PROCEDURE — G0500 MOD SEDAT ENDO SERVICE >5YRS: HCPCS | Performed by: COLON & RECTAL SURGERY

## 2023-02-15 PROCEDURE — 250N000011 HC RX IP 250 OP 636: Performed by: COLON & RECTAL SURGERY

## 2023-02-15 PROCEDURE — 45385 COLONOSCOPY W/LESION REMOVAL: CPT | Performed by: COLON & RECTAL SURGERY

## 2023-02-15 RX ORDER — LORATADINE 10 MG/1
10 TABLET ORAL DAILY
COMMUNITY

## 2023-02-15 RX ORDER — FENTANYL CITRATE 50 UG/ML
INJECTION, SOLUTION INTRAMUSCULAR; INTRAVENOUS PRN
Status: DISCONTINUED | OUTPATIENT
Start: 2023-02-15 | End: 2023-02-15 | Stop reason: HOSPADM

## 2023-02-15 ASSESSMENT — ACTIVITIES OF DAILY LIVING (ADL): ADLS_ACUITY_SCORE: 35

## 2023-02-15 NOTE — H&P
Colon & Rectal Surgery History and Physical  Pre-Endoscopy Procedure Note    History of Present Illness   I have been asked by Stephany Hannon to evaluate this 72 year old female for colorectal polyp surveillance. She currently denies any abdominal pain, weight loss, bleeding per rectum, or recent change in bowel habits.    Past Medical History  Diagnosis Date     Allergic rhinitis      Major depressive disorder, single episode      Nosebleed        Past Surgical History  Procedure Laterality Date     CYSTOSCOPY, BIOPSY BLADDER, COMBINED N/A 08/06/2018    Procedure: COMBINED CYSTOSCOPY, BIOPSY BLADDER;  Cystoscopy with Bladder Biopsy  ;  Surgeon: Pricila Liang MD;  Location: [facility] OR     ENHANCE LASER REFRACTIVE BILATERAL EXISTING PT IN PARAMETERS Right ~2008    Distance eye     HYSTERECTOMY, VAGINAL  1985    age 36, prolonged menses. ovaries remain     ORTHOPEDIC SURGERY       ULNA PERCUTANEOUS PINNING Right     metal        Medications  Medication Sig     Biotin w/ Vitamins C & E (HAIR/SKIN/NAILS PO) Take 1 capsule by mouth daily     COMPOUNDED NON-CONTROLLED SUBSTANCE (CMPD RX) - PHARMACY TO MIX COMPOUNDED MEDICATION estrodiol 1mg /gram-use dime size amount to the vagina 2-3 x a week at night     loratadine (CLARITIN) 10 MG tablet Take 10 mg by mouth daily     Multiple Vitamin (DAILY MULTIVITAMIN PO) Take 1 tablet by mouth daily.     oxymetazoline (AFRIN) 0.05 % nasal spray Spray 2 sprays into both nostrils 2 times daily     progesterone (PROMETRIUM) 100 MG capsule Take 100 mg by mouth daily     vitamin B complex with vitamin C (STRESS TAB) tablet Take 1 tablet by mouth daily       Allergies  Allergen Reactions     Dust Mites      Mold      Seasonal Allergies         Family History   Family history includes Arthritis in her brother; Eye Disorder in her mother; Macular Degeneration in her mother.     Social History   She reports that she has quit smoking. Her smoking use included cigarettes. She has never used  smokeless tobacco. She reports current alcohol use. She reports that she does not use drugs.    Review of Systems   Constitutional:  No fever, weight change or fatigue.    Eyes:     No dry eyes or vision changes.   Ears/Nose/Throat/Neck:  No oral ulcers, sore throat or voice change.    Cardiovascular:   No palpitations, syncope, angina or edema.   Respiratory:    No chest pain, excessive sleepiness, shortness of breath or hemoptysis.    Gastrointestinal:   No abdominal pain, nausea, vomiting, diarrhea or heartburn.    Genitourinary:   No dysuria, hematuria, urinary retention or urinary frequency.   Musculoskeletal:  No joint swelling or arthralgias.    Dermatologic:  No skin rash or other skin changes.   Neurologic:    No focal weakness or numbness. No neuropathy.   Psychiatric:    No depression, anxiety, suicidal ideation, or paranoid ideation.   Endocrine:   No cold or heat intolerance, polydipsia, hirsutism, change in libido, or flushing.   Hematology/Lymphatic:  No bleeding or lymphadenopathy.    Allergy/Immunology:  No rhinitis or hives.     Physical Exam   Vitals:  /72, , RR 18, SpO2 98 %, not currently breastfeeding.    General:  Alert and oriented to person, place and time   Airway: Normal oropharyngeal airway and neck mobility   Lungs:  Clear bilaterally   Heart:  Regular rate and rhythm   Abdomen: Soft, NT, ND, no masses   Extremities: Warm, good capillary refill    ASA Grade: II (mild systemic disease)    Impression: Cleared for use of conscious sedation for colorectal polyp surveillance    Plan: Proceed with colonoscopy     Yaritza Waite MD  [facility]  [phone]

## 2023-02-17 LAB
PATH REPORT.COMMENTS IMP SPEC: NORMAL
PATH REPORT.COMMENTS IMP SPEC: NORMAL
PATH REPORT.FINAL DX SPEC: NORMAL
PATH REPORT.GROSS SPEC: NORMAL
PATH REPORT.MICROSCOPIC SPEC OTHER STN: NORMAL
PATH REPORT.RELEVANT HX SPEC: NORMAL
PHOTO IMAGE: NORMAL

## 2023-03-26 ENCOUNTER — TRANSFERRED RECORDS (OUTPATIENT)
Dept: HEALTH INFORMATION MANAGEMENT | Facility: CLINIC | Age: 73
End: 2023-03-26

## 2023-07-19 LAB — RETINOPATHY: NORMAL

## 2024-02-28 ENCOUNTER — TRANSFERRED RECORDS (OUTPATIENT)
Dept: HEALTH INFORMATION MANAGEMENT | Facility: CLINIC | Age: 74
End: 2024-02-28
Payer: COMMERCIAL

## 2024-03-01 ENCOUNTER — OFFICE VISIT (OUTPATIENT)
Dept: FAMILY MEDICINE | Facility: CLINIC | Age: 74
End: 2024-03-01
Payer: COMMERCIAL

## 2024-03-01 VITALS
SYSTOLIC BLOOD PRESSURE: 115 MMHG | OXYGEN SATURATION: 98 % | TEMPERATURE: 98.7 F | HEIGHT: 64 IN | BODY MASS INDEX: 25.61 KG/M2 | DIASTOLIC BLOOD PRESSURE: 78 MMHG | WEIGHT: 150 LBS | RESPIRATION RATE: 18 BRPM | HEART RATE: 64 BPM

## 2024-03-01 DIAGNOSIS — Z12.31 ENCOUNTER FOR SCREENING MAMMOGRAM FOR BREAST CANCER: ICD-10-CM

## 2024-03-01 DIAGNOSIS — Z13.0 SCREENING FOR DISORDER OF BLOOD AND BLOOD-FORMING ORGANS: ICD-10-CM

## 2024-03-01 DIAGNOSIS — R79.89 LOW SERUM CALCIUM: ICD-10-CM

## 2024-03-01 DIAGNOSIS — Z13.6 CARDIOVASCULAR SCREENING; LDL GOAL LESS THAN 160: ICD-10-CM

## 2024-03-01 DIAGNOSIS — E28.39 MENOPAUSE OVARIAN FAILURE: ICD-10-CM

## 2024-03-01 DIAGNOSIS — Z13.1 SCREENING FOR DIABETES MELLITUS (DM): ICD-10-CM

## 2024-03-01 DIAGNOSIS — Z13.29 SCREENING FOR THYROID DISORDER: ICD-10-CM

## 2024-03-01 DIAGNOSIS — R94.6 ABNORMAL FINDING ON THYROID FUNCTION TEST: ICD-10-CM

## 2024-03-01 DIAGNOSIS — Z00.00 ENCOUNTER FOR MEDICARE ANNUAL WELLNESS EXAM: Primary | ICD-10-CM

## 2024-03-01 PROCEDURE — G0439 PPPS, SUBSEQ VISIT: HCPCS | Performed by: NURSE PRACTITIONER

## 2024-03-01 SDOH — HEALTH STABILITY: PHYSICAL HEALTH: ON AVERAGE, HOW MANY DAYS PER WEEK DO YOU ENGAGE IN MODERATE TO STRENUOUS EXERCISE (LIKE A BRISK WALK)?: 4 DAYS

## 2024-03-01 ASSESSMENT — PAIN SCALES - GENERAL: PAINLEVEL: NO PAIN (0)

## 2024-03-01 ASSESSMENT — SOCIAL DETERMINANTS OF HEALTH (SDOH): HOW OFTEN DO YOU GET TOGETHER WITH FRIENDS OR RELATIVES?: THREE TIMES A WEEK

## 2024-03-01 NOTE — PATIENT INSTRUCTIONS
PLAN:   1.   Symptomatic therapy suggested: Increase calcium to 1000mg and 1000iu Vit D  2.  Orders Placed This Encounter   Procedures    REVIEW OF HEALTH MAINTENANCE PROTOCOL ORDERS    *MA Screening Digital Bilateral    DX Hip/Pelvis/Spine    Lipid panel reflex to direct LDL Fasting    CBC with platelets    Comprehensive metabolic panel    TSH with free T4 reflex     3.  FURTHER TESTING:       - DXA (bone density) scan       - mammogram  FUTURE LABS:       - Schedule a fasting blood draw   Will follow up and/or notify patient on results via phone or mail to determine further need for followup   Patient needs to follow up in if no improvement,or sooner if worsening of symptoms or other symptoms develop.  Preventive Care Advice   This is general advice given by our system to help you stay healthy. However, your care team may have specific advice just for you. Please talk to your care team about your preventive care needs.  Nutrition  Eat 5 or more servings of fruits and vegetables each day.  Try wheat bread, brown rice and whole grain pasta (instead of white bread, rice, and pasta).  Get enough calcium and vitamin D. Check the label on foods and aim for 100% of the RDA (recommended daily allowance).  Lifestyle  Exercise at least 150 minutes each week   (30 minutes a day, 5 days a week).  Do muscle strengthening activities 2 days a week. These help control your weight and prevent disease.  No smoking.  Wear sunscreen to prevent skin cancer.  Have a dental exam and cleaning every 6 months.  Yearly exams  See your health care team every year to talk about:  Any changes in your health.  Any medicines your care team has prescribed.  Preventive care, family planning, and ways to prevent chronic diseases.  Shots (vaccines)   HPV shots (up to age 26), if you've never had them before.  Hepatitis B shots (up to age 59), if you've never had them before.  COVID-19 shot: Get this shot when it's due.  Flu shot: Get a flu shot  every year.  Tetanus shot: Get a tetanus shot every 10 years.  Pneumococcal, hepatitis A, and RSV shots: Ask your care team if you need these based on your risk.  Shingles shot (for age 50 and up).  General health tests  Diabetes screening:  Starting at age 35, Get screened for diabetes at least every 3 years.  If you are younger than age 35, ask your care team if you should be screened for diabetes.  Cholesterol test: At age 39, start having a cholesterol test every 5 years, or more often if advised.  Bone density scan (DEXA): At age 50, ask your care team if you should have this scan for osteoporosis (brittle bones).  Hepatitis C: Get tested at least once in your life.  STIs (sexually transmitted infections)  Before age 24: Ask your care team if you should be screened for STIs.  After age 24: Get screened for STIs if you're at risk. You are at risk for STIs (including HIV) if:  You are sexually active with more than one person.  You don't use condoms every time.  You or a partner was diagnosed with a sexually transmitted infection.  If you are at risk for HIV, ask about PrEP medicine to prevent HIV.  Get tested for HIV at least once in your life, whether you are at risk for HIV or not.  Cancer screening tests  Cervical cancer screening: If you have a cervix, begin getting regular cervical cancer screening tests at age 21. Most people who have regular screenings with normal results can stop after age 65. Talk about this with your provider.  Breast cancer scan (mammogram): If you've ever had breasts, begin having regular mammograms starting at age 40. This is a scan to check for breast cancer.  Colon cancer screening: It is important to start screening for colon cancer at age 45.  Have a colonoscopy test every 10 years (or more often if you're at risk) Or, ask your provider about stool tests like a FIT test every year or Cologuard test every 3 years.  To learn more about your testing options, visit:  https://www.[employer]/474299.pdf.  For help making a decision, visit: https://bit.ly/nw25338.  Prostate cancer screening test: If you have a prostate and are age 55 to 69, ask your provider if you would benefit from a yearly prostate cancer screening test.  Lung cancer screening: If you are a current or former smoker age 50 to 80, ask your care team if ongoing lung cancer screenings are right for you.  For informational purposes only. Not to replace the advice of your health care provider. Copyright   2023 [employer]. All rights reserved. Clinically reviewed by the [facility]. [employer] 696388 - REV 01/24.

## 2024-03-01 NOTE — PROGRESS NOTES
"Preventive Care Visit  [facility]  Stephany VARSHA Russell CNP, Internal Medicine - Pediatrics  Mar 1, 2024    Assessment & Plan     Encounter for Medicare annual wellness exam  - REVIEW OF HEALTH MAINTENANCE PROTOCOL ORDERS    CARDIOVASCULAR SCREENING; LDL GOAL LESS THAN 160  - Lipid panel reflex to direct LDL Fasting    Screening for diabetes mellitus (DM)  - Comprehensive metabolic panel    Screening for disorder of blood and blood-forming organs  - CBC with platelets    Screening for thyroid disorder  - TSH with free T4 reflex    Encounter for screening mammogram for breast cancer  - *MA Screening Digital Bilateral    Menopause ovarian failure  - DX Hip/Pelvis/Spine      Patient has been advised of split billing requirements and indicates understanding: Yes  Ordering of each unique test  Prescription drug management   Time spent by me doing chart review, history and exam, documentation and further activities per the note      BMI  Estimated body mass index is 25.75 kg/m  as calculated from the following:    Height as of this encounter: 1.626 m (5' 4\").    Weight as of this encounter: 68 kg (150 lb).       Counseling  Appropriate preventive services were discussed with this patient, including applicable screening as appropriate for fall prevention, nutrition, physical activity, Tobacco-use cessation, weight loss and cognition.  Checklist reviewing preventive services available has been given to the patient.  Reviewed patient's diet, addressing concerns and/or questions.   She is at risk for psychosocial distress and has been provided with information to reduce risk.       FUTURE APPOINTMENTS:       - Follow-up for annual visit or as needed  See Patient Instructions    Celso Lake is a 73 year old, presenting for the following:  Wellness Visit (Annual wellness)        3/1/2024    10:48 AM   Additional Questions   Roomed by Gabriella FAIRCHILD   Accompanied by Self         3/1/2024    10:48 " AM   Patient Reported Additional Medications   Patient reports taking the following new medications None         Via the Health Maintenance questionnaire, the patient has reported the following services have been completed -Eye Exam, this information has been sent to the abstraction team.  Health Care Directive  Patient has a Health Care Directive on file  Advance care planning document is on file and is current.    HPI      3/1/2024   General Health   How would you rate your overall physical health? Excellent   Feel stress (tense, anxious, or unable to sleep) Only a little   (!) STRESS CONCERN      3/1/2024   Nutrition   Diet: Other   If other, please elaborate: dairy         3/1/2024   Exercise   Days per week of moderate/strenous exercise 4 days         3/1/2024   Social Factors   Frequency of gathering with friends or relatives Three times a week   Worry food won't last until get money to buy more No   Food not last or not have enough money for food? No   Do you have housing?  Yes   Are you worried about losing your housing? No   Lack of transportation? No   Unable to get utilities (heat,electricity)? No         3/1/2024   Fall Risk   Fallen 2 or more times in the past year? No    No   Trouble with walking or balance? No    No          3/1/2024   Activities of Daily Living- Home Safety   Needs help with the following daily activites None of the above   Safety concerns in the home None of the above         3/1/2024   Dental   Dentist two times every year? Yes         3/1/2024   Hearing Screening   Hearing concerns? None of the above         3/1/2024   Driving Risk Screening   Patient/family members have concerns about driving No         3/1/2024   General Alertness/Fatigue Screening   Have you been more tired than usual lately? (!) DECLINE         3/1/2024   Urinary Incontinence Screening   Bothered by leaking urine in past 6 months No            Today's PHQ-2 Score:       3/1/2024    10:42 AM   PHQ-2 ( 1999  [employer])   Q1: Little interest or pleasure in doing things 0   Q2: Feeling down, depressed or hopeless 1   PHQ-2 Score 1   Q1: Little interest or pleasure in doing things Not at all   Q2: Feeling down, depressed or hopeless Several days   PHQ-2 Score 1           3/1/2024   Substance Use   Alcohol more than 3/day or more than 7/wk No   Do you have a current opioid prescription? No   How severe/bad is pain from 1 to 10? 5/10   Do you use any other substances recreationally? No     Social History     Tobacco Use    Smoking status: Former     Years: 4     Types: Cigarettes    Smokeless tobacco: Never   Vaping Use    Vaping Use: Never used   Substance Use Topics    Alcohol use: Yes     Comment: occasional glass of wine    Drug use: No           10/3/2022   LAST FHS-7 RESULTS   1st degree relative breast or ovarian cancer No   Any relative bilateral breast cancer No   Any male have breast cancer No   Any ONE woman have BOTH breast AND ovarian cancer No   Any woman with breast cancer before 50yrs No   2 or more relatives with breast AND/OR ovarian cancer No   2 or more relatives with breast AND/OR bowel cancer No        Mammogram Screening - After age 74- determine frequency with patient based on health status, life expectancy and patient goals    ASCVD Risk   The 10-year ASCVD risk score (Wayne DK, et al., 2019) is: 13.6%    Values used to calculate the score:      Age: 73 years      Sex: Female      Is Non- : No      Diabetic: No      Tobacco smoker: No      Systolic Blood Pressure: 115 mmHg      Is BP treated: No      HDL Cholesterol: 96 mg/dL      Total Cholesterol: 242 mg/dL      Reviewed and updated as needed this visit by Provider      Problems               Past Medical History:   Diagnosis Date    Allergic rhinitis, cause unspecified     allergy shots in the past.     Major depressive disorder, single episode     noted per past records with [facility]    Nosebleed     er  visit for tamponade while on nasonex.      Past Surgical History:   Procedure Laterality Date    COLONOSCOPY N/A 2/15/2023    Procedure: COLONOSCOPY, FLEXIBLE, WITH LESION REMOVAL USING SNARE;  Surgeon: Yaritza Waite MD;  Location:  GI    CYSTOSCOPY, BIOPSY BLADDER, COMBINED N/A 08/06/2018    Procedure: COMBINED CYSTOSCOPY, BIOPSY BLADDER;  Cystoscopy with Bladder Biopsy  ;  Surgeon: Pricila Liang MD;  Location: [facility]    ENHANCE LASER REFRACTIVE BILATERAL EXISTING PT IN PARAMETERS Right ~2008    Distance eye    HYSTERECTOMY, VAGINAL  1985    age 36, prolonged menses. ovaries remain    ORTHOPEDIC SURGERY      ULNA PERCUTANEOUS PINNING Right     metal     Lab work is in process  Labs reviewed in EPIC  BP Readings from Last 3 Encounters:   03/01/24 115/78   02/15/23 (!) 117/92   10/24/22 106/70    Wt Readings from Last 3 Encounters:   03/01/24 68 kg (150 lb)   10/24/22 57.7 kg (127 lb 4.8 oz)   08/18/21 61.7 kg (136 lb)                  Patient Active Problem List   Diagnosis    Colon polyp    Seasonal allergic rhinitis    Osteoporosis without current pathological fracture    CARDIOVASCULAR SCREENING; LDL GOAL LESS THAN 160    Advanced directives, counseling/discussion    Varicose veins of both lower extremities    Vaginal vault prolapse    Pelvic floor dysfunction     Past Surgical History:   Procedure Laterality Date    COLONOSCOPY N/A 2/15/2023    Procedure: COLONOSCOPY, FLEXIBLE, WITH LESION REMOVAL USING SNARE;  Surgeon: Yaritza Waite MD;  Location:  GI    CYSTOSCOPY, BIOPSY BLADDER, COMBINED N/A 08/06/2018    Procedure: COMBINED CYSTOSCOPY, BIOPSY BLADDER;  Cystoscopy with Bladder Biopsy  ;  Surgeon: Pricila Liang MD;  Location: [facility]    ENHANCE LASER REFRACTIVE BILATERAL EXISTING PT IN PARAMETERS Right ~2008    Distance eye    HYSTERECTOMY, VAGINAL  1985    age 36, prolonged menses. ovaries remain    ORTHOPEDIC SURGERY      ULNA PERCUTANEOUS PINNING Right     metal        Social History     Tobacco Use    Smoking status: Former     Years: 4     Types: Cigarettes    Smokeless tobacco: Never   Substance Use Topics    Alcohol use: Yes     Comment: occasional glass of wine     Family History   Problem Relation Age of Onset    Eye Disorder Mother         macular degeneration    Macular Degeneration Mother     Arthritis Brother          Current Outpatient Medications   Medication Sig Dispense Refill    Biotin w/ Vitamins C & E (HAIR/SKIN/NAILS PO) Take 1 capsule by mouth daily      COMPOUNDED NON-CONTROLLED SUBSTANCE (CMPD RX) - PHARMACY TO MIX COMPOUNDED MEDICATION estrodiol 1mg /gram-use dime size amount to the vagina 2-3 x a week at night 50 g 3    Multiple Vitamin (DAILY MULTIVITAMIN PO) Take 1 tablet by mouth daily.      progesterone (PROMETRIUM) 100 MG capsule Take 100 mg by mouth daily      vitamin B complex with vitamin C (STRESS TAB) tablet Take 1 tablet by mouth daily      bisacodyl (DULCOLAX) 5 MG EC tablet Take 2 tablets at 3 pm the day before your procedure. If your procedure is before 11 am, take 2 additional tablets at 11 pm. If your procedure is after 11 am, take 2 additional tablets at 6 am. For additional instructions refer to your colonoscopy prep instructions. 4 tablet 0    loratadine (CLARITIN) 10 MG tablet Take 10 mg by mouth daily      oxymetazoline (AFRIN) 0.05 % nasal spray Spray 2 sprays into both nostrils 2 times daily      polyethylene glycol (GOLYTELY) 236 g suspension The night before the exam at 6 pm drink an 8-ounce glass every 15 minutes until the jug is half empty. If you arrive before 11 AM: Drink the other half of the Golytely jug at 11 PM night before procedure. If you arrive after 11 AM: Drink the other half of the Golytely jug at 6 AM day of procedure. For additional instructions refer to your colonoscopy prep instructions. 4000 mL 0    UNABLE TO FIND MEDICATION NAME: super brain booster       Allergies   Allergen Reactions    Dust Mites     Mold      Seasonal Allergies      Current providers sharing in care for this patient include:  Patient Care Team:  Stephany Hannon APRN CNP as PCP - General (Family Practice)  Pricila Liang MD as MD (Urology)  Stephany Hannon APRN CNP as Referring Physician (Family Practice)  Krishna Rodriguez MD as Assigned Heart and Vascular Provider  David Tamayo MD as Assigned PCP    The following health maintenance items are reviewed in Epic and correct as of today:  Health Maintenance   Topic Date Due    LUNG CANCER SCREENING  Never done    RSV VACCINE (Pregnancy & 60+) (1 - 1-dose 60+ series) Never done    EYE EXAM  08/28/2019    DTAP/TDAP/TD IMMUNIZATION (4 - Td or Tdap) 03/27/2023    COVID-19 Vaccine (4 - 2023-24 season) 09/01/2023    ANNUAL REVIEW OF HM ORDERS  10/24/2023    GLUCOSE  12/04/2023    MEDICARE ANNUAL WELLNESS VISIT  03/01/2025    FALL RISK ASSESSMENT  03/01/2025    LIPID  12/04/2025    ADVANCE CARE PLANNING  12/04/2025    COLORECTAL CANCER SCREENING  02/15/2028    DEXA  03/12/2033    HEPATITIS C SCREENING  Completed    PHQ-2 (once per calendar year)  Completed    INFLUENZA VACCINE  Completed    Pneumococcal Vaccine: 65+ Years  Completed    ZOSTER IMMUNIZATION  Completed    IPV IMMUNIZATION  Aged Out    HPV IMMUNIZATION  Aged Out    MENINGITIS IMMUNIZATION  Aged Out    RSV MONOCLONAL ANTIBODY  Aged Out    MAMMO SCREENING  Discontinued       CONSTITUTIONAL:POSITIVE  for weight gain and NEGATIVE  for anorexia, arthralgias, and sweats  INTEGUMENTARY/SKIN: NEGATIVE for worrisome rashes, moles or lesions  EYES: NEGATIVE for vision changes or irritation  ENT: NEGATIVE for ear, mouth and throat problems  RESP:NEGATIVE for significant cough or SOB  BREAST: NEGATIVE for masses, tenderness or discharge  CV: NEGATIVE for chest pain, palpitations or peripheral edema  GI: NEGATIVE for nausea, abdominal pain, heartburn, or change in bowel habits   menopausal female: amenorrhea, no unusual urinary  "symptoms, no unusual vaginal symptoms, and vaginal dryness  Dr Kae Lechuga at [facility]   MUSCULOSKELETAL:POSITIVE  for back pain and seeing the chiropractor  and NEGATIVE for joint swelling  and joint warmth   NEURO: NEGATIVE for weakness, dizziness or paresthesias  ENDOCRINE: NEGATIVE for temperature intolerance, skin/hair changes  HEME/ALLERGY/IMMUNE: NEGATIVE for bleeding problems  PSYCHIATRIC: POSITIVE forstress with  recent illness. Has a special needs daughter that lives with them  and NEGATIVE forthoughts of hurting someone else and thoughts of self harm          Objective    Exam  /78 (BP Location: Right arm, Patient Position: Sitting, Cuff Size: Adult Regular)   Pulse 64   Temp 98.7  F (37.1  C) (Temporal)   Resp 18   Ht 1.626 m (5' 4\")   Wt 68 kg (150 lb)   SpO2 98%   BMI 25.75 kg/m     Estimated body mass index is 25.75 kg/m  as calculated from the following:    Height as of this encounter: 1.626 m (5' 4\").    Weight as of this encounter: 68 kg (150 lb).    Physical Exam  GENERAL: alert and no distress  EYES: Eyes grossly normal to inspection and conjunctivae and sclerae normal  HENT: ear canals and TM's normal, nose and mouth without ulcers or lesions  NECK: no adenopathy, no asymmetry, masses, or scars  RESP: lungs clear to auscultation - no rales, rhonchi or wheezes  CV: regular rates and rhythm, no murmur, click or rub, peripheral pulses strong, and no peripheral edema  ABDOMEN: soft, nontender, no hepatosplenomegaly, no masses and bowel sounds normal   (female): deferred  MS: no gross musculoskeletal defects noted, no edema  SKIN: no suspicious lesions or rashes  NEURO: Normal strength and tone, mentation intact and speech normal  NEURO: Normal strength and tone, sensory exam grossly normal, and mentation intact  PSYCH: mentation appears normal, affect normal/bright  LYMPH: no cervical, supraclavicular, axillary, or inguinal adenopathy         3/1/2024   Mini Cog   Clock " Draw Score 2 Normal   3 Item Recall 3 objects recalled   Mini Cog Total Score 5            Signed Electronically by: VARSHA Perkins CNP

## 2024-03-11 ENCOUNTER — LAB (OUTPATIENT)
Dept: LAB | Facility: CLINIC | Age: 74
End: 2024-03-11
Payer: COMMERCIAL

## 2024-03-11 DIAGNOSIS — Z13.29 SCREENING FOR THYROID DISORDER: ICD-10-CM

## 2024-03-11 DIAGNOSIS — Z13.0 SCREENING FOR DISORDER OF BLOOD AND BLOOD-FORMING ORGANS: ICD-10-CM

## 2024-03-11 DIAGNOSIS — Z13.1 SCREENING FOR DIABETES MELLITUS (DM): ICD-10-CM

## 2024-03-11 DIAGNOSIS — Z13.6 CARDIOVASCULAR SCREENING; LDL GOAL LESS THAN 160: ICD-10-CM

## 2024-03-11 LAB
ALBUMIN SERPL BCG-MCNC: 3.8 G/DL (ref 3.5–5.2)
ALP SERPL-CCNC: 73 U/L (ref 40–150)
ALT SERPL W P-5'-P-CCNC: 9 U/L (ref 0–50)
ANION GAP SERPL CALCULATED.3IONS-SCNC: 8 MMOL/L (ref 7–15)
AST SERPL W P-5'-P-CCNC: 28 U/L (ref 0–45)
BILIRUB SERPL-MCNC: 0.5 MG/DL
BUN SERPL-MCNC: 14.3 MG/DL (ref 8–23)
CALCIUM SERPL-MCNC: 8.5 MG/DL (ref 8.8–10.2)
CHLORIDE SERPL-SCNC: 107 MMOL/L (ref 98–107)
CHOLEST SERPL-MCNC: 207 MG/DL
CREAT SERPL-MCNC: 0.89 MG/DL (ref 0.51–0.95)
DEPRECATED HCO3 PLAS-SCNC: 25 MMOL/L (ref 22–29)
EGFRCR SERPLBLD CKD-EPI 2021: 67 ML/MIN/1.73M2
ERYTHROCYTE [DISTWIDTH] IN BLOOD BY AUTOMATED COUNT: 12.8 % (ref 10–15)
FASTING STATUS PATIENT QL REPORTED: YES
GLUCOSE SERPL-MCNC: 94 MG/DL (ref 70–99)
HCT VFR BLD AUTO: 44.6 % (ref 35–47)
HDLC SERPL-MCNC: 54 MG/DL
HGB BLD-MCNC: 15.1 G/DL (ref 11.7–15.7)
LDLC SERPL CALC-MCNC: 132 MG/DL
MCH RBC QN AUTO: 33 PG (ref 26.5–33)
MCHC RBC AUTO-ENTMCNC: 33.9 G/DL (ref 31.5–36.5)
MCV RBC AUTO: 98 FL (ref 78–100)
NONHDLC SERPL-MCNC: 153 MG/DL
PLATELET # BLD AUTO: 261 10E3/UL (ref 150–450)
POTASSIUM SERPL-SCNC: 4.4 MMOL/L (ref 3.4–5.3)
PROT SERPL-MCNC: 6.1 G/DL (ref 6.4–8.3)
RBC # BLD AUTO: 4.57 10E6/UL (ref 3.8–5.2)
SODIUM SERPL-SCNC: 140 MMOL/L (ref 135–145)
T4 FREE SERPL-MCNC: 1.02 NG/DL (ref 0.9–1.7)
TRIGL SERPL-MCNC: 107 MG/DL
TSH SERPL DL<=0.005 MIU/L-ACNC: 5.2 UIU/ML (ref 0.3–4.2)
WBC # BLD AUTO: 6.6 10E3/UL (ref 4–11)

## 2024-03-11 PROCEDURE — 85027 COMPLETE CBC AUTOMATED: CPT

## 2024-03-11 PROCEDURE — 84443 ASSAY THYROID STIM HORMONE: CPT

## 2024-03-11 PROCEDURE — 80061 LIPID PANEL: CPT

## 2024-03-11 PROCEDURE — 80053 COMPREHEN METABOLIC PANEL: CPT

## 2024-03-11 PROCEDURE — 36415 COLL VENOUS BLD VENIPUNCTURE: CPT

## 2024-03-11 PROCEDURE — 84439 ASSAY OF FREE THYROXINE: CPT

## 2024-03-14 ENCOUNTER — TELEPHONE (OUTPATIENT)
Dept: FAMILY MEDICINE | Facility: CLINIC | Age: 74
End: 2024-03-14
Payer: COMMERCIAL

## 2024-03-14 NOTE — RESULT ENCOUNTER NOTE
Please call  Aleksandra Michel,    Attached are your test results.  -Normal red blood cell (hgb) levels, normal white blood cell count and normal platelet levels.  -LDL(bad) cholesterol level is elevated which can increase your heart disease risk.  A diet high in fat and simple carbohydrates, genetics and being overweight can contribute to this. ADVISE: exercising 150 minutes of aerobic exercise per week (30 minutes for 5 days per week or 50 minutes for 3 days per week are options) and eating a low saturated fat/low carbohydrate diet are helpful to improve this. In 12 months, you should recheck your fasting cholesterol panel by scheduling a lab-only appointment.  -Liver and gallbladder tests (ALT,AST, Alk phos,bilirubin) are normal.  -Kidney function (GFR) is normal.  -Sodium is normal.  -Potassium is normal.  -Calcium is decreased.  ADVISE: rechecking this in 1 month.  -Glucose is normal.  -TSH (thyroid stimulating hormone) level can  indicate hypothyroidism (an underactive thyroid).  This can cause a number of symptoms including weight gain, fatigue, high cholesterol, constipation, hair loss, cold intolerance).  ADVISE:rechecking your TSH in 1 month. Hold biotin for 6 weeks prior to blood being drawn    Please contact us if you have any questions.    Stephany Hannon, CNP

## 2024-03-14 NOTE — TELEPHONE ENCOUNTER
RN called patient on 3/14/2024 and LVM to call clinic at [phone].     If patient calls back please relay provider message below. Assist in scheduling lab appointment in 1 month.    FREEMAN Padilla  [facility] Triage      ----- Message from VARSHA Perkins CNP sent at 3/13/2024 11:23 PM CDT -----  Please call  Aleksandra Michel,    Attached are your test results.  -Normal red blood cell (hgb) levels, normal white blood cell count and normal platelet levels.  -LDL(bad) cholesterol level is elevated which can increase your heart disease risk.  A diet high in fat and simple carbohydrates, genetics and being overweight can contribute to this. ADVISE: exercising 150 minutes of aerobic exercise per week (30 minutes for 5 days per week or 50 minutes for 3 days per week are options) and eating a low saturated fat/low carbohydrate diet are helpful to improve this. In 12 months, you should recheck your fasting cholesterol panel by scheduling a lab-only appointment.  -Liver and gallbladder tests (ALT,AST, Alk phos,bilirubin) are normal.  -Kidney function (GFR) is normal.  -Sodium is normal.  -Potassium is normal.  -Calcium is decreased.  ADVISE: rechecking this in 1 month.  -Glucose is normal.  -TSH (thyroid stimulating hormone) level can  indicate hypothyroidism (an underactive thyroid).  This can cause a number of symptoms including weight gain, fatigue, high cholesterol, constipation, hair loss, cold intolerance).  ADVISE:rechecking your TSH in 1 month. Hold biotin for 6 weeks prior to blood being drawn    Please contact us if you have any questions.    Stephany Hannon CNP

## 2024-03-14 NOTE — LETTER
March 14, 2024              Aleksandra Michel  [address]   [place] [address]        [place]ks[place]          Attached are your test results.  -Normal red blood cell (hgb) levels, normal white blood cell count and normal platelet levels.  -LDL(bad) cholesterol level is elevated which can increase your heart disease risk.  A diet high in fat and simple carbohydrates, genetics and being overweight can contribute to this. ADVISE: exercising 150 minutes of aerobic exercise per week (30 minutes for 5 days per week or 50 minutes for 3 days per week are options) and eating a low saturated fat/low carbohydrate diet are helpful to improve this. In 12 months, you should recheck your fasting cholesterol panel by scheduling a lab-only appointment.  -Liver and gallbladder tests (ALT,AST, Alk phos,bilirubin) are normal.  -Kidney function (GFR) is normal.  -Sodium is normal.  -Potassium is normal.  -Calcium is decreased.  ADVISE: rechecking this in 1 month.  -Glucose is normal.  -TSH (thyroid stimulating hormone) level can  indicate hypothyroidism (an underactive thyroid).  This can cause a number of symptoms including weight gain, fatigue, high cholesterol, constipation, hair loss, cold intolerance).  ADVISE:rechecking your TSH in 1 month. Hold biotin for 6 weeks prior to blood being drawn    Please contact us if you have any questions.     Stephany Hannon CNP            Resulted Orders   Lipid panel reflex to direct LDL Fasting   Result Value Ref Range    Cholesterol 207 (H) <200 mg/dL    Triglycerides 107 <150 mg/dL    Direct Measure HDL 54 >=50 mg/dL    LDL Cholesterol Calculated 132 (H) <=100 mg/dL    Non HDL Cholesterol 153 (H) <130 mg/dL    Patient Fasting > 8hrs? Yes     Narrative    Cholesterol  Desirable:  <200 mg/dL    Triglycerides  Normal:  Less than 150 mg/dL  Borderline High:  150-199 mg/dL  High:  200-499 mg/dL  Very High:  Greater than or equal to 500 mg/dL    Direct Measure HDL  Female:  Greater  than or equal to 50 mg/dL   Male:  Greater than or equal to 40 mg/dL    LDL Cholesterol  Desirable:  <100mg/dL  Above Desirable:  100-129 mg/dL   Borderline High:  130-159 mg/dL   High:  160-189 mg/dL   Very High:  >= 190 mg/dL    Non HDL Cholesterol  Desirable:  130 mg/dL  Above Desirable:  130-159 mg/dL  Borderline High:  160-189 mg/dL  High:  190-219 mg/dL  Very High:  Greater than or equal to 220 mg/dL   CBC with platelets   Result Value Ref Range    WBC Count 6.6 4.0 - 11.0 10e3/uL    RBC Count 4.57 3.80 - 5.20 10e6/uL    Hemoglobin 15.1 11.7 - 15.7 g/dL    Hematocrit 44.6 35.0 - 47.0 %    MCV 98 78 - 100 fL    MCH 33.0 26.5 - 33.0 pg    MCHC 33.9 31.5 - 36.5 g/dL    RDW 12.8 10.0 - 15.0 %    Platelet Count 261 150 - 450 10e3/uL   Comprehensive metabolic panel   Result Value Ref Range    Sodium 140 135 - 145 mmol/L      Comment:      Reference intervals for this test were updated on 09/26/2023 to more accurately reflect our healthy population. There may be differences in the flagging of prior results with similar values performed with this method. Interpretation of those prior results can be made in the context of the updated reference intervals.     Potassium 4.4 3.4 - 5.3 mmol/L    Carbon Dioxide (CO2) 25 22 - 29 mmol/L    Anion Gap 8 7 - 15 mmol/L    Urea Nitrogen 14.3 8.0 - 23.0 mg/dL    Creatinine 0.89 0.51 - 0.95 mg/dL    GFR Estimate 67 >60 mL/min/1.73m2    Calcium 8.5 (L) 8.8 - 10.2 mg/dL    Chloride 107 98 - 107 mmol/L    Glucose 94 70 - 99 mg/dL    Alkaline Phosphatase 73 40 - 150 U/L      Comment:      Reference intervals for this test were updated on 11/14/2023 to more accurately reflect our healthy population. There may be differences in the flagging of prior results with similar values performed with this method. Interpretation of those prior results can be made in the context of the updated reference intervals.    AST 28 0 - 45 U/L      Comment:      Reference intervals for this test were updated  on 6/12/2023 to more accurately reflect our healthy population. There may be differences in the flagging of prior results with similar values performed with this method. Interpretation of those prior results can be made in the context of the updated reference intervals.    ALT 9 0 - 50 U/L      Comment:      Reference intervals for this test were updated on 6/12/2023 to more accurately reflect our healthy population. There may be differences in the flagging of prior results with similar values performed with this method. Interpretation of those prior results can be made in the context of the updated reference intervals.      Protein Total 6.1 (L) 6.4 - 8.3 g/dL    Albumin 3.8 3.5 - 5.2 g/dL    Bilirubin Total 0.5 <=1.2 mg/dL   TSH with free T4 reflex   Result Value Ref Range    TSH 5.20 (H) 0.30 - 4.20 uIU/mL   T4 free   Result Value Ref Range    Free T4 1.02 0.90 - 1.70 ng/dL       If you have any questions or concerns, please call the clinic at the number listed above.       Sincerely,

## 2024-03-14 NOTE — TELEPHONE ENCOUNTER
Pt called back and was informed of below msg. She wanted to call back to sched lab. Informed pt I would also send her all the provider recommendations in the mail.  Araceli Quinn CMA

## 2024-03-18 ENCOUNTER — TELEPHONE (OUTPATIENT)
Dept: FAMILY MEDICINE | Facility: CLINIC | Age: 74
End: 2024-03-18
Payer: COMMERCIAL

## 2024-03-18 NOTE — TELEPHONE ENCOUNTER
Reason for Call:  Appointment Request    Patient requesting this type of appt:  Follow up from Urgent Care    Requested provider: Stephany Hannon    Reason patient unable to be scheduled: Not within requested timeframe    When does patient want to be seen/preferred time: 3-7 days    Comments: Sinus pressure/condition is not improving. Yellow/bloody mucus when blowing nose.    Okay to leave a detailed message?: Yes at Home number on file [phone] (home)    Call taken on 3/18/2024 at 3:14 PM by Radha Macario

## 2024-03-19 ENCOUNTER — OFFICE VISIT (OUTPATIENT)
Dept: FAMILY MEDICINE | Facility: CLINIC | Age: 74
End: 2024-03-19
Payer: COMMERCIAL

## 2024-03-19 VITALS
HEIGHT: 62 IN | HEART RATE: 79 BPM | WEIGHT: 149.8 LBS | RESPIRATION RATE: 17 BRPM | BODY MASS INDEX: 27.57 KG/M2 | SYSTOLIC BLOOD PRESSURE: 124 MMHG | OXYGEN SATURATION: 99 % | DIASTOLIC BLOOD PRESSURE: 85 MMHG | TEMPERATURE: 98.3 F

## 2024-03-19 DIAGNOSIS — J20.9 ACUTE BRONCHITIS, UNSPECIFIED ORGANISM: ICD-10-CM

## 2024-03-19 DIAGNOSIS — J01.10 ACUTE NON-RECURRENT FRONTAL SINUSITIS: Primary | ICD-10-CM

## 2024-03-19 PROCEDURE — 99213 OFFICE O/P EST LOW 20 MIN: CPT | Performed by: NURSE PRACTITIONER

## 2024-03-19 NOTE — PATIENT INSTRUCTIONS
PLAN:   1.   Symptomatic therapy suggested: rest, increase fluids, apply heat to sinuses prn, use mist of vaporizer prn, and call prn if symptoms persist or worsen.  2.  Orders Placed This Encounter   Medications    amoxicillin-clavulanate (AUGMENTIN) 875-125 MG tablet     Sig: Take 1 tablet by mouth 2 times daily     Dispense:  20 tablet     Refill:  0     3.   Patient needs to follow up in if no improvement,or sooner if worsening of symptoms or other symptoms develop.

## 2024-03-19 NOTE — PROGRESS NOTES
Celso Lake is a 73 year old, presenting for the following health issues:  Hospital F/U        3/19/2024     3:17 PM   Additional Questions   Roomed by Romi   Accompanied by spouce   Failed to redirect to the Timeline version of the [place] Smart[employer].  ED/ Followup:    Facility:  [facility]  Date of visit: 3/14/2024  Reason for visit: cough  Current Status: Starting to get headaches on the left side, cough has got better and hard to sleep at times     ED COURSE / IMPRESSION AND PLAN:  11:33 AM I performed my initial evaluation of the patient. We discussed Emergency Department course including testing, treatment, and disposition. Personal Protective Equipment (PPE) worn into the room during this encounter included surgical mask.  ED Course as of 03/14/24 1335  Thu Mar 14, 2024  1243 Reviewed and independently interpreted patient's chest x-ray. No obvious infiltrate, awaiting official radiology read. [BR]  1243 STREP A ANTIGEN : Negative [BR]  1243 COVID 19 ALLINA MOLECULAR: Not detected [BR]  1243 INFLUENZA A PCR: NOT Detected [BR]  1243 INFLUENZA B PCR: NOT Detected [BR]  1330 MDM: Aleksandra Michel is a 73 y.o. female who presents to the ED today for evaluation of cough and scratchy throat. On exam she appears well. Normal heart and lung sounds, HEENT exam is unremarkable. COVID and influenza along with strep testing negative as above. Chest x-ray also negative. Likely viral illness. Patient will also continue using her allergy medications. Appears well, normal vitals. Will follow-up with primary care, otherwise return to the ED if symptoms worsen. Given good return precautions. [BR]      Acute Illness  Acute illness concerns: about 3 weeks has been going on   Onset/Duration: 3 weeks    Symptoms:  Fever: No  Chills/Sweats: YES  Headache (location?): YES- over the left eye   Sinus Pressure: YES  Conjunctivitis:  No  Ear Pain: YES: left  Rhinorrhea: No  Congestion: YES  Sore Throat:  No  Cough: YES-productive of clear sputum  Wheeze: YES  Decreased Appetite: No  Nausea: No  Vomiting: No  Diarrhea: No  Dysuria/Freq.: No  Dysuria or Hematuria: No  Fatigue/Achiness: YES  Sick/Strep Exposure: No  Therapies tried and outcome: Nyquil   Labs reviewed in EPIC  BP Readings from Last 3 Encounters:   03/19/24 124/85   03/01/24 115/78   02/15/23 (!) 117/92    Wt Readings from Last 3 Encounters:   03/19/24 67.9 kg (149 lb 12.8 oz)   03/01/24 68 kg (150 lb)   10/24/22 57.7 kg (127 lb 4.8 oz)                  Patient Active Problem List   Diagnosis    Colon polyp    Seasonal allergic rhinitis    Osteoporosis without current pathological fracture    CARDIOVASCULAR SCREENING; LDL GOAL LESS THAN 160    Advanced directives, counseling/discussion    Varicose veins of both lower extremities    Vaginal vault prolapse    Pelvic floor dysfunction     Past Surgical History:   Procedure Laterality Date    COLONOSCOPY N/A 2/15/2023    Procedure: COLONOSCOPY, FLEXIBLE, WITH LESION REMOVAL USING SNARE;  Surgeon: Yaritza Waite MD;  Location:  GI    CYSTOSCOPY, BIOPSY BLADDER, COMBINED N/A 08/06/2018    Procedure: COMBINED CYSTOSCOPY, BIOPSY BLADDER;  Cystoscopy with Bladder Biopsy  ;  Surgeon: Pricila Liang MD;  [facility]    ENHANCE LASER REFRACTIVE BILATERAL EXISTING PT IN PARAMETERS Right ~2008    Distance eye    HYSTERECTOMY, VAGINAL  1985    age 36, prolonged menses. ovaries remain    ORTHOPEDIC SURGERY      ULNA PERCUTANEOUS PINNING Right     metal       Social History     Tobacco Use    Smoking status: Former     Years: 4     Types: Cigarettes    Smokeless tobacco: Never   Substance Use Topics    Alcohol use: Yes     Comment: occasional glass of wine     Family History   Problem Relation Age of Onset    Eye Disorder Mother         macular degeneration    Macular Degeneration Mother     Arthritis Brother          Current Outpatient Medications   Medication Sig Dispense Refill    Biotin w/  "Vitamins C & E (HAIR/SKIN/NAILS PO) Take 1 capsule by mouth daily      COMPOUNDED NON-CONTROLLED SUBSTANCE (CMPD RX) - PHARMACY TO MIX COMPOUNDED MEDICATION estrodiol 1mg /gram-use dime size amount to the vagina 2-3 x a week at night 50 g 3    loratadine (CLARITIN) 10 MG tablet Take 10 mg by mouth daily      Multiple Vitamin (DAILY MULTIVITAMIN PO) Take 1 tablet by mouth daily.      oxymetazoline (AFRIN) 0.05 % nasal spray Spray 2 sprays into both nostrils 2 times daily      progesterone (PROMETRIUM) 100 MG capsule Take 100 mg by mouth daily      UNABLE TO FIND MEDICATION NAME: super brain booster      vitamin B complex with vitamin C (STRESS TAB) tablet Take 1 tablet by mouth daily       Allergies   Allergen Reactions    Dust Mites     Mold     Seasonal Allergies        Review of Systems  Constitutional, HEENT, cardiovascular, pulmonary, gi and gu systems are negative, except as otherwise noted.      Objective    /85 (BP Location: Right arm, Patient Position: Sitting, Cuff Size: Adult Regular)   Pulse 79   Temp 98.3  F (36.8  C) (Oral)   Resp 17   Ht 1.575 m (5' 2\")   Wt 67.9 kg (149 lb 12.8 oz)   SpO2 99%   BMI 27.40 kg/m    Body mass index is 27.4 kg/m .  GENERAL: Patient is well nourished, well developed, obese,in no apparent distress, non-toxic, in no respiratory distress and acyanotic, alert, cooperative, and well hydrated  mildly ill but alert and responsive  EYES:  Right conjunctiva is not injected and without discharge.  Left conjunctiva is not injected and without discharge.  EARS: negative findings: external ears normal to inspection and palpation, no mastoid process tenderness, positive findings: , Right TM: serous middle ear fluid, Left TM: serous middle ear fluid  NOSE: negative findings: septum midline with no perforation or bleeding, positive findings: mucosa erythematous and swollen,  Sinus maxillary tenderness on bilateral.  THROAT: normal.  NECK: supple with no adenopathy, "   CARDIAC:NORMAL - regular rate and rhythm without murmur.  RESP: normal respiratory rate and rhythm  barky cough  ABD: Abdomen soft, non-tender.  SKIN: Skin color, texture, turgor normal. No rashes or lesions.  MS: extremities normal- no gross deformities noted, gait normal, and normal muscle tone       Assessment & Plan     Acute non-recurrent frontal sinusitis  Discussed the nature and pathophysiology of sinusitis and treatment including the role of antibiotics and the need to get over the underlying trigger, URI or allergies.   Will Start:  - amoxicillin-clavulanate (AUGMENTIN) 875-125 MG tablet  Dispense: 20 tablet; Refill: 0    Acute bronchitis, unspecified organism  I discussed the pathophysiology of bronchitis and likely viral etiology.  I reviewed the risks and benefits of various over the counter and prescription medications.  Additionally, we reviewed the infectious nature of such infections and techniques to minimize transmission and future infections.  Will Start:  - amoxicillin-clavulanate (AUGMENTIN) 875-125 MG tablet  Dispense: 20 tablet; Refill: 0         Time spent by me doing chart review, history and exam, documentation and further activities per the note    MED REC REQUIRED  Post Medication Reconciliation Status: discharge medications reconciled and changed, per note/orders    FUTURE APPOINTMENTS:       - Follow-up for annual visit or as needed  See Patient Instructions        Signed Electronically by: VARSHA Perkins CNP

## 2024-04-03 ENCOUNTER — ANCILLARY PROCEDURE (OUTPATIENT)
Dept: BONE DENSITY | Facility: CLINIC | Age: 74
End: 2024-04-03
Attending: NURSE PRACTITIONER
Payer: COMMERCIAL

## 2024-04-03 ENCOUNTER — ANCILLARY PROCEDURE (OUTPATIENT)
Dept: MAMMOGRAPHY | Facility: CLINIC | Age: 74
End: 2024-04-03
Attending: NURSE PRACTITIONER
Payer: COMMERCIAL

## 2024-04-03 DIAGNOSIS — Z12.31 ENCOUNTER FOR SCREENING MAMMOGRAM FOR BREAST CANCER: ICD-10-CM

## 2024-04-03 DIAGNOSIS — E28.39 MENOPAUSE OVARIAN FAILURE: ICD-10-CM

## 2024-04-03 PROCEDURE — 77067 SCR MAMMO BI INCL CAD: CPT | Mod: GC | Performed by: RADIOLOGY

## 2024-04-03 PROCEDURE — 77063 BREAST TOMOSYNTHESIS BI: CPT | Mod: GC | Performed by: RADIOLOGY

## 2024-04-03 PROCEDURE — 77080 DXA BONE DENSITY AXIAL: CPT | Performed by: RADIOLOGY

## 2024-04-04 ENCOUNTER — OFFICE VISIT (OUTPATIENT)
Dept: FAMILY MEDICINE | Facility: CLINIC | Age: 74
End: 2024-04-04
Payer: COMMERCIAL

## 2024-04-04 ENCOUNTER — TELEPHONE (OUTPATIENT)
Dept: FAMILY MEDICINE | Facility: CLINIC | Age: 74
End: 2024-04-04

## 2024-04-04 VITALS
HEIGHT: 63 IN | TEMPERATURE: 98.7 F | WEIGHT: 152 LBS | DIASTOLIC BLOOD PRESSURE: 80 MMHG | BODY MASS INDEX: 26.93 KG/M2 | HEART RATE: 72 BPM | RESPIRATION RATE: 20 BRPM | OXYGEN SATURATION: 95 % | SYSTOLIC BLOOD PRESSURE: 124 MMHG

## 2024-04-04 DIAGNOSIS — J30.1 SEASONAL ALLERGIC RHINITIS DUE TO POLLEN: Primary | ICD-10-CM

## 2024-04-04 PROCEDURE — 99213 OFFICE O/P EST LOW 20 MIN: CPT | Performed by: FAMILY MEDICINE

## 2024-04-04 RX ORDER — AZELASTINE 1 MG/ML
1 SPRAY, METERED NASAL 2 TIMES DAILY
Qty: 30 ML | Refills: 1 | Status: SHIPPED | OUTPATIENT
Start: 2024-04-04

## 2024-04-04 ASSESSMENT — PAIN SCALES - GENERAL: PAINLEVEL: NO PAIN (0)

## 2024-04-04 NOTE — PATIENT INSTRUCTIONS
Important Takeaway Points From This Visit:     We will try the nasal spray first.    If this is not helpful after 2 weeks, we will switch and try an antibiotic called doxycycline twice daily for 10 days. This can cause diarrhea and sensitivity of your skin to sunlight (wear long clothing and sunscreen if outdoors while taking this). It becomes ineffective if taken with minerals (vitamins that contain iron, magnesium, calcium).    If still not improving we will get a sinus CT and have you see ENT (Ear nose and throat), and/or an allergist.    Thank you,    Dr. Cid

## 2024-04-04 NOTE — TELEPHONE ENCOUNTER
Reason for Call:  Appointment Request    Patient requesting this type of appt: Chronic Diease Management/Medication/Follow-Up    Requested provider: Stephany Hannon    Reason patient unable to be scheduled: Not within requested timeframe    When does patient want to be seen/preferred time: Same day    Comments: Aleksandra was in on 3/19 for flu/sinus symptoms and was prescribed antibiotics. The pt has finished the antibiotics and is still having symptoms and has gotten worse. Pt would like to schedule a same day appt with pcp. Clinic will have to call pt to schedule an appt.    Okay to leave a detailed message?: Yes at Cell number on file:    Telephone Information:   Mobile [phone]       Call taken on 4/4/2024 at 7:43 AM by Jory Huang

## 2024-04-04 NOTE — PROGRESS NOTES
Assessment & Plan   1. Seasonal allergic rhinitis due to pollen  Hx of nose bleeds with Flonase. Hx of seasonal allergies. Intermittent symptoms, more likely allergy mediated. Not using afrin, so not due to rebounds symptoms. Trial astelin below. If not improving, could consider treatment with doxy for unresolved sinus infection. If still not improving would obtain sinus CT and refer to ENT and/or allergy. Patient and family agreeable with plan.  - azelastine (ASTELIN) 0.1 % nasal spray; Spray 1 spray into both nostrils 2 times daily  Dispense: 30 mL; Refill: 1        René Appiah MD  [facility]    Disclaimer: This note consists of symbols derived from keyboarding, dictation and/or voice recognition software. As a result, there may be errors in the script that have gone undetected. Please consider this when interpreting information found in this chart.    Celso Lake is a 74 year old, presenting for the following health issues:  Sinus Problem and Hospital F/U        4/4/2024     9:58 AM   Additional Questions   Roomed by Judy BALDERAS   Accompanied by None         4/4/2024     9:58 AM   Patient Reported Additional Medications   Patient reports taking the following new medications NA     History of Present Illness       Reason for visit:  Continued headaches congestion in sinuses off and on    She eats 2-3 servings of fruits and vegetables daily.She consumes 1 sweetened beverage(s) daily.She exercises with enough effort to increase her heart rate 20 to 29 minutes per day.  She exercises with enough effort to increase her heart rate 4 days per week.   She is taking medications regularly.    ED/UC Followup:    Facility:  [facility]  Date of visit: 3/14/24  Reason for visit: cough   Current Status: Worsening     Seen in ER 3/14/24, cough resolved.    Treated for a sinus infection by PCP with Augmentin on 3/19/24 which helped with purulent discharge. Not  "currently using afrin. Not using Flonase because of history of nose bleeds. Using nasal saline rinses.    Nasal congestion and headaches frontal and maxillary L>R. in the evening and over night. Tylenol not helpful for headaches.    History of dust mite and seasonal allergies.    No vision changes, hearing changes, itchy eyes.    No dental pain/tooth pain. Saw dentist in January.    Review of Systems  Constitutional, HEENT, cardiovascular, pulmonary, GI, , musculoskeletal, neuro, skin, endocrine and psych systems are negative, except as otherwise noted.      Objective    /80   Pulse 72   Temp 98.7  F (37.1  C) (Temporal)   Resp 20   Ht 1.61 m (5' 3.39\")   Wt 68.9 kg (152 lb)   SpO2 95%   BMI 26.60 kg/m    Body mass index is 26.6 kg/m .  Physical Exam  Vitals reviewed.   HENT:      Head: Normocephalic and atraumatic.      Right Ear: Tympanic membrane, ear canal and external ear normal. No middle ear effusion. There is no impacted cerumen. Tympanic membrane is not injected, scarred, perforated or erythematous.      Left Ear: Tympanic membrane, ear canal and external ear normal.  No middle ear effusion. There is no impacted cerumen. Tympanic membrane is not injected, scarred, perforated or erythematous.      Nose: No congestion or rhinorrhea.      Right Nostril: No epistaxis.      Left Nostril: No epistaxis.      Mouth/Throat:      Lips: Pink.      Mouth: Mucous membranes are moist. No oral lesions.      Tongue: No lesions.      Palate: No mass and lesions.      Pharynx: Oropharynx is clear. No pharyngeal swelling, oropharyngeal exudate or posterior oropharyngeal erythema.   Eyes:      General:         Right eye: No discharge.         Left eye: No discharge.      Extraocular Movements: Extraocular movements intact.      Conjunctiva/sclera: Conjunctivae normal.      Pupils: Pupils are equal, round, and reactive to light.   Cardiovascular:      Rate and Rhythm: Normal rate and regular rhythm.      Heart " sounds: Normal heart sounds. No murmur heard.     No friction rub.   Pulmonary:      Effort: Pulmonary effort is normal. No respiratory distress.      Breath sounds: Normal breath sounds. No wheezing or rhonchi.   Musculoskeletal:         General: No swelling.      Cervical back: Normal range of motion and neck supple. No tenderness.   Lymphadenopathy:      Cervical: No cervical adenopathy.   Skin:     General: Skin is warm.      Findings: No rash.   Neurological:      General: No focal deficit present.      Mental Status: She is alert.      Motor: No weakness.      Coordination: Coordination normal.      Gait: Gait normal.   Psychiatric:         Mood and Affect: Mood normal.         Behavior: Behavior normal.         Thought Content: Thought content normal.         Judgment: Judgment normal.      Labs: None

## 2024-04-04 NOTE — TELEPHONE ENCOUNTER
Reviewed chart and noted patient already had appt today with another provider [facility] at [facility] [place] to discuss symptoms.    Called patient to ensure all questions were answered or if there's anything else she needs from our office - patient states [place] provider was very helpful, thinks she has a good plan in place. No further questions or concerns for PCP office. Will close encounter.      LUMA Bello, RN  [facility]

## 2024-04-11 ENCOUNTER — ANCILLARY PROCEDURE (OUTPATIENT)
Dept: MAMMOGRAPHY | Facility: CLINIC | Age: 74
End: 2024-04-11
Attending: NURSE PRACTITIONER
Payer: COMMERCIAL

## 2024-04-11 DIAGNOSIS — R92.8 ABNORMAL MAMMOGRAM: ICD-10-CM

## 2024-04-11 PROCEDURE — 77065 DX MAMMO INCL CAD UNI: CPT | Mod: LT

## 2024-04-11 PROCEDURE — G0279 TOMOSYNTHESIS, MAMMO: HCPCS

## 2024-04-15 ENCOUNTER — TELEPHONE (OUTPATIENT)
Dept: FAMILY MEDICINE | Facility: CLINIC | Age: 74
End: 2024-04-15
Payer: COMMERCIAL

## 2024-04-15 DIAGNOSIS — M81.0 OSTEOPOROSIS WITHOUT CURRENT PATHOLOGICAL FRACTURE, UNSPECIFIED OSTEOPOROSIS TYPE: Primary | ICD-10-CM

## 2024-04-15 NOTE — RESULT ENCOUNTER NOTE
Please calll Aleksandra Michel,    Attached are your test results.  Your Dexa Bone Density test showed:    The bone density shows some  Osteoporosis.  You should be taking 3686-2524 mg of calcium with vit D.  You should be exercising regularly.  Would like to consider starting on Fosamax to help decrease this   Would recheck bone density in 2 years     Stephany Hannon, CNP

## 2024-04-15 NOTE — TELEPHONE ENCOUNTER
"This writer attempted to contact patient on 04/15/24.    Reason for call provider's message and left message to call clinic back at [phone].    If patient calls back:   Please relay message below.    FREEMAN Barnes  [facility]    \"----- Message from VARSHA Perkins CNP sent at 4/15/2024 12:53 PM CDT -----  Please calll Aleksandra Michel,    Attached are your test results.  Your Dexa Bone Density test showed:    The bone density shows some  Osteoporosis.  You should be taking 1776-1434 mg of calcium with vit D.  You should be exercising regularly.  Would like to consider starting on Fosamax to help decrease this   Would recheck bone density in 2 years     Stephany Hannon CNP\"      "

## 2024-04-15 NOTE — TELEPHONE ENCOUNTER
Patient called back.  Relayed provider's message as written.  Patient stated she won't do the Fosamax as it causes extreme nosebleeds in her.      Routing to provider to review.    Kristina Kjellberg, MSN, RN  [facility] Triage

## 2024-04-17 RX ORDER — RISEDRONATE SODIUM 35 MG/1
35 TABLET, FILM COATED ORAL
Qty: 12 TABLET | Refills: 3 | Status: SHIPPED | OUTPATIENT
Start: 2024-04-17

## 2024-04-17 NOTE — TELEPHONE ENCOUNTER
Called and spoke with patient. Notified her of provider's response below. Patient reported that she would like to research medication. Will give the clinic a call back once she looks into it and will let provider know her thoughts after looking into this.     Patient had no further questions/concerns at this time.     KATYA FernandoN, RN   [facility]

## 2024-04-17 NOTE — TELEPHONE ENCOUNTER
Would she be willing to try Actonel? Same category but different medication?  Stephany Hannon NP, APRN CNP

## 2024-04-17 NOTE — TELEPHONE ENCOUNTER
"Patient is returning a call.    Patient would like to try Actonel.  \"As long as it does not give me sinus issues.\"    Please send to pharmacy attached.    Lab scheduled to recheck TSH.    Fanny Palma RN, BSN  [facility]    "

## 2024-04-19 ENCOUNTER — LAB (OUTPATIENT)
Dept: LAB | Facility: CLINIC | Age: 74
End: 2024-04-19
Payer: COMMERCIAL

## 2024-04-19 DIAGNOSIS — R94.6 ABNORMAL FINDING ON THYROID FUNCTION TEST: ICD-10-CM

## 2024-04-19 DIAGNOSIS — R79.89 LOW SERUM CALCIUM: ICD-10-CM

## 2024-04-19 PROCEDURE — 80048 BASIC METABOLIC PNL TOTAL CA: CPT

## 2024-04-19 PROCEDURE — 84443 ASSAY THYROID STIM HORMONE: CPT

## 2024-04-19 PROCEDURE — 36415 COLL VENOUS BLD VENIPUNCTURE: CPT

## 2024-04-19 NOTE — LETTER
April 22, 2024      Aleksandra Michel  [address]   [place] [address]        Dear ,    We are writing to inform you of your test results.      Resulted Orders   Basic metabolic panel   Result Value Ref Range    Sodium 139 135 - 145 mmol/L      Comment:      Reference intervals for this test were updated on 09/26/2023 to more accurately reflect our healthy population. There may be differences in the flagging of prior results with similar values performed with this method. Interpretation of those prior results can be made in the context of the updated reference intervals.     Potassium 4.4 3.4 - 5.3 mmol/L    Chloride 105 98 - 107 mmol/L    Carbon Dioxide (CO2) 23 22 - 29 mmol/L    Anion Gap 11 7 - 15 mmol/L    Urea Nitrogen 13.9 8.0 - 23.0 mg/dL    Creatinine 0.79 0.51 - 0.95 mg/dL    GFR Estimate 77 >60 mL/min/1.73m2    Calcium 9.4 8.8 - 10.2 mg/dL    Glucose 117 (H) 70 - 99 mg/dL   TSH with free T4 reflex   Result Value Ref Range    TSH 2.39 0.30 - 4.20 uIU/mL       If you have any questions or concerns, please call the clinic at the number listed above.       Sincerely,      VARSHA Perkins CNP

## 2024-04-21 LAB
ANION GAP SERPL CALCULATED.3IONS-SCNC: 11 MMOL/L (ref 7–15)
BUN SERPL-MCNC: 13.9 MG/DL (ref 8–23)
CALCIUM SERPL-MCNC: 9.4 MG/DL (ref 8.8–10.2)
CHLORIDE SERPL-SCNC: 105 MMOL/L (ref 98–107)
CREAT SERPL-MCNC: 0.79 MG/DL (ref 0.51–0.95)
DEPRECATED HCO3 PLAS-SCNC: 23 MMOL/L (ref 22–29)
EGFRCR SERPLBLD CKD-EPI 2021: 77 ML/MIN/1.73M2
GLUCOSE SERPL-MCNC: 117 MG/DL (ref 70–99)
POTASSIUM SERPL-SCNC: 4.4 MMOL/L (ref 3.4–5.3)
SODIUM SERPL-SCNC: 139 MMOL/L (ref 135–145)
TSH SERPL DL<=0.005 MIU/L-ACNC: 2.39 UIU/ML (ref 0.3–4.2)

## 2024-04-22 ENCOUNTER — TELEPHONE (OUTPATIENT)
Dept: FAMILY MEDICINE | Facility: CLINIC | Age: 74
End: 2024-04-22
Payer: COMMERCIAL

## 2024-04-22 NOTE — RESULT ENCOUNTER NOTE
Please call  Aleksandra Michel,    Attached are your test results.  -Kidney function (GFR) is normal.  -Sodium is normal.  -Potassium is normal.  -Calcium is normal.  -Glucose is mildly elevated but you were nonfasting and this is okay..  -TSH (thyroid stimulating hormone) level is normal which indicates normal thyroid function.   Please contact us if you have any questions.    Stephany Hannon, CNP

## 2024-04-22 NOTE — TELEPHONE ENCOUNTER
----- Message from Kristina M Kjellberg, RN sent at 4/22/2024  8:16 AM CDT -----  Patient notified of provider message as written.  Patient verbalized understanding.    Patient would like these results sent to her via letter.  Routing to 's to assist with letter.      Kristina Kjellberg, MSN, RN

## 2024-10-11 ENCOUNTER — OFFICE VISIT (OUTPATIENT)
Dept: FAMILY MEDICINE | Facility: CLINIC | Age: 74
End: 2024-10-11
Payer: COMMERCIAL

## 2024-10-11 ENCOUNTER — ANCILLARY PROCEDURE (OUTPATIENT)
Dept: GENERAL RADIOLOGY | Facility: CLINIC | Age: 74
End: 2024-10-11
Attending: FAMILY MEDICINE
Payer: COMMERCIAL

## 2024-10-11 VITALS
OXYGEN SATURATION: 97 % | SYSTOLIC BLOOD PRESSURE: 112 MMHG | WEIGHT: 147.31 LBS | TEMPERATURE: 98.2 F | HEART RATE: 72 BPM | HEIGHT: 63 IN | RESPIRATION RATE: 18 BRPM | BODY MASS INDEX: 26.1 KG/M2 | DIASTOLIC BLOOD PRESSURE: 68 MMHG

## 2024-10-11 DIAGNOSIS — M25.551 BILATERAL HIP PAIN: Primary | ICD-10-CM

## 2024-10-11 DIAGNOSIS — L65.9 HAIR LOSS: ICD-10-CM

## 2024-10-11 DIAGNOSIS — R29.898 LEFT LEG WEAKNESS: ICD-10-CM

## 2024-10-11 DIAGNOSIS — K90.9 INTESTINAL MALABSORPTION, UNSPECIFIED TYPE: ICD-10-CM

## 2024-10-11 DIAGNOSIS — M25.552 BILATERAL HIP PAIN: ICD-10-CM

## 2024-10-11 DIAGNOSIS — L60.8 CHANGE IN NAIL APPEARANCE: ICD-10-CM

## 2024-10-11 DIAGNOSIS — Z79.890 HORMONE REPLACEMENT THERAPY: ICD-10-CM

## 2024-10-11 DIAGNOSIS — M25.551 BILATERAL HIP PAIN: ICD-10-CM

## 2024-10-11 DIAGNOSIS — M25.552 BILATERAL HIP PAIN: Primary | ICD-10-CM

## 2024-10-11 LAB
ALBUMIN SERPL BCG-MCNC: 4 G/DL (ref 3.5–5.2)
ALP SERPL-CCNC: 71 U/L (ref 40–150)
ALT SERPL W P-5'-P-CCNC: 13 U/L (ref 0–50)
ANION GAP SERPL CALCULATED.3IONS-SCNC: 7 MMOL/L (ref 7–15)
AST SERPL W P-5'-P-CCNC: 20 U/L (ref 0–45)
BILIRUB SERPL-MCNC: 0.3 MG/DL
BUN SERPL-MCNC: 12 MG/DL (ref 8–23)
CALCIUM SERPL-MCNC: 9.2 MG/DL (ref 8.8–10.4)
CHLORIDE SERPL-SCNC: 107 MMOL/L (ref 98–107)
CREAT SERPL-MCNC: 0.91 MG/DL (ref 0.51–0.95)
EGFRCR SERPLBLD CKD-EPI 2021: 65 ML/MIN/1.73M2
ERYTHROCYTE [DISTWIDTH] IN BLOOD BY AUTOMATED COUNT: 13.2 % (ref 10–15)
FERRITIN SERPL-MCNC: 43 NG/ML (ref 11–328)
FOLATE SERPL-MCNC: 27.5 NG/ML (ref 4.6–34.8)
GLUCOSE SERPL-MCNC: 90 MG/DL (ref 70–99)
HCO3 SERPL-SCNC: 24 MMOL/L (ref 22–29)
HCT VFR BLD AUTO: 44.6 % (ref 35–47)
HGB BLD-MCNC: 15.1 G/DL (ref 11.7–15.7)
MCH RBC QN AUTO: 32.9 PG (ref 26.5–33)
MCHC RBC AUTO-ENTMCNC: 33.9 G/DL (ref 31.5–36.5)
MCV RBC AUTO: 97 FL (ref 78–100)
PLATELET # BLD AUTO: 277 10E3/UL (ref 150–450)
POTASSIUM SERPL-SCNC: 4.8 MMOL/L (ref 3.4–5.3)
PROT SERPL-MCNC: 6.6 G/DL (ref 6.4–8.3)
RBC # BLD AUTO: 4.59 10E6/UL (ref 3.8–5.2)
SODIUM SERPL-SCNC: 138 MMOL/L (ref 135–145)
T4 FREE SERPL-MCNC: 1.04 NG/DL (ref 0.9–1.7)
TSH SERPL DL<=0.005 MIU/L-ACNC: 4.82 UIU/ML (ref 0.3–4.2)
VIT B12 SERPL-MCNC: 715 PG/ML (ref 232–1245)
VIT D+METAB SERPL-MCNC: 43 NG/ML (ref 20–50)
WBC # BLD AUTO: 6.3 10E3/UL (ref 4–11)

## 2024-10-11 PROCEDURE — 85027 COMPLETE CBC AUTOMATED: CPT | Performed by: FAMILY MEDICINE

## 2024-10-11 PROCEDURE — 80053 COMPREHEN METABOLIC PANEL: CPT | Performed by: FAMILY MEDICINE

## 2024-10-11 PROCEDURE — 84439 ASSAY OF FREE THYROXINE: CPT | Performed by: FAMILY MEDICINE

## 2024-10-11 PROCEDURE — 99000 SPECIMEN HANDLING OFFICE-LAB: CPT | Performed by: FAMILY MEDICINE

## 2024-10-11 PROCEDURE — 99214 OFFICE O/P EST MOD 30 MIN: CPT | Performed by: FAMILY MEDICINE

## 2024-10-11 PROCEDURE — 82306 VITAMIN D 25 HYDROXY: CPT | Performed by: FAMILY MEDICINE

## 2024-10-11 PROCEDURE — 84630 ASSAY OF ZINC: CPT | Mod: 90 | Performed by: FAMILY MEDICINE

## 2024-10-11 PROCEDURE — 82746 ASSAY OF FOLIC ACID SERUM: CPT | Performed by: FAMILY MEDICINE

## 2024-10-11 PROCEDURE — 84443 ASSAY THYROID STIM HORMONE: CPT | Performed by: FAMILY MEDICINE

## 2024-10-11 PROCEDURE — 72100 X-RAY EXAM L-S SPINE 2/3 VWS: CPT | Performed by: RADIOLOGY

## 2024-10-11 PROCEDURE — 82607 VITAMIN B-12: CPT | Performed by: FAMILY MEDICINE

## 2024-10-11 PROCEDURE — G2211 COMPLEX E/M VISIT ADD ON: HCPCS | Performed by: FAMILY MEDICINE

## 2024-10-11 PROCEDURE — 73522 X-RAY EXAM HIPS BI 3-4 VIEWS: CPT | Performed by: RADIOLOGY

## 2024-10-11 PROCEDURE — 82728 ASSAY OF FERRITIN: CPT | Performed by: FAMILY MEDICINE

## 2024-10-11 PROCEDURE — 36415 COLL VENOUS BLD VENIPUNCTURE: CPT | Performed by: FAMILY MEDICINE

## 2024-10-11 ASSESSMENT — PAIN SCALES - GENERAL: PAINLEVEL: MODERATE PAIN (5)

## 2024-10-11 NOTE — RESULT ENCOUNTER NOTE
Candice Lake,    If you have not viewed these results on [employer] within 3 days, we will use an alternative method to contact you. We will contact you via the following protocol:    - Via letter if your results are normal.  - Via phone ([phone]) if your results are abnormal.     Here are my comments about your recent results:    TSH - Your thyroid screen with a TSH was slightly elevated, but your thyroid hormone was normal. This shouldn't be a cause for concern and is typically referred to as subclinical hypothyroisim.     Subclinical hypothyroidism is an early, mild form of hypothyroidism, a condition in which the body doesn't produce enough thyroid hormones. It's called subclinical because only the serum level of thyroid-stimulating hormone from the front of the pituitary gland is a little bit above normal. The thyroid hormones produced by the thyroid gland are still within the laboratory's normal range.     There's a lot of debate about how - and even if - to treat those with subclinical hypothyroidism. This is especially true if TSH levels are lower than 10 mIU/L.  Because a higher TSH level can start to produce adverse effects on the body, people with a TSH level over 10 mIU/L are generally treated. Evidence is mostly inconclusive that those with TSH levels between 5.1 and 10 mIU/L will benefit from treatment.    Given your symptoms we could consider low dose treatment starting at 25 mcg. Let me know your thoughts.    Vitamin B12 - Your Vitamin B12 level was normal.    Vitamin D - Your vitamin D level was normal.    Ferritin - Your iron stores were normal.    CMP Results - Your blood sugar was normal. Your kidney function, electrolytes, and liver function were normal.    Please call the clinic ([phone]), or message us on [employer] with any questions you may have.     Have a great day,    Dr. Cid

## 2024-10-11 NOTE — RESULT ENCOUNTER NOTE
Candice Lake,    If you have not viewed these results on [employer] within 3 days, we will use an alternative method to contact you. We will contact you via the following protocol:    - Via letter if your results are normal.  - Via phone ([phone]) if your results are abnormal.     Here are my comments about your recent results:    CBC Results - Your cell counts were normal.    Please call the clinic ([phone]), or message us on [employer] with any questions you may have.     Have a great day,    Dr. Cid

## 2024-10-11 NOTE — RESULT ENCOUNTER NOTE
Candice Lake,    If you have not viewed these results on [employer] within 3 days, we will use an alternative method to contact you. We will contact you via the following protocol:    - Via letter if your results are normal.  - Via phone ([phone]) if your results are abnormal.     Here are my comments about your recent results:    Your hip xrays show arthritis on both sides. I think this is a more likely cause of your groin/deep hip pain. Work with physical therapy. If need be we can have you see ortho and consider hip injections. I don't think it is severe enough symptom wise, or imaging wise to warrant a surgical procedure.    Please call the clinic ([phone]), or message us on [employer] with any questions you may have.     Have a great day,    Dr. Cid

## 2024-10-11 NOTE — PROGRESS NOTES
.  Assessment & Plan   1. Bilateral hip pain  No pain on exam today. MSK vs referred/radicular pain. Obtain Xrays and work with PT.  - XR Pelvis w 2 VW Hips Bilateral; Future  - Physical Therapy  Referral; Future    2. Left leg weakness  Subjective daily weakness, worsening throughout the day. Check Lumbar xray. Work with PT. If not improving within 4 weeks will order MRI and EMG.  - XR Lumbar Spine 2/3 Views; Future  - Physical Therapy  Referral; Future    3. Hair loss  4. Change in nail appearance  Recommend D[facility] Dryness & Itch Relief Anti-Dandruff shampoo. Discussed avoiding fragrance items that can cause irritation. Avoid mechanical/chemical/heat damage to existing hair. Decrease stress. Discussed topical gender dosed Minoxidil and side effects. Discussed other topical OTC oils/medications that have limited evidence and their side effects. Discussed oral medications that can be used. Check vitamin Deficiencies below. See AVS and specific plan outlined below.  - Vitamin B12; Future  - Vitamin D Deficiency; Future  - Zinc; Future  - Ferritin; Future  - Folate; Future  - Comprehensive metabolic panel; Future  - CBC with platelets; Future  - TSH with free T4 reflex; Future    René Appiah MD  [facility]    Disclaimer: This note consists of symbols derived from keyboarding, dictation and/or voice recognition software. As a result, there may be errors in the script that have gone undetected. Please consider this when interpreting information found in this chart.    Celso Lake is a 74 year old, presenting for the following health issues:  Thyroid Problem and Leg Pain        10/11/2024     9:48 AM   Additional Questions   Roomed by VE   Accompanied by Spouse     History of Present Illness       Reason for visit:  Leg pain and thyroid question  Symptom onset:  More than a month  Symptoms include:  Leg pain  Symptom intensity:  Moderate  Symptom  "progression:  Worsening  Had these symptoms before:  Yes  Has tried/received treatment for these symptoms:  Yes  Previous treatment was successful:  No  What makes it worse:  No  What makes it better:  No   She is taking medications regularly.         Pain History:  When did you first notice your pain? February   Have you seen anyone else for your pain? Yes - Shelbi Hannon  How has your pain affected your ability to work? Not applicable  Where in your body do you have pain? Musculoskeletal problem/pain  Onset/Duration: February  Description  Location: upper leg - left  Joint Swelling: No  Redness: No  Pain: YES  Warmth: No  Intensity:  5/10  Progression of Symptoms:  worsening  Accompanying signs and symptoms:   Fevers: No  Numbness/tingling/weakness: No  History  Trauma to the area: No  Recent illness:  No  Previous similar problem: No  Previous evaluation:  YES  Precipitating or alleviating factors:  Aggravating factors include: sitting, walking, laying flat  Therapies tried and outcome: stretching, ibuprofen and tylenol    Worse in the last 2 months. Sharp/jabbing pains in both pains. Occasional right groin and more frequent left groin radiating down the left leg.    Noticing weakness in left leg by the end of the day she has to lift her leg to get into bed.    No back pain.     Review of Systems  Constitutional, HEENT, cardiovascular, pulmonary, GI, , musculoskeletal, neuro, skin, endocrine and psych systems are negative, except as otherwise noted.      Objective    /68 (BP Location: Left arm, Patient Position: Chair, Cuff Size: Adult Regular)   Pulse 72   Temp 98.2  F (36.8  C) (Temporal)   Resp 18   Ht 1.594 m (5' 2.75\")   Wt 66.8 kg (147 lb 5 oz)   LMP  (LMP Unknown)   SpO2 97%   Breastfeeding No   BMI 26.30 kg/m    Body mass index is 26.3 kg/m .  Physical Exam  Vitals reviewed.   HENT:      Head: Normocephalic and atraumatic.   Eyes:      General:         Right eye: No discharge.         Left " eye: No discharge.      Extraocular Movements: Extraocular movements intact.      Conjunctiva/sclera: Conjunctivae normal.      Pupils: Pupils are equal, round, and reactive to light.   Cardiovascular:      Rate and Rhythm: Normal rate and regular rhythm.      Heart sounds: Normal heart sounds. No murmur heard.     No friction rub.   Pulmonary:      Effort: Pulmonary effort is normal. No respiratory distress.      Breath sounds: Normal breath sounds. No wheezing or rhonchi.   Musculoskeletal:         General: No swelling.      Cervical back: Normal range of motion and neck supple. No tenderness.      Thoracic back: No tenderness.      Lumbar back: No spasms or tenderness. Normal range of motion. Negative right straight leg raise test and negative left straight leg raise test.      Comments: Negative SIMBA test.   Lymphadenopathy:      Cervical: No cervical adenopathy.   Skin:     General: Skin is warm.      Findings: No rash.   Neurological:      General: No focal deficit present.      Mental Status: She is alert.      Motor: No weakness.      Coordination: Coordination normal.      Gait: Gait normal.   Psychiatric:         Mood and Affect: Mood normal.         Behavior: Behavior normal.         Thought Content: Thought content normal.         Judgment: Judgment normal.          Labs: Pending    Imaging: Pending        Signed Electronically by: René Appiah MD

## 2024-10-13 LAB — ZINC SERPL-MCNC: 85.2 UG/DL

## 2024-10-14 NOTE — RESULT ENCOUNTER NOTE
Candice Lake,    If you have not viewed these results on [employer] within 3 days, we will use an alternative method to contact you. We will contact you via the following protocol:    - Via letter if your results are normal.  - Via phone ([phone]) if your results are abnormal.     Here are my comments about your recent results:    Your xray showed low bone density consistent with osteoporosis. It looks like your primary (Stephany Hannon) had messaged you after your bone density scan to start on a trial of fosamax (alendranate) to help with bone density. Is this something you had done?    Otherwise, there is arthritis in the joints connecting your spine to the pelvis (SI joints).     There was no obvious fracture or place where the nerves would be getting pinched. If no improvement with PT in 4 weeks, we can consider the MRI.    Please call the clinic ([phone]), or message us on [employer] with any questions you may have.     Have a great day,    Dr. Cid

## 2024-10-15 ENCOUNTER — TELEPHONE (OUTPATIENT)
Dept: FAMILY MEDICINE | Facility: CLINIC | Age: 74
End: 2024-10-15
Payer: COMMERCIAL

## 2024-10-15 DIAGNOSIS — E03.9 ACQUIRED HYPOTHYROIDISM: Primary | ICD-10-CM

## 2024-10-15 RX ORDER — LEVOTHYROXINE SODIUM 25 UG/1
25 TABLET ORAL DAILY
Qty: 90 TABLET | Refills: 0 | Status: SHIPPED | OUTPATIENT
Start: 2024-10-15

## 2024-10-15 ASSESSMENT — ACTIVITIES OF DAILY LIVING (ADL)
DEEP_SQUATTING: SLIGHT DIFFICULTY
GETTING_INTO_AND_OUT_OF_AN_AVERAGE_CAR: SLIGHT DIFFICULTY
LOW_IMPACT_ACTIVITIES_LIKE_FAST_WALKING: SLIGHT DIFFICULTY
RECREATIONAL_ACTIVITIES: SLIGHT DIFFICULTY
SWINGING_OBJECTS_LIKE_A_GOLF_CLUB: SLIGHT DIFFICULTY
ROLLING OVER IN BED: SLIGHT DIFFICULTY
WALKING_INITIALLY: SLIGHT DIFFICULTY
SPORTS_TOTAL_ITEM_SCORE: 0
WALKING_DOWN_STEEP_HILLS: SLIGHT DIFFICULTY
GOING DOWN 1 FLIGHT OF STAIRS: SLIGHT DIFFICULTY
RECREATIONAL ACTIVITIES: SLIGHT DIFFICULTY
WALKING_DOWN_STEEP_HILLS: SLIGHT DIFFICULTY
WALKING_FOR_APPROXIMATELY_10_MINUTES: SLIGHT DIFFICULTY
WALKING_UP_STEEP_HILLS: SLIGHT DIFFICULTY
LIGHT_TO_MODERATE_WORK: SLIGHT DIFFICULTY
SPORTS_SCORE(%): 0
SPORTS_HIGHEST_POTENTIAL_SCORE: 36
ADL_COUNT: 17
STEPPING_UP_AND_DOWN_CURBS: SLIGHT DIFFICULTY
HOS_ADL_SCORE(%): 72.06
RUNNING_ONE_MILE: UNABLE TO DO
HOS_ADL_ITEM_SCORE_TOTAL: 49
JUMPING: UNABLE TO DO
GOING_UP_1_FLIGHT_OF_STAIRS: SLIGHT DIFFICULTY
PUTTING ON SOCKS AND SHOES: SLIGHT DIFFICULTY
WALKING_UP_STEEP_HILLS: SLIGHT DIFFICULTY
ADL_TOTAL_ITEM_SCORE: 0
LIGHT_TO_MODERATE_WORK: SLIGHT DIFFICULTY
GETTING_INTO_AND_OUT_OF_A_BATHTUB: SLIGHT DIFFICULTY
SPORTS_COUNT: 9
WALKING_APPROXIMATELY_10_MINUTES: SLIGHT DIFFICULTY
SITTING FOR 15 MINUTES: SLIGHT DIFFICULTY
PUTTING_ON_SOCKS_AND_SHOES: SLIGHT DIFFICULTY
SITTING_FOR_15_MINUTES: SLIGHT DIFFICULTY
LANDING: SLIGHT DIFFICULTY
PLEASE_INDICATE_YOR_PRIMARY_REASON_FOR_REFERRAL_TO_THERAPY:: HIP
TWISTING/PIVOTING ON INVOLVED LEG: SLIGHT DIFFICULTY
GOING_DOWN_1_FLIGHT_OF_STAIRS: SLIGHT DIFFICULTY
HEAVY_WORK: MODERATE DIFFICULTY
HOS_ADL_HIGHEST_POTENTIAL_SCORE: 68
HOW_WOULD_YOU_RATE_YOUR_CURRENT_LEVEL_OF_FUNCTION?: NEARLY NORMAL
CUTTING/LATERAL_MOVEMENTS: SLIGHT DIFFICULTY
ADL_HIGHEST_POTENTIAL_SCORE: 68
WALKING_15_MINUTES_OR_GREATER: SLIGHT DIFFICULTY
ADL_SCORE(%): 0
DEEP SQUATTING: SLIGHT DIFFICULTY
ABILITY_TO_PERFORM_ACTIVITY_WITH_YOUR_NORMAL_TECHNIQUE: SLIGHT DIFFICULTY
STANDING FOR 15 MINUTES: MODERATE DIFFICULTY
HEAVY_WORK: MODERATE DIFFICULTY
ROLLING_OVER_IN_BED: SLIGHT DIFFICULTY
STARTING_AND_STOPPING_QUICKLY: SLIGHT DIFFICULTY
GETTING INTO AND OUT OF AN AVERAGE CAR: SLIGHT DIFFICULTY
WALKING_INITIALLY: SLIGHT DIFFICULTY
GETTING_INTO_AND_OUT_OF_A_BATHTUB: SLIGHT DIFFICULTY
STEPPING UP AND DOWN CURBS: SLIGHT DIFFICULTY
GOING UP 1 FLIGHT OF STAIRS: SLIGHT DIFFICULTY
WALKING_15_MINUTES_OR_GREATER: SLIGHT DIFFICULTY
STANDING_FOR_15_MINUTES: MODERATE DIFFICULTY
ABILITY_TO_PARTICIPATE_IN_YOUR_DESIRED_SPORT_AS_LONG_AS_YOU_WOULD_LIKE: SLIGHT DIFFICULTY
TWISTING/PIVOTING_ON_INVOLVED_LEG: SLIGHT DIFFICULTY

## 2024-10-15 NOTE — TELEPHONE ENCOUNTER
Patient calling to say she does want to start the thyroid medication that was discussed at last visit.

## 2024-10-16 ENCOUNTER — THERAPY VISIT (OUTPATIENT)
Dept: PHYSICAL THERAPY | Facility: CLINIC | Age: 74
End: 2024-10-16
Attending: FAMILY MEDICINE
Payer: COMMERCIAL

## 2024-10-16 DIAGNOSIS — M25.551 BILATERAL HIP PAIN: ICD-10-CM

## 2024-10-16 DIAGNOSIS — R29.898 LEFT LEG WEAKNESS: ICD-10-CM

## 2024-10-16 DIAGNOSIS — M25.552 BILATERAL HIP PAIN: ICD-10-CM

## 2024-10-16 PROCEDURE — 97110 THERAPEUTIC EXERCISES: CPT | Mod: GP | Performed by: PHYSICAL THERAPIST

## 2024-10-16 PROCEDURE — 97161 PT EVAL LOW COMPLEX 20 MIN: CPT | Mod: GP | Performed by: PHYSICAL THERAPIST

## 2024-10-17 PROBLEM — M25.551 BILATERAL HIP PAIN: Status: ACTIVE | Noted: 2024-10-17

## 2024-10-17 PROBLEM — M25.552 BILATERAL HIP PAIN: Status: ACTIVE | Noted: 2024-10-17

## 2024-10-17 PROBLEM — R29.898 LEFT LEG WEAKNESS: Status: ACTIVE | Noted: 2024-10-17

## 2024-10-18 NOTE — PROGRESS NOTES
PHYSICAL THERAPY EVALUATION  Type of Visit: Evaluation              Subjective       Presenting condition or subjective complaint: Hip snd leg pain  Date of onset: 02/01/24 (on or about)    Relevant medical history:     Dates & types of surgery: Wrist. And. Hysterectomy    Prior diagnostic imaging/testing results: X-ray     Prior therapy history for the same diagnosis, illness or injury: No        Living Environment  Social support: With a significant other or spouse   Type of home: Apartment/condo   Stairs to enter the home: No       Ramp: No   Stairs inside the home: No       Help at home:    Equipment owned: Raised toilet seat; Lift chair     Employment: No    Hobbies/Interests: Travel    Patient goals for therapy:  able to walk without pain.    Pain assessment: Location: right groin and left leg pain/Rating: range 0-8     Objective   LUMBAR SPINE EVALUATION  PAIN: Pain Level at Rest: 0/10  Pain Level with Use: 8/10  Pain Location: lumbar spine, hip, and right groin  Pain Quality: Sharp, Shooting, and Stabbing  Pain Frequency: intermittent  Pain is Exacerbated By: walking  POSTURE: Standing Posture: Rounded shoulders, Forward head, Lordosis decreased, Thoracic kyphosis increased  GAIT:   Weightbearing Status: WBAT  Assistive Device(s): None  Gait Deviations:  decrease in base of support   BALANCE/PROPRIOCEPTION: Single Leg Stance Eyes Open (seconds): right 4 sec, left 10 sec.   ROM:   (Degrees) Left AROM Left PROM  Right AROM Right PROM   Hip Flexion  100  WFL   Hip Extension  Moderate tightness   tightness   Hip Abduction  Moderate tightness  WFL   Hip Adduction       Hip Internal Rotation  10 with pain   WFL   Hip External Rotation  45 degrees  WFL   Knee Flexion       Knee Extension       Lumbar Side glide     Lumbar Flexion floor   Lumbar Extension moderate     STRENGTH:  hip abduction left 4/5, right 5/5.    MYOTOMES:    Left Right   T12-L3 (Hip Flexion) 4+, + (mild) 5   L2-4 (Quads)  5 5   L4 (Ankle DF) 5 5    L5 (Great Toe Ext) 5 4   S1 (Toe Raise)       NEURAL TENSION:  slump test bilateral  FLEXIBILITY:  tightness in hip flexors left greater than right, quadriceps and lumbar paraspinals   LUMBAR/HIP Special Tests:  NA    PALPATION:  lumbar spine, left hip flexors  SPINAL SEGMENTAL CONCLUSIONS:  hypomobility  L1-L5      Assessment & Plan   CLINICAL IMPRESSIONS  Medical Diagnosis: bilateral hip and back pain    Treatment Diagnosis: bilateral hip and back pain   Impression/Assessment: Patient is a 74 year old female with left hip, right groin and scaiatic pain  complaints.  The following significant findings have been identified: Pain, Decreased ROM/flexibility, Decreased joint mobility, Decreased strength, Impaired balance, Impaired gait, and Impaired posture. These impairments interfere with their ability to perform self care tasks, household chores, driving , and walking  as compared to previous level of function.     Clinical Decision Making (Complexity):  Clinical Presentation: Stable/Uncomplicated  Clinical Presentation Rationale: based on medical and personal factors listed in PT evaluation  Clinical Decision Making (Complexity): Low complexity    PLAN OF CARE  Treatment Interventions:  Interventions: Manual Therapy, Neuromuscular Re-education, Therapeutic Activity, Therapeutic Exercise    Long Term Goals     PT Goal 1  Goal Identifier: walking  Goal Description: able to walk 1 hour without pain  Rationale: to maximize safety and independence with performance of ADLs and functional tasks;to maximize safety and independence within the home;to maximize safety and independence within the community;to maximize safety and independence with transportation;to maximize safety and independence with self cares  Goal Progress: walking 20 minutes with occasional pain  Target Date: 01/18/25      Frequency of Treatment: 1x/ week then to every other week  Duration of Treatment: 12 weeks    Recommended Referrals to Other  Professionals:  none  Education Assessment:   Learner/Method: Patient;Demonstration;Pictures/Video;No Barriers to Learning    Risks and benefits of evaluation/treatment have been explained.   Patient/Family/caregiver agrees with Plan of Care.     Evaluation Time:     PT Eval, Low Complexity Minutes (93068): 15       Signing Clinician: AMILCAR [facility]                                                                                   OUTPATIENT PHYSICAL THERAPY      PLAN OF TREATMENT FOR OUTPATIENT REHABILITATION   Patient's Last Name, First Name, Aleksandra Loredo YOB: 1950   Provider's Name   [facility]   Medical Record No.  [id number]     Onset Date: 02/01/24 (on or about)  Start of Care Date: 10/16/24     Medical Diagnosis:  bilateral hip and back pain      PT Treatment Diagnosis:  bilateral hip and back pain Plan of Treatment  Frequency/Duration: 1x/ week then to every other week/ 12 weeks    Certification date from 10/16/24 to 01/08/25         See note for plan of treatment details and functional goals     Sandra Us, PT                         I CERTIFY THE NEED FOR THESE SERVICES FURNISHED UNDER        THIS PLAN OF TREATMENT AND WHILE UNDER MY CARE     (Physician attestation of this document indicates review and certification of the therapy plan).              Referring Provider:  René Appiah    Initial Assessment  See Epic Evaluation- Start of Care Date: 10/16/24

## 2024-10-24 ENCOUNTER — THERAPY VISIT (OUTPATIENT)
Dept: PHYSICAL THERAPY | Facility: CLINIC | Age: 74
End: 2024-10-24
Payer: COMMERCIAL

## 2024-10-24 DIAGNOSIS — R29.898 LEFT LEG WEAKNESS: Primary | ICD-10-CM

## 2024-10-24 DIAGNOSIS — M25.552 BILATERAL HIP PAIN: ICD-10-CM

## 2024-10-24 DIAGNOSIS — M25.551 BILATERAL HIP PAIN: ICD-10-CM

## 2024-10-24 PROCEDURE — 97110 THERAPEUTIC EXERCISES: CPT | Mod: GP | Performed by: PHYSICAL THERAPIST

## 2024-10-24 PROCEDURE — 97140 MANUAL THERAPY 1/> REGIONS: CPT | Mod: GP | Performed by: PHYSICAL THERAPIST

## 2024-11-15 ENCOUNTER — THERAPY VISIT (OUTPATIENT)
Dept: PHYSICAL THERAPY | Facility: CLINIC | Age: 74
End: 2024-11-15
Payer: COMMERCIAL

## 2024-11-15 DIAGNOSIS — M25.551 BILATERAL HIP PAIN: ICD-10-CM

## 2024-11-15 DIAGNOSIS — R29.898 LEFT LEG WEAKNESS: Primary | ICD-10-CM

## 2024-11-15 DIAGNOSIS — M25.552 BILATERAL HIP PAIN: ICD-10-CM

## 2024-11-15 PROCEDURE — 97110 THERAPEUTIC EXERCISES: CPT | Mod: GP | Performed by: PHYSICAL THERAPIST

## 2024-11-15 PROCEDURE — 97140 MANUAL THERAPY 1/> REGIONS: CPT | Mod: GP | Performed by: PHYSICAL THERAPIST

## 2024-12-18 ENCOUNTER — THERAPY VISIT (OUTPATIENT)
Dept: PHYSICAL THERAPY | Facility: CLINIC | Age: 74
End: 2024-12-18
Payer: COMMERCIAL

## 2024-12-18 DIAGNOSIS — M25.552 BILATERAL HIP PAIN: ICD-10-CM

## 2024-12-18 DIAGNOSIS — R29.898 LEFT LEG WEAKNESS: Primary | ICD-10-CM

## 2024-12-18 DIAGNOSIS — M25.551 BILATERAL HIP PAIN: ICD-10-CM

## 2024-12-18 PROCEDURE — 97110 THERAPEUTIC EXERCISES: CPT | Mod: GP | Performed by: PHYSICAL THERAPIST

## 2024-12-18 PROCEDURE — 97140 MANUAL THERAPY 1/> REGIONS: CPT | Mod: GP | Performed by: PHYSICAL THERAPIST

## 2024-12-18 PROCEDURE — 97164 PT RE-EVAL EST PLAN CARE: CPT | Mod: GP | Performed by: PHYSICAL THERAPIST

## 2024-12-19 NOTE — PROGRESS NOTES
12/18/24 0500   Appointment Info   Signing clinician's name / credentials [email or web address] wilson PT   Total/Authorized Visits 10 eval and treat   Visits Used 4   Medical Diagnosis bilateral hip and back pain   PT Tx Diagnosis bilateral hip and back pain   Progress Note/Certification   Start of Care Date 10/16/24   Onset of illness/injury or Date of Surgery 02/01/24  (on or about)   Therapy Frequency every other week   Predicted Duration 12 weeks   Certification date from 01/09/24   Certification date to 04/03/25   Progress Note Due Date 12/18/24   Progress Note Completed Date 10/16/24   PT Goal 1   Goal Identifier walking   Goal Description able to walk 1 hour without pain   Rationale to maximize safety and independence with performance of ADLs and functional tasks;to maximize safety and independence within the home;to maximize safety and independence within the community;to maximize safety and independence with transportation;to maximize safety and independence with self cares   Goal Progress walking 30 minutes with occasional pain  (set back with more back pain and traveling)   Target Date 01/18/25   Subjective Report   Subjective Report I have had back pain at times,  I went to [facility] and got some lifts, I have had more sleepless night,  I have not exercises as much .  when I  got of bed I had pain this morning, I have no back pain right now.  I have left hip area,  I have some calf tightness.   Objective Measures   Objective Measures Objective Measure 1;Objective Measure 2;Objective Measure 3   Objective Measure 1   Objective Measure ROM lumbar and left hip   Details TROM flexion floor, extension 75%.   left hip Flexion 90, ER 45, WFL  IR 10 degrees with pain and abduction moderate limited, bilateral hip extension minimal bilateral   Objective Measure 2   Objective Measure balance   Details right 10 sec, left 15   Objective Measure 3   Objective Measure strength   Details right EHL 4/5, left hip abduction 4/5,  left hip flexion 4+/5 with pain   Treatment Interventions (PT)   Interventions Therapeutic Procedure/Exercise;Manual Therapy;Neuromuscular Re-education   Therapeutic Procedure/Exercise   Therapeutic Procedures: strength, endurance, ROM, flexibility minutes (82226) 13   Therapeutic Procedures Ther Proc 2;Ther Proc 3;Ther Proc 4;Ther Proc 5;Ther Proc 6;Ther Proc 7   Ther Proc 1 left hip ROM   Ther Proc 1 - Details supine, prone   Ther Proc 2 bike seat#4   Ther Proc 3 bridge #`1   Ther Proc 3 - Details 10 with cueing for gluteal activity minimal lift keeping back straight   Ther Proc 4 pressups   Ther Proc 4 - Details hold fro now   Ther Proc 5 other health club activity   Ther Proc 5 - Details not starting   Ther Proc 6 long arc quad   Ther Proc 6 - Details hold   Ther Proc 7 knee bends with kitchen sink   Ther Proc 7 - Details hold for now   PTRx Ther Proc 1 Supine Lumbar Hip Roll   PTRx Ther Proc 1 - Details minimal range as possible   PTRx Ther Proc 2 Bent Knee Fall Out   PTRx Ther Proc 2 - Details one at a time no pelvic movement and hip only movement   PTRx Ther Proc 3 Supine Heel Slides   PTRx Ther Proc 3 - Details 10 x working on ROM as tolerated   PTRx Ther Proc 4 #1   PTRx Ther Proc 4 - Details reset the TA control making sure control   PTRx Ther Proc 5 prone position   PTRx Ther Proc 5 - Details to help stretch out low back and hip   Skilled Intervention restarted exercises, cueing for joint movement only, no progression with pain, TA cueing   Patient Response/Progress slight increase in right ROM   Neuromuscular Re-education   Neuromuscular re-ed of mvmt, balance, coord, kinesthetic sense, posture, proprioception minutes (77562) 3   Neuro Re-ed 1 SLS   Neuro Re-ed 1 - Details 2 x 30 secc with cueing for control   Skilled Intervention continue to watch balance, cueing for TA and gluteal control   Patient Response/Progress balance improving slowly   Manual Therapy   Manual Therapy: Mobilization, MFR, MLD,  friction massage minutes (97994) 10   Manual Therapy Manual Therapy 2;Manual Therapy 3   Manual Therapy 1 STM, MFR   Manual Therapy 1 - Details hip flexors, quadriceps   Manual Therapy 2 P-A's gr II-III   Manual Therapy 2 - Details L1-L5 in prone   Skilled Intervention light manual work on hip flexors  quadriceps, lumbar spine   Eval/Assessments   Assessments PT Re-Eval   PT Eval, Re-eval Minutes (58427) 13   Education   Learner/Method Patient;Demonstration;Pictures/Video;No Barriers to Learning   Plan   Home program ptrx   Updates to plan of care restarted exercises, manual and hip ROM   Plan for next session evaluate hip versus low back   Total Session Time   Timed Code Treatment Minutes 26   Total Treatment Time (sum of timed and untimed services) 39       [facility]                                                                                   OUTPATIENT PHYSICAL THERAPY    PLAN OF TREATMENT FOR OUTPATIENT REHABILITATION   Patient's Last Name, First Name, ARLEYLeonaTATYANALeona  MariluAleksandra YOB: 1950   Provider's Name   [facility]   Medical Record No.  [id number]     Onset Date: 02/01/24 (on or about)  Start of Care Date: 10/16/24     Medical Diagnosis:  bilateral hip and back pain      PT Treatment Diagnosis:  bilateral hip and back pain Plan of Treatment  Frequency/Duration: every other week/ 12 weeks    Certification date from 01/09/24 to 04/03/25         See note for plan of treatment details and functional goals     Sandra Us, PT                         I CERTIFY THE NEED FOR THESE SERVICES FURNISHED UNDER        THIS PLAN OF TREATMENT AND WHILE UNDER MY CARE     (Physician attestation of this document indicates review and certification of the therapy plan).              Referring Provider:  René Appiah    Initial Assessment  See Epic Evaluation- Start of Care Date: 10/16/24            PLAN  Therapy restart after 1 month  break.  Progression with ROM, walking and decrease in pain.  Plan to see 1x/ week then every other week.     Beginning/End Dates of Progress Note Reporting Period:  10/16/24 to 12/18/2024    Referring Provider:  René Appiah

## 2025-01-07 ENCOUNTER — TELEPHONE (OUTPATIENT)
Dept: FAMILY MEDICINE | Facility: CLINIC | Age: 75
End: 2025-01-07
Payer: COMMERCIAL

## 2025-01-07 DIAGNOSIS — E03.9 ACQUIRED HYPOTHYROIDISM: ICD-10-CM

## 2025-01-07 NOTE — TELEPHONE ENCOUNTER
Medication Question or Refill    Contacts       Contact Date/Time Type Contact Phone/Fax    01/07/2025 12:30 PM CST Phone (Incoming) Aleksandra Michel KARLA (Self) [phone] (M)            What medication are you calling about (include dose and sig)?: levothyroxine 25 mg tab    Preferred Pharmacy:       [id number] [address] [place] [address] [address]  [facility] [address]  Phone: [phone] Fax: [phone]      Controlled Substance Agreement on file:   CSA -- Patient Level:    CSA: None found at the patient level.       Who prescribed the medication?: Martin    Do you need a refill? Yes    When did you use the medication last? today    Patient offered an appointment? Yes: 01/30/25    Do you have any questions or concerns?  Yes: will run out of meds before the appointment date, can she get a refill to cover her until the appointment?       Could we send this information to you in [facility] or would you prefer to receive a phone call?:   Patient would prefer a phone call   Okay to leave a detailed message?: Yes at Cell number on file:    Telephone Information:   Mobile [phone]

## 2025-01-08 ENCOUNTER — THERAPY VISIT (OUTPATIENT)
Dept: PHYSICAL THERAPY | Facility: CLINIC | Age: 75
End: 2025-01-08
Payer: COMMERCIAL

## 2025-01-08 DIAGNOSIS — M25.551 BILATERAL HIP PAIN: ICD-10-CM

## 2025-01-08 DIAGNOSIS — M25.552 BILATERAL HIP PAIN: ICD-10-CM

## 2025-01-08 DIAGNOSIS — R29.898 LEFT LEG WEAKNESS: Primary | ICD-10-CM

## 2025-01-08 PROCEDURE — 97140 MANUAL THERAPY 1/> REGIONS: CPT | Mod: GP | Performed by: PHYSICAL THERAPIST

## 2025-01-08 PROCEDURE — 97110 THERAPEUTIC EXERCISES: CPT | Mod: GP | Performed by: PHYSICAL THERAPIST

## 2025-01-08 RX ORDER — LEVOTHYROXINE SODIUM 25 UG/1
25 TABLET ORAL DAILY
Qty: 90 TABLET | Refills: 0 | Status: SHIPPED | OUTPATIENT
Start: 2025-01-08

## 2025-01-08 NOTE — TELEPHONE ENCOUNTER
Spoke to patient and advised her of message below. Patient verbalized understanding, no further questions.

## 2025-01-15 ENCOUNTER — THERAPY VISIT (OUTPATIENT)
Dept: PHYSICAL THERAPY | Facility: CLINIC | Age: 75
End: 2025-01-15
Payer: COMMERCIAL

## 2025-01-15 DIAGNOSIS — M25.552 BILATERAL HIP PAIN: ICD-10-CM

## 2025-01-15 DIAGNOSIS — R29.898 LEFT LEG WEAKNESS: Primary | ICD-10-CM

## 2025-01-15 DIAGNOSIS — M25.551 BILATERAL HIP PAIN: ICD-10-CM

## 2025-01-15 PROCEDURE — 97110 THERAPEUTIC EXERCISES: CPT | Mod: GP | Performed by: PHYSICAL THERAPIST

## 2025-01-15 PROCEDURE — 97112 NEUROMUSCULAR REEDUCATION: CPT | Mod: GP | Performed by: PHYSICAL THERAPIST

## 2025-01-15 PROCEDURE — 97140 MANUAL THERAPY 1/> REGIONS: CPT | Mod: GP | Performed by: PHYSICAL THERAPIST

## 2025-01-17 NOTE — TELEPHONE ENCOUNTER
Left message for patient to return call to clinic and ask to speak with RN.    Gwen Richmond RN,   [facility]     

## 2025-01-30 ENCOUNTER — PATIENT OUTREACH (OUTPATIENT)
Dept: CARE COORDINATION | Facility: CLINIC | Age: 75
End: 2025-01-30

## 2025-02-06 ENCOUNTER — OFFICE VISIT (OUTPATIENT)
Dept: FAMILY MEDICINE | Facility: CLINIC | Age: 75
End: 2025-02-06
Payer: COMMERCIAL

## 2025-02-06 VITALS
SYSTOLIC BLOOD PRESSURE: 92 MMHG | OXYGEN SATURATION: 95 % | HEART RATE: 82 BPM | TEMPERATURE: 98.3 F | RESPIRATION RATE: 19 BRPM | WEIGHT: 145 LBS | DIASTOLIC BLOOD PRESSURE: 60 MMHG | BODY MASS INDEX: 25.69 KG/M2 | HEIGHT: 63 IN

## 2025-02-06 DIAGNOSIS — E03.9 ACQUIRED HYPOTHYROIDISM: Primary | ICD-10-CM

## 2025-02-06 DIAGNOSIS — L60.8 CHANGE IN NAIL APPEARANCE: ICD-10-CM

## 2025-02-06 DIAGNOSIS — M54.59 MECHANICAL LOW BACK PAIN: ICD-10-CM

## 2025-02-06 DIAGNOSIS — L65.9 HAIR LOSS: ICD-10-CM

## 2025-02-06 LAB — TSH SERPL DL<=0.005 MIU/L-ACNC: 2.11 UIU/ML (ref 0.3–4.2)

## 2025-02-06 ASSESSMENT — ANXIETY QUESTIONNAIRES
4. TROUBLE RELAXING: NOT AT ALL
5. BEING SO RESTLESS THAT IT IS HARD TO SIT STILL: SEVERAL DAYS
7. FEELING AFRAID AS IF SOMETHING AWFUL MIGHT HAPPEN: NOT AT ALL
2. NOT BEING ABLE TO STOP OR CONTROL WORRYING: NOT AT ALL
IF YOU CHECKED OFF ANY PROBLEMS ON THIS QUESTIONNAIRE, HOW DIFFICULT HAVE THESE PROBLEMS MADE IT FOR YOU TO DO YOUR WORK, TAKE CARE OF THINGS AT HOME, OR GET ALONG WITH OTHER PEOPLE: NOT DIFFICULT AT ALL
GAD7 TOTAL SCORE: 1
6. BECOMING EASILY ANNOYED OR IRRITABLE: NOT AT ALL
3. WORRYING TOO MUCH ABOUT DIFFERENT THINGS: NOT AT ALL
1. FEELING NERVOUS, ANXIOUS, OR ON EDGE: NOT AT ALL
GAD7 TOTAL SCORE: 1

## 2025-02-06 ASSESSMENT — ENCOUNTER SYMPTOMS
LEG PAIN: 1
BACK PAIN: 1

## 2025-02-06 ASSESSMENT — PAIN SCALES - GENERAL: PAINLEVEL_OUTOF10: NO PAIN (0)

## 2025-02-06 ASSESSMENT — PATIENT HEALTH QUESTIONNAIRE - PHQ9: SUM OF ALL RESPONSES TO PHQ QUESTIONS 1-9: 1

## 2025-02-06 NOTE — RESULT ENCOUNTER NOTE
Candice Lake,    If you have not viewed these results on [employer] within 3 days, we will use an alternative method to contact you. We will contact you via the following protocol:    - Via letter if your results are normal.  - Via phone ([phone]) if your results are abnormal.     Here are my comments about your recent results:    Your thyroid test is improved. Not quite at 1. I would be ok with increasing to 50 mcg and rechecking with a lab only visit in 2 months to see if we can get some further symptom improvement, we I am happy to keep you on your current dose as well.     Let me know your thoughts.    Please call the clinic ([phone]), or message us on [facility] with any questions you may have.     Have a great day,    Dr. Cid

## 2025-02-06 NOTE — PROGRESS NOTES
Assessment & Plan   1. Acquired hypothyroidism (Primary)  Recheck labs today.  Titrate medication as necessary.  Goal TSH around 1.  - TSH with free T4 reflex; Future  - TSH with free T4 reflex    2. Hair loss  3. Change in nail appearance  Improving with condition 1.    4. Mechanical low back pain  Improving.  Continue PT.  Notify me in 4 to 8 weeks if not improving for further advanced imaging.      René Appiah MD  [facility]    Disclaimer: This note consists of symbols derived from keyboarding, dictation and/or voice recognition software. As a result, there may be errors in the script that have gone undetected. Please consider this when interpreting information found in this chart.    The longitudinal plan of care for the diagnosis(es)/condition(s) as documented were addressed during this visit. Due to the added complexity in care, I will continue to support Aleksandra in the subsequent management and with ongoing continuity of care.    Celso Lake is a 74 year old, presenting for the following health issues:  Recheck Medication, Thyroid Problem, Leg Pain, and Back Pain        2/6/2025     9:02 AM   Additional Questions   Roomed by AD   Accompanied by Spouse     History of Present Illness       Hypothyroidism:     Since last visit, patient describes the following symptoms::  Fatigue    Reason for visit:  Leg/back pain    thyroid meds    She eats 2-3 servings of fruits and vegetables daily.She consumes 0 sweetened beverage(s) daily.She exercises with enough effort to increase her heart rate 20 to 29 minutes per day.  She exercises with enough effort to increase her heart rate 7 days per week.   She is taking medications regularly.     Patient reports starting her 25 mcg of levothyroxine without significant side effects.  She has noticed an improvement in her nail and skin changes.  She has not noticed a significant improvement in her fatigue.  She has been working on  "increasing her activity to walking 2 to 3 miles daily.  She has been wearing heel inserts recommended by her physical therapist as well as doing Achilles stretches and other exercise.  She reports that unfortunately her PT has been relatively infrequent due to booking changes and the holidays.  She overall notes improvement in her low back pain.  She is comfortable holding off on advanced imaging at this time.        12/30/2016    11:57 AM 11/13/2018     2:15 PM 2/6/2025     9:59 AM   PHQ-9 SCORE   PHQ-9 Total Score 6 2 1           12/30/2016    11:57 AM 11/13/2018     2:15 PM 2/6/2025     9:59 AM   TAYLOR-7 SCORE   Total Score 6 2 1           2/6/2025     9:08 AM   PEG Score   PEG Total Score 5       PDMP Review         Value Time User    State PDMP site checked  Yes 8/18/2021  7:30 AM Nora Cortes NP          Last CSA Agreement:   CSA -- Patient Level:    CSA: None found at the patient level.         Review of Systems  Constitutional, HEENT, cardiovascular, pulmonary, gi and gu systems are negative, except as otherwise noted.      Objective    BP 92/60 (BP Location: Left arm, Patient Position: Sitting, Cuff Size: Adult Regular)   Pulse 82   Temp 98.3  F (36.8  C) (Temporal)   Resp 19   Ht 1.59 m (5' 2.6\")   Wt 65.8 kg (145 lb)   LMP  (LMP Unknown)   SpO2 95%   BMI 26.02 kg/m    Body mass index is 26.02 kg/m .  Physical Exam  Vitals reviewed.   HENT:      Head: Normocephalic and atraumatic.   Eyes:      General:         Right eye: No discharge.         Left eye: No discharge.      Extraocular Movements: Extraocular movements intact.      Conjunctiva/sclera: Conjunctivae normal.      Pupils: Pupils are equal, round, and reactive to light.   Cardiovascular:      Rate and Rhythm: Normal rate and regular rhythm.      Heart sounds: Normal heart sounds. No murmur heard.     No friction rub.   Pulmonary:      Effort: Pulmonary effort is normal. No respiratory distress.      Breath sounds: Normal breath sounds. No " wheezing or rhonchi.   Musculoskeletal:         General: No swelling.      Cervical back: Normal range of motion and neck supple. No tenderness.   Lymphadenopathy:      Cervical: No cervical adenopathy.   Skin:     General: Skin is warm.      Findings: No rash.   Neurological:      General: No focal deficit present.      Mental Status: She is alert.      Motor: No weakness.      Coordination: Coordination normal.      Gait: Gait normal.   Psychiatric:         Mood and Affect: Mood normal.         Behavior: Behavior normal.         Thought Content: Thought content normal.         Judgment: Judgment normal.            Labs: Pending        Signed Electronically by: René Appiah MD     Detail Level: Detailed

## 2025-02-07 NOTE — RESULT ENCOUNTER NOTE
Candice Lake,    If you have not viewed these results on [employer] within 3 days, we will use an alternative method to contact you. We will contact you via the following protocol:    - Via letter if your results are normal.  - Via phone ([phone]) if your results are abnormal.     Here are my comments about your recent results:    Zinc and folate levels were normal.    Please call the clinic ([phone]), or message us on [employer] with any questions you may have.     Have a great day,    Dr. Cid No changes    History of present illness:    Status post colonoscopy 11/5/2024 with colon polyp removed, polyp in the anus removed.  Biopsy with sessile serrated lesion in the transverse colon, anal polyp with condyloma.     She comes in for follow-up.  No history of abnormal Pap smear.  Her Pap smear is up-to-date.  No testing for HIV in the past.  No blood thinner use.  No chest pain.        History:  Medical History        Past Medical History:   Diagnosis Date    ADD (attention deficit disorder)      Anxiety      Depression              Surgical History         Past Surgical History:   Procedure Laterality Date    COLONOSCOPY N/A 11/5/2024     Procedure: COLONOSCOPY W/ HOT& COLD SNARE POLYPECTOMIES;  Surgeon: Fermín Yo MD;  Location: [facility] ENDOSCOPY;  Service: General;  Laterality: N/A;  COLON POLYP, DIVERTICULOSIS, ANAL POLYP    DILATATION AND CURETTAGE   2009    KNEE ACL RECONSTRUCTION   2011    THYROID SURGERY                      Family History   Problem Relation Age of Onset    Stroke Father      Lung cancer Father      Colon cancer Maternal Grandmother      Diabetes Maternal Grandmother      Lung cancer Paternal Grandfather      Breast cancer Other           Social History   Social History            Tobacco Use    Smoking status: Former       Types: Cigarettes    Smokeless tobacco: Never   Vaping Use    Vaping status: Never Used   Substance Use Topics    Alcohol use: Never    Drug use: Never            Review of Systems  All systems were reviewed and negative except for:   Review of Systems   Constitutional: Negative for chills, fever and unexpected weight loss.   HENT: Negative for congestion, nosebleeds and voice change.    Eyes: Negative for blurred vision, double vision and discharge.   Respiratory: Negative for apnea, chest tightness and shortness of breath.    Cardiovascular: Negative for chest pain and leg swelling.   Gastrointestinal:        See HPI   Endocrine: Negative for cold  intolerance and heat intolerance.   Genitourinary: Negative for dysuria, hematuria and urgency.   Musculoskeletal: Negative for back pain, joint swelling and neck pain.   Skin: Negative for color change and dry skin.   Neurological: Negative for dizziness and confusion.   Hematological: Negative for adenopathy.   Psychiatric/Behavioral: Negative for agitation and behavioral problems.      MEDS:          Prior to Admission medications    Medication Sig Start Date End Date Taking? Authorizing Provider   clindamycin (CLEOCIN T) 1 % lotion   9/25/23   Yes Mayi Henderson MD   Dupilumab (Dupixent) 300 MG/2ML solution prefilled syringe 2 mL.     Yes Provider, MD Mayi   escitalopram (Lexapro) 20 MG tablet Take 1/2 tab 10mg q day for 2 weeks then 1 tab 20mg q day 10/1/24   Yes Vanessa Montalvo APRN   lisinopril-hydrochlorothiazide (Zestoretic) 10-12.5 MG per tablet Take 1 tablet by mouth 2 (Two) Times a Day. 10/1/24   Yes Vanessa Montalvo APRN   meclizine (ANTIVERT) 25 MG tablet Take 2 tablets by mouth 3 (Three) Times a Day As Needed for Dizziness. 9/27/24   Yes Alfie Caldwell PA-C   tacrolimus (PROTOPIC) 0.1 % ointment   9/22/23   Yes ProviderMayi MD         Allergies:  Latex        Objective  Vital Signs      Physical Exam:                General Appearance:    Alert, cooperative, in no acute distress   Head:    Normocephalic, without obvious abnormality, atraumatic   Eyes:          Conjunctivae and sclerae normal, no icterus,      Ears:    Ears appear intact with no abnormalities noted   Throat:   No oral lesions, no thrush, oral mucosa moist   Neck:   No adenopathy, supple, trachea midline, no thyromegaly   Back:     No kyphosis present, no scoliosis present, no skin lesions,      erythema or scars, no tenderness to percussion or                   palpation,   range of motion normal   Lungs:     Clear to auscultation,respirations regular, even and                  unlabored    Heart:    Regular  "rhythm and normal rate, normal S1 and S2, no            murmur, no gallop, no rub, no click   Chest Wall:    No abnormalities observed   Abdomen:     Normal bowel sounds, no masses, no organomegaly, soft        non-tender, non-distended, no guarding, no rebound                tenderness   Rectal:        Extremities:   Moves all extremities well, no edema, no cyanosis, no             redness   Pulses:   Pulses palpable and equal bilaterally   Skin:   No bleeding, bruising or rash   Lymph nodes:   No palpable adenopathy   Neurologic:   A/o x 4 with no deficits         Results Review:              {Results Review:75485::\"I reviewed the patient's new clinical results.\"     LABS/IMAGING:         Results for orders placed or performed during the hospital encounter of 11/05/24   Pregnancy, Urine - Urine, Clean Catch     Collection Time: 11/05/24 12:32 PM     Specimen: Urine, Clean Catch   Result Value Ref Range     HCG, Urine QL Negative Negative   Tissue Pathology Exam     Collection Time: 11/05/24  1:16 PM     Specimen: A: Large Intestine, Transverse Colon; Polyp     B: Anus; Polyp   Result Value Ref Range     Case Report           Surgical Pathology Report                         Case: [id number]                                  Authorizing Provider:  Fermín Yo MD  Collected:           11/05/2024 01:16 PM          Ordering Location:     [facility] Received:            11/06/2024 08:43 AM                                 SUITES                                                                       Pathologist:           Flor Edwards DO                                                       Specimens:   1) - Large Intestine, Transverse Colon, transverse colon polyp                                      2) - Anus, ANAL POLYP                                                                        Clinical Information           Screening for malignant neoplasm of colon        Final Diagnosis    " "       1. Transverse colon polyp, biopsy:               - Fragments of sessile serrated lesion        2.  Anal polyp, excision:               - Squamous mucosa with focal features suggestive of condyloma        Gross Description           1. Large Intestine, Transverse Colon.  Received in formalin and labeled \" transverse colon polyp\" is a 0.7 cm aggregate of tan soft tissue fragments. The specimen is entirely submitted in one cassette.     2. Anus.  Received in formalin and labeled \" anal polyp\" are two fragments of tan soft tissue measuring 0.2-0.3 cm in greatest dimension. The specimen is entirely submitted in one cassette.   CHAYA        Microscopic Description           Microscopic examination performed.               Result Review  : Labs  Result Review  Imaging  Med Tab  Media         Assessment & Plan  Status post colonoscopy 11/5/2024 with colon polyp removed, polyp in the anus removed.  Biopsy with sessile serrated lesion in the transverse colon, anal polyp with condyloma.     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  Procedure and recovery discussed.  Benefits and alternatives discussed.  Risk procedure including risk of anesthesia, bleeding, infection, recurrence, pain, heart attack, stroke, blood clot, pneumonia discussed.  All questions answered.  She agrees with the plan  "

## 2025-02-11 DIAGNOSIS — E03.9 ACQUIRED HYPOTHYROIDISM: Primary | ICD-10-CM

## 2025-02-11 NOTE — TELEPHONE ENCOUNTER
Candice Lake,     If you have not viewed these results on [employer] within 3 days, we will use an alternative method to contact you. We will contact you via the following protocol:    - Via letter if your results are normal.  - Via phone ([phone]) if your results are abnormal.     Here are my comments about your recent results:     Your thyroid test is improved. Not quite at 1. I would be ok with increasing to 50 mcg and rechecking with a lab only visit in 2 months to see if we can get some further symptom improvement, we I am happy to keep you on your current dose as well.     Let me know your thoughts.     Please call the clinic ([phone]), or message us on [facility] with any questions you may have.     Have a great day,     Dr. Cid

## 2025-02-11 NOTE — TELEPHONE ENCOUNTER
RN called and relayed to patient.    She WOULD like to INCREASE dose to 50 mcg and lab is set for 2 months.    Appointments in Next Year      Apr 08, 2025 10:30 AM  LAB with [facility]  [facility] ([facility]) [phone]          Patient verbalized understanding and all questions answered.     Tiesha Alcocer RN  [facility] - Registered Nurse  Clinic Triage [facility]   February 11, 2025

## 2025-02-12 RX ORDER — LEVOTHYROXINE SODIUM 50 UG/1
50 TABLET ORAL DAILY
Qty: 90 TABLET | Refills: 0 | Status: SHIPPED | OUTPATIENT
Start: 2025-02-12

## 2025-02-12 RX ORDER — LEVOTHYROXINE SODIUM 25 UG/1
25 TABLET ORAL DAILY
Qty: 90 TABLET | Refills: 0 | OUTPATIENT
Start: 2025-02-12

## 2025-02-13 ENCOUNTER — PATIENT OUTREACH (OUTPATIENT)
Dept: CARE COORDINATION | Facility: CLINIC | Age: 75
End: 2025-02-13
Payer: COMMERCIAL

## 2025-02-18 ENCOUNTER — OFFICE VISIT (OUTPATIENT)
Dept: FAMILY MEDICINE | Facility: CLINIC | Age: 75
End: 2025-02-18
Payer: COMMERCIAL

## 2025-02-18 VITALS
BODY MASS INDEX: 25.91 KG/M2 | RESPIRATION RATE: 15 BRPM | OXYGEN SATURATION: 95 % | HEIGHT: 63 IN | WEIGHT: 146.2 LBS | HEART RATE: 93 BPM | TEMPERATURE: 98.2 F | DIASTOLIC BLOOD PRESSURE: 78 MMHG | SYSTOLIC BLOOD PRESSURE: 108 MMHG

## 2025-02-18 DIAGNOSIS — S61.012A LACERATION OF LEFT THUMB WITHOUT FOREIGN BODY WITHOUT DAMAGE TO NAIL, INITIAL ENCOUNTER: Primary | ICD-10-CM

## 2025-02-18 PROCEDURE — 12001 RPR S/N/AX/GEN/TRNK 2.5CM/<: CPT | Performed by: PHYSICIAN ASSISTANT

## 2025-02-18 ASSESSMENT — PAIN SCALES - GENERAL: PAINLEVEL_OUTOF10: NO PAIN (0)

## 2025-02-18 NOTE — PROGRESS NOTES
"  Assessment & Plan     Laceration of left thumb without foreign body without damage to nail, initial encounter  Patient is up-to-date on tetanus.  Discussed options including ongoing bandaging and monitoring versus Dermabond closure.  Considering location and use of her hands opted to close with Dermabond which was performed without complication.  Discussed wound care and glue care.  Reviewed red flags of infection to prompt reevaluation.          BMI  Estimated body mass index is 26.23 kg/m  as calculated from the following:    Height as of this encounter: 1.59 m (5' 2.6\").    Weight as of this encounter: 66.3 kg (146 lb 3.2 oz).             Celso Lake is a 74 year old, presenting for the following health issues:  Laceration (On left finger)        2/18/2025    10:38 AM   Additional Questions   Roomed by Raman              Concern - Cut  Onset: Last night  Description: on left thumb  Intensity: mild  Progression of Symptoms:  improving    Therapies tried and outcome: None    Last night cut her left thumb on an open can.  Seem to bleed a lot and she applied a bandage with pressure, change in the night and it was still bleeding.  Main concern is location.  Has not been bleeding this morning.  No numbness, tingling.      Objective    /78 (BP Location: Left arm, Patient Position: Sitting, Cuff Size: Adult Regular)   Pulse 93   Temp 98.2  F (36.8  C) (Temporal)   Resp 15   Ht 1.59 m (5' 2.6\")   Wt 66.3 kg (146 lb 3.2 oz)   LMP  (LMP Unknown)   SpO2 95%   BMI 26.23 kg/m    Body mass index is 26.23 kg/m .  Physical Exam   GENERAL: alert and no distress  SKIN: Left thumb with 1 cm jagged laceration to pad.  No active bleeding.  No gaping.  No surrounding erythema.  Extends through the dermis but not deeper.    Procedure: Dermabond  Wound was cleansed with water.  3 layers of Dermabond were applied without complication.  Dressed with bandage.          Signed Electronically by: Erika Ortiz, " JONATHAN

## 2025-03-07 DIAGNOSIS — E03.9 ACQUIRED HYPOTHYROIDISM: ICD-10-CM

## 2025-03-07 RX ORDER — LEVOTHYROXINE SODIUM 50 UG/1
50 TABLET ORAL DAILY
Qty: 90 TABLET | Refills: 0 | Status: CANCELLED | OUTPATIENT
Start: 2025-03-07

## 2025-03-07 NOTE — TELEPHONE ENCOUNTER
Patient never picked up her increased dose of the  levothyroxine (SYNTHROID/LEVOTHROID) she is wondering if she can get a new script sent to the pharmacy so she can pick this up. She is running low on her 25mcg since she is taking 2 a day now. Please give her a call once this has been sent.

## 2025-03-07 NOTE — TELEPHONE ENCOUNTER
RN Triage    Patient Contact    Attempt # 1    Was call answered?  No.  Left message on voicemail with information to call me back.    Upon call back alert patient she has 50mcg RX of synthroid at pharmacy active. She will need to contact the pharmacy and speak directly to a staff member and ask to activate RX from 2/12/25    Tiesha Alcocer RN  [facility] - Registered Nurse  Clinic Triage Jose   March 7, 2025

## 2025-03-10 NOTE — TELEPHONE ENCOUNTER
RN called and patient had picked up prescription already on file for 50 mcg.  Appointments in Next Year      Apr 16, 2025 10:30 AM  LAB with [facility] LAB ONLY  [facility] ([facility] ) [phone]          Patient verbalized understanding and all questions answered.     Tiesha Alcocer RN  [facility] - Registered Nurse  Clinic Triage Jose   March 10, 2025

## 2025-03-13 ENCOUNTER — TELEPHONE (OUTPATIENT)
Dept: FAMILY MEDICINE | Facility: CLINIC | Age: 75
End: 2025-03-13
Payer: COMMERCIAL

## 2025-03-13 NOTE — TELEPHONE ENCOUNTER
Patient Quality Outreach    Patient is due for the following:   Physical Annual Wellness Visit    Action(s) Taken:   Schedule a Annual Wellness Visit    Type of outreach:    Sent [employer] message.    Questions for provider review:    None           Yaritza Whyte MA

## 2025-04-16 ENCOUNTER — LAB (OUTPATIENT)
Dept: LAB | Facility: CLINIC | Age: 75
End: 2025-04-16
Payer: COMMERCIAL

## 2025-04-16 DIAGNOSIS — E03.9 ACQUIRED HYPOTHYROIDISM: ICD-10-CM

## 2025-04-16 LAB — TSH SERPL DL<=0.005 MIU/L-ACNC: 2.9 UIU/ML (ref 0.3–4.2)

## 2025-04-16 PROCEDURE — 84443 ASSAY THYROID STIM HORMONE: CPT

## 2025-04-16 PROCEDURE — 36415 COLL VENOUS BLD VENIPUNCTURE: CPT

## 2025-04-16 NOTE — RESULT ENCOUNTER NOTE
Candice Lake,    If you have not viewed these results on [facility] within 3 days, we will use an alternative method to contact you. We will contact you via the following protocol:    - Via letter if your results are normal.  - Via phone ([phone]) if your results are abnormal.     Here are my comments about your recent results:    TSH - Your thyroid lab results were normal.    Please call the clinic ([phone]), or message us on [employer] with any questions you may have.     Have a great day,    Dr. Cid

## 2025-04-20 ENCOUNTER — HEALTH MAINTENANCE LETTER (OUTPATIENT)
Age: 75
End: 2025-04-20

## 2025-04-21 DIAGNOSIS — E03.9 ACQUIRED HYPOTHYROIDISM: ICD-10-CM

## 2025-04-21 RX ORDER — LEVOTHYROXINE SODIUM 25 UG/1
25 TABLET ORAL DAILY
Qty: 90 TABLET | Refills: 2 | Status: SHIPPED | OUTPATIENT
Start: 2025-04-21

## 2025-05-05 ENCOUNTER — OFFICE VISIT (OUTPATIENT)
Dept: FAMILY MEDICINE | Facility: CLINIC | Age: 75
End: 2025-05-05
Payer: COMMERCIAL

## 2025-05-05 VITALS
TEMPERATURE: 98 F | SYSTOLIC BLOOD PRESSURE: 106 MMHG | HEART RATE: 74 BPM | RESPIRATION RATE: 19 BRPM | OXYGEN SATURATION: 97 % | DIASTOLIC BLOOD PRESSURE: 73 MMHG | WEIGHT: 143.6 LBS | BODY MASS INDEX: 25.76 KG/M2

## 2025-05-05 DIAGNOSIS — M62.89 PELVIC FLOOR DYSFUNCTION: ICD-10-CM

## 2025-05-05 DIAGNOSIS — N93.9 VAGINAL BLEEDING: Primary | ICD-10-CM

## 2025-05-05 DIAGNOSIS — N81.9 VAGINAL VAULT PROLAPSE: ICD-10-CM

## 2025-05-05 PROCEDURE — 1126F AMNT PAIN NOTED NONE PRSNT: CPT | Performed by: NURSE PRACTITIONER

## 2025-05-05 PROCEDURE — 99213 OFFICE O/P EST LOW 20 MIN: CPT | Performed by: NURSE PRACTITIONER

## 2025-05-05 PROCEDURE — 3078F DIAST BP <80 MM HG: CPT | Performed by: NURSE PRACTITIONER

## 2025-05-05 PROCEDURE — 3074F SYST BP LT 130 MM HG: CPT | Performed by: NURSE PRACTITIONER

## 2025-05-05 ASSESSMENT — PAIN SCALES - GENERAL: PAINLEVEL_OUTOF10: NO PAIN (0)

## 2025-05-05 NOTE — PATIENT INSTRUCTIONS
PLAN:   1.   Symptomatic therapy suggested: continue estrogen cream.  2.  Orders Placed This Encounter   Procedures    Ob/Gyn  Referral     3.  CONSULTATION/REFERRAL to GYN    Patient needs to follow up in if no improvement,or sooner if worsening of symptoms or other symptoms develop.

## 2025-05-05 NOTE — PROGRESS NOTES
Answers submitted by the patient for this visit:  General Questionnaire (Submitted on 5/5/2025)  Chief Complaint: Chronic problems general questions HPI Form  How many days per week do you miss taking your medication?: 0  General Concern (Submitted on 5/5/2025)  Chief Complaint: Chronic problems general questions HPI Form  What is the reason for your visit today?: google told me to  When did your symptoms begin?: Today  Questionnaire about: Chronic problems general questions HPI Form (Submitted on 5/5/2025)  Chief Complaint: Chronic problems general questions HPI Form        Subjective   Aleksandra is a 75 year old, presenting for the following health issues:  No chief complaint on file.        5/5/2025    11:20 AM   Additional Questions   Roomed by Danielleia   Accompanied by self     History of Present Illness       Reason for visit:  Google told me to  Symptom onset:  Today   She is taking medications regularly.          Vaginal Symptoms  Onset/Duration: This morning at 1 am and 4 am.  Description:  Vaginal Discharge: red discharge   Itching (Pruritis): No  Burning sensation:  No  Odor: No  Accompanying Signs & Symptoms:  Urinary symptoms: No  Abdominal pain: No  Fever: No  History:   Sexually active: No  New Partner: No  Possibility of Pregnancy:  No  Recent antibiotic use: No  Previous vaginitis issues: YES- bladder infection years ago  Precipitating or alleviating factors: None  Therapies tried and outcome: none  Was 36 yo had hysterectomy for heavy menses   Still has her ovaries   Had some mucus at first from vaginal area and then had bright red blood   Is sexually active    Feels like has more drop   Has seen GYN and has done infrared in the pelvic region   {additonal problems for provider to add (Optional):033338}  Lab work is in process  Labs reviewed in EPIC  BP Readings from Last 3 Encounters:   05/05/25 106/73   02/18/25 108/78   02/06/25 92/60    Wt Readings from Last 3 Encounters:   05/05/25 65.1 kg (143 lb 9.6  oz)   02/18/25 66.3 kg (146 lb 3.2 oz)   02/06/25 65.8 kg (145 lb)                  Patient Active Problem List   Diagnosis    Colon polyp    Seasonal allergic rhinitis    Osteoporosis without current pathological fracture    CARDIOVASCULAR SCREENING; LDL GOAL LESS THAN 160    Varicose veins of both lower extremities    Vaginal vault prolapse    Pelvic floor dysfunction    Left leg weakness    Bilateral hip pain     Past Surgical History:   Procedure Laterality Date    COLONOSCOPY N/A 2/15/2023    Procedure: COLONOSCOPY, FLEXIBLE, WITH LESION REMOVAL USING SNARE;  Surgeon: Yaritza Waite MD;  Location: [facility]    CYSTOSCOPY, BIOPSY BLADDER, COMBINED N/A 08/06/2018    Procedure: COMBINED CYSTOSCOPY, BIOPSY BLADDER;  Cystoscopy with Bladder Biopsy  ;  Surgeon: Pricila Liang MD;  Location: [facility]    ENHANCE LASER REFRACTIVE BILATERAL EXISTING PT IN PARAMETERS Right ~2008    Distance eye    HYSTERECTOMY, VAGINAL  1985    age 36, prolonged menses. ovaries remain    ORTHOPEDIC SURGERY      ULNA PERCUTANEOUS PINNING Right     metal       Social History     Tobacco Use    Smoking status: Former     Types: Cigarettes     Passive exposure: Never    Smokeless tobacco: Never   Substance Use Topics    Alcohol use: Yes     Comment: occasional glass of wine     Family History   Problem Relation Age of Onset    Eye Disorder Mother         macular degeneration    Macular Degeneration Mother     Arthritis Brother          Current Outpatient Medications   Medication Sig Dispense Refill    azelastine (ASTELIN) 0.1 % nasal spray Spray 1 spray into both nostrils 2 times daily 30 mL 1    COMPOUNDED NON-CONTROLLED SUBSTANCE (CMPD RX) - PHARMACY TO MIX COMPOUNDED MEDICATION estrodiol 1mg /gram-use dime size amount to the vagina 2-3 x a week at night 50 g 3    levothyroxine (SYNTHROID/LEVOTHROID) 25 MCG tablet TAKE 1 TABLET(25 MCG) BY MOUTH DAILY 90 tablet 2    levothyroxine (SYNTHROID/LEVOTHROID) 50 MCG tablet Take 1 tablet (50  "mcg) by mouth daily. 90 tablet 0    loratadine (CLARITIN) 10 MG tablet Take 10 mg by mouth daily      Multiple Vitamin (DAILY MULTIVITAMIN PO) Take 1 tablet by mouth daily.      progesterone (PROMETRIUM) 100 MG capsule Take 100 mg by mouth daily      RISEdronate (ACTONEL) 35 MG tablet TAKE 1 TABLET(35 MG) BY MOUTH EVERY 7 DAYS 12 tablet 2    vitamin B complex with vitamin C (STRESS TAB) tablet Take 1 tablet by mouth daily       Allergies   Allergen Reactions    Dust Mites     Mold     Seasonal Allergies            {ROS Picklists (Optional):479138}      Objective    /73 (BP Location: Right arm, Patient Position: Sitting, Cuff Size: Adult Regular)   Pulse 74   Temp 98  F (36.7  C) (Oral)   Resp 19   Wt 65.1 kg (143 lb 9.6 oz)   LMP  (LMP Unknown)   SpO2 97%   BMI 25.76 kg/m    Body mass index is 25.76 kg/m .  Physical Exam   GENERAL: {GENERAL APPEARANCE:338627},{PPD Exam:5021::\"in no apparent distress\"}  EYES: { EXAM CPE - EYES:107843}  HENT:{ EXAM CPE - HENT:460810}   NECK:{PE - NECK:5327::\"normal\",\"supple\",\"no adenopathy\"}  CARDIAC:{HALEIGH EXAM CV:468517}  {CARDIAC NORMAL/ABNORMAL (CP):29404::\"NORMAL - regular rate and rhythm without murmur.\"}  RESP: {LUNG EXAM:982948::\"clear to auscultation and percussion\"}  {EXAM NCC LUN}  ABD:{HALEIGH EXAM GI:162676}  SKIN: {SKIN EXAM:101::\"Skin color, texture, turgor normal. No rashes or lesions.\"}  MS: {HALEIGH EXAM MS/JOINT:651583::\"extremities normal- no gross deformities noted\",\"gait normal\",\"normal muscle tone\"}  NEURO: { EXAM CPE - NEURO:233036}  PSYCH: {EXAM; NEURO MENTAL STATUS:445246},{HALEIGH PATIENT PSYCH APPEARANCE:035038::\"mentation appears normal.\",\"affect and mood normal\"}        {Diagnostic Test Results (Optional):179912}      Assessment & Plan     There are no diagnoses linked to this encounter.          BMI  Estimated body mass index is 25.76 kg/m  as calculated from the following:    Height as of 25: 1.59 m (5' 2.6\").    Weight as of this " encounter: 65.1 kg (143 lb 9.6 oz).   {Weight Management Plan needed for ACO:061263}      {Follow-up (Optional):848900}    Signed Electronically by: VARSHA Perkins CNP  {Email feedback regarding this note to [email or web address]   :066887}

## 2025-05-05 NOTE — PROGRESS NOTES
Faxed referral to [facility] @ [phone]   Thank you,  Latosha JACKSON    [phone]

## 2025-05-05 NOTE — Clinical Note
Please fax referral as requested to  Kae Quinoens MD [facility] [address] [facility] [address] [place] [address] Phone: [phone] Fax: [phone]  Kasandra Mario

## 2025-05-06 ENCOUNTER — PATIENT OUTREACH (OUTPATIENT)
Dept: CARE COORDINATION | Facility: CLINIC | Age: 75
End: 2025-05-06
Payer: COMMERCIAL

## 2025-05-08 ENCOUNTER — PATIENT OUTREACH (OUTPATIENT)
Dept: CARE COORDINATION | Facility: CLINIC | Age: 75
End: 2025-05-08
Payer: COMMERCIAL

## 2025-06-02 DIAGNOSIS — E03.9 ACQUIRED HYPOTHYROIDISM: ICD-10-CM

## 2025-06-02 RX ORDER — LEVOTHYROXINE SODIUM 50 UG/1
50 TABLET ORAL DAILY
Qty: 90 TABLET | Refills: 0 | Status: SHIPPED | OUTPATIENT
Start: 2025-06-02

## 2025-06-12 PROBLEM — R29.898 LEFT LEG WEAKNESS: Status: RESOLVED | Noted: 2024-10-17 | Resolved: 2025-06-12

## 2025-06-12 PROBLEM — M25.551 BILATERAL HIP PAIN: Status: RESOLVED | Noted: 2024-10-17 | Resolved: 2025-06-12

## 2025-06-12 PROBLEM — M25.552 BILATERAL HIP PAIN: Status: RESOLVED | Noted: 2024-10-17 | Resolved: 2025-06-12

## 2025-08-25 ENCOUNTER — TELEPHONE (OUTPATIENT)
Dept: FAMILY MEDICINE | Facility: CLINIC | Age: 75
End: 2025-08-25
Payer: COMMERCIAL

## 2025-08-28 ENCOUNTER — PATIENT OUTREACH (OUTPATIENT)
Dept: CARE COORDINATION | Facility: CLINIC | Age: 75
End: 2025-08-28
Payer: COMMERCIAL

## (undated) DEVICE — DRSG TELFA 3X8" 1238

## (undated) DEVICE — NDL 18GA 1.5" 305196

## (undated) DEVICE — SOL WATER IRRIG 1000ML BOTTLE 2F7114

## (undated) DEVICE — GLOVE PROTEXIS W/NEU-THERA 6.5  2D73TE65

## (undated) DEVICE — SOL WATER IRRIG 3000ML BAG 2B7117

## (undated) DEVICE — PAD CHUX UNDERPAD 30X36" P3036C

## (undated) DEVICE — LINEN TOWEL PACK X5 5464

## (undated) DEVICE — SUCTION MANIFOLD NEPTUNE 2 SYS 4 PORT 0702-020-000

## (undated) DEVICE — SOL NACL 0.9% IRRIG 1000ML BOTTLE 2F7124

## (undated) DEVICE — PACK CYSTO CUSTOM ASC

## (undated) DEVICE — ESU GROUND PAD ADULT W/CORD E7507

## (undated) RX ORDER — FENTANYL CITRATE 50 UG/ML
INJECTION, SOLUTION INTRAMUSCULAR; INTRAVENOUS
Status: DISPENSED
Start: 2023-02-15

## (undated) RX ORDER — ACETAMINOPHEN 325 MG/1
TABLET ORAL
Status: DISPENSED
Start: 2018-08-06

## (undated) RX ORDER — LIDOCAINE HYDROCHLORIDE 20 MG/ML
INJECTION, SOLUTION EPIDURAL; INFILTRATION; INTRACAUDAL; PERINEURAL
Status: DISPENSED
Start: 2018-08-06

## (undated) RX ORDER — PROPOFOL 10 MG/ML
INJECTION, EMULSION INTRAVENOUS
Status: DISPENSED
Start: 2018-08-06